# Patient Record
Sex: MALE | Race: WHITE | NOT HISPANIC OR LATINO | Employment: OTHER | URBAN - METROPOLITAN AREA
[De-identification: names, ages, dates, MRNs, and addresses within clinical notes are randomized per-mention and may not be internally consistent; named-entity substitution may affect disease eponyms.]

---

## 2017-01-03 ENCOUNTER — ALLSCRIPTS OFFICE VISIT (OUTPATIENT)
Dept: OTHER | Facility: OTHER | Age: 72
End: 2017-01-03

## 2017-01-06 ENCOUNTER — ALLSCRIPTS OFFICE VISIT (OUTPATIENT)
Dept: OTHER | Facility: OTHER | Age: 72
End: 2017-01-06

## 2017-01-23 ENCOUNTER — HOSPITAL ENCOUNTER (OUTPATIENT)
Dept: RADIOLOGY | Facility: CLINIC | Age: 72
Discharge: HOME | End: 2017-01-23
Payer: COMMERCIAL

## 2017-01-23 ENCOUNTER — ALLSCRIPTS OFFICE VISIT (OUTPATIENT)
Dept: OTHER | Facility: OTHER | Age: 72
End: 2017-01-23

## 2017-01-23 DIAGNOSIS — J45.909 UNCOMPLICATED ASTHMA: ICD-10-CM

## 2017-01-23 DIAGNOSIS — I10 ESSENTIAL (PRIMARY) HYPERTENSION: ICD-10-CM

## 2017-01-23 DIAGNOSIS — J18.9 PNEUMONIA: ICD-10-CM

## 2017-01-23 PROCEDURE — 71020 HB CHEST X-RAY 2VW FRONTAL&LATL: CPT

## 2017-01-24 ENCOUNTER — GENERIC CONVERSION - ENCOUNTER (OUTPATIENT)
Dept: OTHER | Facility: OTHER | Age: 72
End: 2017-01-24

## 2017-02-28 ENCOUNTER — ALLSCRIPTS OFFICE VISIT (OUTPATIENT)
Dept: OTHER | Facility: OTHER | Age: 72
End: 2017-02-28

## 2017-04-06 ENCOUNTER — ALLSCRIPTS OFFICE VISIT (OUTPATIENT)
Dept: OTHER | Facility: OTHER | Age: 72
End: 2017-04-06

## 2017-04-15 ENCOUNTER — ALLSCRIPTS OFFICE VISIT (OUTPATIENT)
Dept: OTHER | Facility: OTHER | Age: 72
End: 2017-04-15

## 2017-09-01 ENCOUNTER — ALLSCRIPTS OFFICE VISIT (OUTPATIENT)
Dept: OTHER | Facility: OTHER | Age: 72
End: 2017-09-01

## 2017-09-01 LAB
BILIRUB UR QL STRIP: NORMAL
CLARITY UR: NORMAL
COLOR UR: YELLOW
GLUCOSE (HISTORICAL): NORMAL
HGB UR QL STRIP.AUTO: NORMAL
KETONES UR STRIP-MCNC: NORMAL MG/DL
LEUKOCYTE ESTERASE UR QL STRIP: NORMAL
NITRITE UR QL STRIP: NORMAL
OCCULT BLD, FECAL IMMUNOLOGICAL (HISTORICAL): NEGATIVE
PH UR STRIP.AUTO: 6 [PH]
PROT UR STRIP-MCNC: NORMAL MG/DL
SP GR UR STRIP.AUTO: 1.01
UROBILINOGEN UR QL STRIP.AUTO: NORMAL

## 2017-09-02 ENCOUNTER — LAB CONVERSION - ENCOUNTER (OUTPATIENT)
Dept: OTHER | Facility: OTHER | Age: 72
End: 2017-09-02

## 2017-09-02 LAB
ERYTHROCYTE SEDIMENTATION RATE (HISTORICAL): 2 MM/H
TSH SERPL DL<=0.05 MIU/L-ACNC: 1.15 MIU/L (ref 0.4–4.5)

## 2017-09-03 ENCOUNTER — GENERIC CONVERSION - ENCOUNTER (OUTPATIENT)
Dept: OTHER | Facility: OTHER | Age: 72
End: 2017-09-03

## 2017-09-05 ENCOUNTER — LAB CONVERSION - ENCOUNTER (OUTPATIENT)
Dept: OTHER | Facility: OTHER | Age: 72
End: 2017-09-05

## 2017-09-05 LAB
A/G RATIO (HISTORICAL): 2.1 (CALC) (ref 1–2.5)
ALBUMIN SERPL BCP-MCNC: 4.4 G/DL (ref 3.6–5.1)
ALP SERPL-CCNC: 83 U/L (ref 40–115)
ALT SERPL W P-5'-P-CCNC: 13 U/L (ref 9–46)
AST SERPL W P-5'-P-CCNC: 18 U/L (ref 10–35)
BILIRUB SERPL-MCNC: 0.6 MG/DL (ref 0.2–1.2)
BUN SERPL-MCNC: 11 MG/DL (ref 7–25)
BUN/CREA RATIO (HISTORICAL): ABNORMAL (CALC) (ref 6–22)
C-REACTIVE PROTEIN (HISTORICAL): <0.1 MG/DL
CALCIUM SERPL-MCNC: 9.7 MG/DL (ref 8.6–10.3)
CHLORIDE SERPL-SCNC: 99 MMOL/L (ref 98–110)
CHOLEST SERPL-MCNC: 188 MG/DL
CHOLEST/HDLC SERPL: 3.6 (CALC)
CO2 SERPL-SCNC: 32 MMOL/L (ref 20–31)
CREAT SERPL-MCNC: 0.87 MG/DL (ref 0.7–1.18)
EGFR AFRICAN AMERICAN (HISTORICAL): 101 ML/MIN/1.73M2
EGFR-AMERICAN CALC (HISTORICAL): 87 ML/MIN/1.73M2
ERYTHROCYTE SEDIMENTATION RATE (HISTORICAL): 2 MM/H
GAMMA GLOBULIN (HISTORICAL): 2.1 G/DL (CALC) (ref 1.9–3.7)
GLUCOSE (HISTORICAL): 85 MG/DL (ref 65–99)
HDLC SERPL-MCNC: 52 MG/DL
LDL CHOLESTEROL (HISTORICAL): 114 MG/DL (CALC)
NON-HDL-CHOL (CHOL-HDL) (HISTORICAL): 136 MG/DL (CALC)
POTASSIUM SERPL-SCNC: 5.1 MMOL/L (ref 3.5–5.3)
PROSTATE SPECIFIC ANTIGEN TOTAL (HISTORICAL): 0.8 NG/ML
SODIUM SERPL-SCNC: 138 MMOL/L (ref 135–146)
TOTAL PROTEIN (HISTORICAL): 6.5 G/DL (ref 6.1–8.1)
TRIGL SERPL-MCNC: 110 MG/DL
TSH SERPL DL<=0.05 MIU/L-ACNC: 1.15 MIU/L (ref 0.4–4.5)
URIC ACID (HISTORICAL): 7.1 MG/DL (ref 4–8)

## 2017-09-06 ENCOUNTER — GENERIC CONVERSION - ENCOUNTER (OUTPATIENT)
Dept: OTHER | Facility: OTHER | Age: 72
End: 2017-09-06

## 2018-01-11 NOTE — RESULT NOTES
Message   PLEASE CALL   REPEAT POTASSIUM WAS 4 7  - IN THE NORMAL RANGE   PSA WAS 1 4 WHICH WAS NORMAL AND LOW   THANKS     Verified Results  (1) BASIC METABOLIC PROFILE 30BSJ5383 09:54AM Nilesstefani Padron    Order Number: [id number]     Test Name Result Flag Reference   GLUCOSE,RANDM 90 mg/dL     If the patient is fasting, the ADA then defines impaired fasting glucose as > 100 mg/dL and diabetes as > or equal to 123 mg/dL  SODIUM 143 mmol/L  136-145   POTASSIUM 4 7 mmol/L  3 5-5 3   CHLORIDE 105 mmol/L  100-108   CARBON DIOXIDE 34 mmol/L H 21-32   ANION GAP (CALC) 4 mmol/L  4-13   BLOOD UREA NITROGEN 7 mg/dL  5-25   CREATININE 0 89 mg/dL  0 60-1 30   Standardized to IDMS reference method   CALCIUM 8 8 mg/dL  8 3-10 1   eGFR Non-African American      >60 0 ml/min/1 73sq m   [facility] Disease Education Program recommendations are as follows:  GFR calculation is accurate only with a steady state creatinine  Chronic Kidney disease less than 60 ml/min/1 73 sq  meters  Kidney failure less than 15 ml/min/1 73 sq  meters

## 2018-01-11 NOTE — PROGRESS NOTES
Assessment   1  Hypertension (401 9) (I10)  2  Mild intermittent asthma without complication (169 78) (T00 08)  3  Hyperlipidemia (272 4) (E78 5)  4  Hyperplasia of prostate (600 90) (N40 0)  5  GERD (gastroesophageal reflux disease) (530 81) (K21 9)  6  Generalized osteoarthritis of multiple sites (715 09) (M15 9)  7  History of chronic rhinitis (V12 69) (Z87 09)  8  Gouty arthropathy (274 00) (M10 9)    Plan   Generalized osteoarthritis of multiple sites, GERD (gastroesophageal reflux disease),  Gouty arthropathy, Hyperlipidemia, Hyperplasia of prostate, Hypertension, Mild  intermittent asthma without complication    · (1) CBC/ PLT (NO DIFF); Status: In Progress - Specimen/Data Collected;   Done:  39GHP8906   · (1) SED RATE; Status: In Progress - Specimen/Data Collected;   Done: 24MNL8047   · (1) TSH; Status: In Progress - Specimen/Data Collected;   Done: 09TYU1033   · EKG/ECG- POC; Status:Complete;   Done: 19OFP9718   · Hemoccult Screening - POC; Status:Complete;   Done: 71RFU9180 12:00AM   · Routine Venipuncture - POC; Status:Complete;   Done: 56YLF8433  Health Maintenance    · Always use a seat belt and shoulder strap when riding or driving a motor vehicle ;  Status:Complete;   Done: 49PFY0554   · Begin a limited exercise program ; Status:Complete;   Done: 95STU5648   · Drink plenty of fluids ; Status:Complete;   Done: 82ENH1451   · Eat a low fat and low cholesterol diet ; Status:Complete;   Done: 84WSM3992   · Eat a normal well-balanced diet ; Status:Complete;   Done: 67PLD6451   · Use a sun block product with an SPF of 15 or more ; Status:Complete;   Done:  16ZMY2891   · We encourage all of our patients to exercise regularly    30 minutes of exercise or physical  activity five or more days a week is recommended for children and adults ;  Status:Complete;   Done: 38EZZ5440   · We recommend routine visits to a dentist ; Status:Complete;   Done: 41VZH2627  Hypertension    · A diet low in sodium and high in potassium, magnesium, and calcium can help your  blood pressure ; Status:Complete;   Done: 59LOC8013   · Continue with our present treatment plan ; Status:Complete;   Done: 88CFX1994   · Restrict the salt in your diet by avoiding highly salted foods ; Status:Complete;   Done:  88NKR0245   · Call [phone] if: You become dizzy or lightheaded, especially when you stand up  after sitting for a while ; Status:Complete;   Done: 67AXS5565    Urine Dip Automated- POC; Status:Resulted - Requires Verification,Retrospective Authorization;   Done: 17DXU8908 12:00AM  Due:01Sep2018; Last Updated By:Johanne Peralta Aw; 9/3/2017 3:50:30 PM;Ordered;    For:Health Maintenance; Ordered By:Jonn Cook;   (1) COMPREHENSIVE METABOLIC PANEL; Status:Resulted - Requires Verification;   Done: 85JPP5569 12:00AM  Due:01Sep2018; Ordered; For:Generalized osteoarthritis of multiple sites, GERD (gastroesophageal reflux disease), Gouty arthropathy, Hyperlipidemia, Hyperplasia of prostate, Hypertension, Mild intermittent asthma without complication; Ordered By:Jonn Cook;   (1) C-REACTIVE PROTEIN; Status:Resulted - Requires Verification;   Done: 86TWG4571 12:00AM  Due:59Pnn5310; Ordered; For:Generalized osteoarthritis of multiple sites, GERD (gastroesophageal reflux disease), Gouty arthropathy, Hyperlipidemia, Hyperplasia of prostate, Hypertension, Mild intermittent asthma without complication; Ordered By:Jonn Cook;   (1) LIPID PANEL, FASTING; Status:Resulted - Requires Verification;   Done: 74RVE6916 12:00AM  Due:57Xbt3566; Ordered; For:Generalized osteoarthritis of multiple sites, GERD (gastroesophageal reflux disease), Gouty arthropathy, Hyperlipidemia, Hyperplasia of prostate, Hypertension, Mild intermittent asthma without complication; Ordered By:Jonn Cook;   (1) URIC ACID; Status:Resulted - Requires Verification;   Done: 03VOL9208 12:00AM  Due:23Nnz8712; Ordered;     For:Generalized osteoarthritis of multiple sites, GERD (gastroesophageal reflux disease), Gouty arthropathy, Hyperlipidemia, Hyperplasia of prostate, Hypertension, Mild intermittent asthma without complication; Ordered By:Ilir Cook;   (1) PSA (SCREEN) (Dx V76 44 Screen for Prostate Cancer); Status:Resulted - Requires Verification;   Done: 53MMO7019 12:00AM  Due:01Sep2018; Ordered;    For:Encounter for prostate cancer screening, Generalized osteoarthritis of multiple sites, GERD (gastroesophageal reflux disease), Gouty arthropathy, Hyperlipidemia, Hyperplasia of prostate, Hypertension, Mild intermittent asthma without complication; Ordered By:Ilir Cook; Follow-up visit in 1 year Evaluation and Treatment  Follow-up  Status: Hold For - Scheduling  Requested for: 01Sep2017  Ordered; For: Health Maintenance;  Ordered By: Catalina Tobin  Performed:   Due: 01SRB5872  Follow-up visit in 6 months Evaluation and Treatment  Follow-up  Status: Hold For - Scheduling  Requested for: 01Sep2017  Ordered; For: Hypertension;  Ordered By: Catalina Fails  Performed:   Due: 64HAR3288  Fluzone High-Dose 0 5 ML Intramuscular Suspension Prefilled Syringe; INJECT 0 5  ML Intramuscular; Dose: 0 5 ml; Route: Intramuscular; Site: Left Deltoid; Done: 41XZW6105 11:31AM; Status: Complete - Retrospective By Protocol Authorization;  Ordered  For: Need for influenza vaccination; Ordered By:Ilir Cook; Effective Date:01Sep2017; Administered by: Adonay Kauffman: 9/1/2017 11:31:00 AM; Last Updated By: Adonay Kauffman; 9/3/2017 3:50:35 PM     Discussion/Summary    DISCUSSED HEALTH MAINTENANCE ISSUES  BW WILL BE OBTAINED  BP CHECK IN 6 M  RV 1 YR FOR CPX  Impression: Initial Annual Wellness Visit, with preventive exam as well as age and risk appropriate counseling completed   Immunizations: influenza vaccine is up to date this year, the lifetime pneumococcal vaccine has been completed, hepatitis B vaccination series is not indicated at this time due to the patient's low risk of donald the disease, Zostavax vaccination status is unknown and Tdap vaccination up to date  Advance Directive Planning: not complete  Patient Discussion: follow-up visit needed in 6 MONTHS  The treatment plan was reviewed with the patient/guardian  The patient/guardian understands and agrees with the treatment plan      Chief Complaint  AWV (dli)      History of Present Illness  HPI: PATIENT RETURNS FOR ANNUAL REVIEW OF MEDICAL ISSUES  REVIEWED MEDICAL RECORD  NO MAJOR CONCERNS   Welcome to Medicare and Wellness Visits: The patient is being seen for the initial annual wellness visit  Medicare Screening and Risk Factors   Hospitalizations: he has been previously hospitalizied  Medicare Screening Tests Risk Questions   Abdominal aortic aneurysm risk assessment: none indicated  Osteoporosis risk assessment: none indicated  HIV risk assessment: none indicated  Drug and Alcohol Use: The patient is a former cigarette smoker  The patient reports rare alcohol use  Alcohol concern:   The patient has no concerns about alcohol abuse  He has never used illicit drugs  Diet and Physical Activity: Current diet includes well balanced meals  He exercises infrequently  Mood Disorder and Cognitive Impairment Screening: PHQ-9 Depression Scale   Depression screening  mild to moderate symptoms  He denies feeling down, depressed, or hopeless over the past two weeks  He denies feeling little interest or pleasure in doing things over the past two weeks  Cognitive impairment screening: denies difficulty learning/retaining new information, denies difficulty handling complex tasks, denies difficulty with reasoning, denies difficulty with spatial ability and orientation, denies difficulty with language and denies difficulty with behavior  Advance Directives: Advance directives: no living will, no durable power of  for health care directives and no advance directives     Co-Managers and Medical Equipment/Suppliers: See Patient Care Team   Preventive Quality Program 65 and Older: The patient currently has no urinary incontinence symptoms  Review of Systems    Constitutional: no fever, no chills and not feeling tired  ENT: no sore throat and no nasal discharge  Cardiovascular: no chest pain, the heart rate was not fast, no palpitations and no lower extremity edema  Respiratory: no shortness of breath, no wheezing and no shortness of breath during exertion  Gastrointestinal: no abdominal pain, no nausea, no vomiting and no diarrhea  Genitourinary: no dysuria  Musculoskeletal: arthralgias and joint stiffness, but no joint swelling  Integumentary: no rashes  Neurological: no headache, no numbness, no tingling and no dizziness  ROS reviewed  Active Problems   1  Adjustment disorder with mixed anxiety and depressed mood (309 28) (F43 23)  2  Disturbance of sleep (780 50) (G47 9)  3  Generalized osteoarthritis of multiple sites (715 09) (M15 9)  4  GERD (gastroesophageal reflux disease) (530 81) (K21 9)  5  Gouty arthropathy (274 00) (M10 9)  6  Hyperlipidemia (272 4) (E78 5)  7  Hyperplasia of prostate (600 90) (N40 0)  8  Hypertension (401 9) (I10)  9  Mild intermittent asthma without complication (322 37) (X65 16)  10  Sensorineural hearing loss (389 10) (H90 5)    Past Medical History    · History of Colon cancer screening (V76 51) (Z12 11)   · History of Glaucoma (365 9) (H40 9)   · History of cataract (V12 49) (Z86 69)   · History of Sciatica of right side (724 3) (M54 31)    The active problems and past medical history were reviewed and updated today  Surgical History    · History of Tonsillectomy    The surgical history was reviewed and updated today         Family History  Mother    · Family history of CAD (coronary artery disease)   · Family history of Cancer  Father    · Family history of CAD (coronary artery disease)   · Family history of   Family History · Denied: FH: mental illness    The family history was reviewed and updated today  Social History    · Denied: History of Active advance directive   · Cultural background   · NON-   · Daily caffeine consumption, 1 serving a day   · Dental care, regularly   · Drinks wine (V49 89) (Z78 9)   · Former smoker (V15 82) (Z64 147)   · [facility]   · No advance directives (V49 89) (Z78 9)   · No alcohol use   · Primary spoken language English  The social history was reviewed and updated today  The social history was reviewed and updated today  Current Meds  1  Asmanex 30 Metered Doses 110 MCG/INH Inhalation Aerosol Powder Breath Activated;   USE 1 INHALATION DAILY (DISCARD 45 DAYS AFTER OPENING); Therapy: 30KCF8823 to (Last YR:10KOY4130)  Requested for: 24Jan2017 Ordered  2  Clarinex-D 12 Hour 2 5-120 MG Oral Tablet Extended Release 12 Hour; TAKE 1 TABLET   EVERY 12 HOURS AS NEEDED; Therapy: 82Rrw3810 to (Evaluate:23Mar2017)  Requested for: 53Gaa2831; Last   Rx:22Feb2017 Ordered  3  Finasteride 5 MG Oral Tablet; TAKE 1 TABLET DAILY; Therapy: 37RSA9727 to (HIRFIAWQ:52FRT4472); Last Rx:30Jan2017 Ordered  4  Myrbetriq 50 MG Oral Tablet Extended Release 24 Hour; TAKE 1 TABLET Bedtime; Last   Rx:30Jan2017 Ordered  5  Nabumetone 500 MG Oral Tablet; TAKE 1 TABLET TWICE DAILY; Last Rx:24Jan2017   Ordered  6  Tamsulosin HCl - 0 4 MG Oral Capsule; Take 1 tablet by mouth at bedtime; Last   Rx:30Jan2017 Ordered  7  Zolpidem Tartrate 10 MG Oral Tablet; take 1 tablet by mouth at bedtime if needed; Therapy: 64PIK0746 to (Evaluate:05Url8630)  Requested for: 38JEV6831; Last   Rx:21Jun2017 Ordered    The medication list was reviewed and updated today  Allergies   1  No Known Drug Allergies    Immunizations   ** Printed in Appendix #1 below       Vitals  Signs    Temperature: 97 5 F  Heart Rate: 58  Respiration: 16  Systolic: 335  Diastolic: 78  Height: 6 ft 1 in  Weight: 194 lb   BMI Calculated: 25 6  BSA Calculated: 2 12    Physical Exam    Constitutional   General appearance: No acute distress, well appearing and well nourished  Eyes   Conjunctiva and lids: No swelling, erythema, or discharge  Pupils and irises: Equal, round and reactive to light  Ears, Nose, Mouth, and Throat   External inspection of ears and nose: Normal     Otoscopic examination: Tympanic membrance translucent with normal light reflex  Canals patent without erythema  Nasal mucosa, septum, and turbinates: Normal without edema or erythema  Oropharynx: Normal with no erythema, edema, exudate or lesions  Pulmonary   Respiratory effort: No increased work of breathing or signs of respiratory distress  Auscultation of lungs: Clear to auscultation, equal breath sounds bilaterally, no wheezes, no rales, no rhonci  Cardiovascular   Auscultation of heart: Normal rate and rhythm, normal S1 and S2, without murmurs  Examination of extremities for edema and/or varicosities: Normal     Carotid pulses: Normal     Abdomen   Abdomen: Non-tender, no masses  Liver and spleen: No hepatomegaly or splenomegaly  Lymphatic   Palpation of lymph nodes in neck: No lymphadenopathy  Musculoskeletal   Gait and station: Normal     Digits and nails: Normal without clubbing or cyanosis  Inspection/palpation of joints, bones, and muscles: Abnormal   MILD DJD CHANGES  Skin   Skin and subcutaneous tissue: Normal without rashes or lesions  Neurologic   Cranial nerves: Cranial nerves 2-12 intact  Reflexes: 2+ and symmetric  Sensation: No sensory loss      Psychiatric   Orientation to person, place and time: Normal     Mood and affect: Normal          Results/Data  *VB - Urinary Incontinence Screen (Dx Z13 89 Screen for UI) 54Kqx7473 10:40AM      Test Name Result Flag Reference   Urinary Incontinence Assessment 04Ynr4829       Falls Risk Assessment (Dx Z13 89 Screen for Neurologic Disorder) 15BCK8080 10:39AM User, Ahs     Test Name Result Flag Reference   Falls Risk      No falls in the past year     Hemoccult Screening - POC 48Aym5028 12:00AM David Ulloa     Test Name Result Flag Reference   Hemoccult Negative         Signatures   Electronically signed by : Giuliana Handy MD; Oct  1 2017  4:16PM EST                       (Author)    Appendix #1     Patient: Rosemarie Hanna; : 1945; MRN: [id number]      1 2 3 4 5    Influenza  13-Oct-2010 12-Sep-2013 01-Oct-2014 25-Aug-2016 [id number]    [facility]  25-Aug-2016        Td/DT  01-Edward-1999 20-May-2010

## 2018-01-12 VITALS
HEART RATE: 68 BPM | RESPIRATION RATE: 16 BRPM | DIASTOLIC BLOOD PRESSURE: 66 MMHG | SYSTOLIC BLOOD PRESSURE: 130 MMHG | WEIGHT: 201 LBS | TEMPERATURE: 96.5 F

## 2018-01-12 VITALS
WEIGHT: 196 LBS | RESPIRATION RATE: 16 BRPM | DIASTOLIC BLOOD PRESSURE: 80 MMHG | BODY MASS INDEX: 26.55 KG/M2 | HEIGHT: 72 IN | TEMPERATURE: 96.7 F | SYSTOLIC BLOOD PRESSURE: 142 MMHG | HEART RATE: 80 BPM

## 2018-01-13 VITALS
TEMPERATURE: 97.5 F | DIASTOLIC BLOOD PRESSURE: 78 MMHG | WEIGHT: 194 LBS | HEART RATE: 58 BPM | SYSTOLIC BLOOD PRESSURE: 130 MMHG | HEIGHT: 73 IN | BODY MASS INDEX: 25.71 KG/M2 | RESPIRATION RATE: 16 BRPM

## 2018-01-13 VITALS
HEART RATE: 64 BPM | DIASTOLIC BLOOD PRESSURE: 70 MMHG | BODY MASS INDEX: 25.67 KG/M2 | RESPIRATION RATE: 20 BRPM | SYSTOLIC BLOOD PRESSURE: 110 MMHG | HEIGHT: 74 IN | WEIGHT: 200 LBS | OXYGEN SATURATION: 98 % | TEMPERATURE: 97.7 F

## 2018-01-13 VITALS
DIASTOLIC BLOOD PRESSURE: 74 MMHG | HEIGHT: 72 IN | RESPIRATION RATE: 16 BRPM | HEART RATE: 68 BPM | SYSTOLIC BLOOD PRESSURE: 130 MMHG | TEMPERATURE: 97.3 F | BODY MASS INDEX: 27.63 KG/M2 | WEIGHT: 204 LBS

## 2018-01-13 NOTE — RESULT NOTES
Message   please call   cxr was great   no signs of pneumonia     Verified Results  * XR CHEST PA & LATERAL 58WFT4287 03:15PM Gerhardt Rolling     Test Name Result Flag Reference   XR CHEST PA & LATERAL (Report)     CHEST      INDICATION: Follow-up pneumonia  COMPARISON: None     VIEWS: Frontal and lateral projections; 2 images     FINDINGS:        Cardiomediastinal silhouette appears unremarkable  The lungs are clear  No pneumothorax or pleural effusion  Visualized osseous structures appear within normal limits for the patient's age  IMPRESSION:     No active pulmonary disease  Workstation performed: [id number]     Signed by:   Roxanne Herrmann MD   1/23/17       Plan  Arthritis    · Nabumetone 500 MG Oral Tablet; TAKE 1 TABLET TWICE DAILY  Asthma    · Asmanex 30 Metered Doses 110 MCG/INH Inhalation Aerosol Powder Breath  Activated; USE 1 INHALATION DAILY (DISCARD 45 DAYS AFTER OPENING)  Disturbance of sleep    · Zolpidem Tartrate 10 MG Oral Tablet; take 1 tablet by mouth at bedtime if  needed  PMH: History of allergic rhinitis    · Desloratadine 5 MG Oral Tablet;  Take 1 tablet daily   · Mometasone Furoate 50 MCG/ACT Nasal Suspension (Nasonex); USE 1 SPRAY  IN EACH NOSTRIL DAILY

## 2018-01-13 NOTE — RESULT NOTES
Discussion/Summary   JOHANNE,   REVIEWED BW RESULTS   ALL LOOKED GOOD! KEEP UP THE GOOD WORK      DR CANTU     Verified Results  (1) COMPREHENSIVE METABOLIC PANEL 44PWT6331 93:53OO [employer]     Test Name Result Flag Reference   GLUCOSE 85 mg/dL  65-99   Fasting reference interval   UREA NITROGEN (BUN) 11 mg/dL  7-25   CREATININE 0 87 mg/dL  0 70-1 18   For patients >52years of age, the reference limit  for Creatinine is approximately 13% higher for people  identified as -American  eGFR NON-AFR  AMERICAN 87 mL/min/1 73m2  > OR = 60   eGFR AFRICAN AMERICAN 101 mL/min/1 73m2  > OR = 60   BUN/CREATININE RATIO   3-50   NOT APPLICABLE (calc)   SODIUM 138 mmol/L  135-146   POTASSIUM 5 1 mmol/L  3 5-5 3   CHLORIDE 99 mmol/L     CARBON DIOXIDE 32 mmol/L H 20-31   CALCIUM 9 7 mg/dL  8 6-10 3   PROTEIN, TOTAL 6 5 g/dL  6 1-8 1   ALBUMIN 4 4 g/dL  3 6-5 1   GLOBULIN 2 1 g/dL (calc)  1 9-3 7   ALBUMIN/GLOBULIN RATIO 2 1 (calc)  1 0-2 5   BILIRUBIN, TOTAL 0 6 mg/dL  0 2-1 2   ALKALINE PHOSPHATASE 83 U/L     AST 18 U/L  10-35   ALT 13 U/L  9-46     (1) C-REACTIVE PROTEIN 01Sep2017 12:46PM [employer]     Test Name Result Flag Reference   C-REACTIVE PROTEIN <0 10 mg/dL  <0 80   Please be advised that patients taking Carboxypenicillins  may exhibit falsely decreased C-Reactive Protein levels  due to an analytical interference in this assay  (1) LIPID PANEL, FASTING 01Sep2017 12:46PM [employer]     Test Name Result Flag Reference   CHOLESTEROL, TOTAL 188 mg/dL  <200   HDL CHOLESTEROL 52 mg/dL  >58   TRIGLICERIDES 959 mg/dL  <150   LDL-CHOLESTEROL 114 mg/dL (calc) H    Reference range: <100     Desirable range <100 mg/dL for patients with CHD or  diabetes and <70 mg/dL for diabetic patients with  known heart disease       The [facility] calculation is a validated novel   method that provides better accuracy than the   Friedewald equation in the estimation of LDL-C,   particularly when TG levels are 150-400 mg/dL and   LDL-C levels are lower than 70 mg/dL  Reference:  Melissa Parikh et al  Comparison of a Novel   Method vs the Friedewald Equation for Estimating   Low-Density Lipoprotein Cholesterol Levels From the   Standard Lipid Profile  LOS  5829;108(08): 9410-9560  For additional information, please refer to  [email or web address]/faq/ITS117  (This link is being provided for informational/  educational purposes only )   CHOL/HDLC RATIO 3 6 (calc)  <5 0   NON HDL CHOLESTEROL 136 mg/dL (calc) H <130   For patients with diabetes plus 1 major ASCVD risk   factor, treating to a non-HDL-C goal of <100 mg/dL   (LDL-C of <70 mg/dL) is considered a therapeutic   option      (1) URIC ACID 61Szw1179 12:46PM [employer]     Test Name Result Flag Reference   URIC ACID 7 1 mg/dL  4 0-8 0   Therapeutic target for gout patients: <6 0 mg/dL     (1) PSA (SCREEN) (Dx V76 44 Screen for Prostate Cancer) 99Mmu2470 12:46PM [employer]     Test Name Result Flag Reference   PSA, TOTAL 0 8 ng/mL  < OR = 4 0   The total PSA value from this assay system is   standardized against the WHO standard  The test   result will be approximately 20% lower when compared   to the equimolar-standardized total PSA (Prakash   Julio)  Comparison of serial PSA results should be   interpreted with this fact in mind  This test was performed using the Siemens   chemiluminescent method  Values obtained from   different assay methods cannot be used  interchangeably  PSA levels, regardless of  value, should not be interpreted as absolute  evidence of the presence or absence of disease       (Q) SED RATE BY MODIFIED Lane Hoskins 58KNE7321 12:46PM [employer]     Test Name Result Flag Reference   SED RATE BY MODIFIEDHAY 2 mm/h  < OR = 20     (Q) CBC (H/H, RBC, INDICES, WBC, PLT) 96BVV6398 12:46PM [employer]     Test Name Result Flag Reference   WHITE BLOOD CELL COUNT 5 1 Thousand/uL  3 8-10 8   RED BLOOD CELL COUNT 4 78 Million/uL  4 20-5 80   HEMOGLOBIN 14 2 g/dL  13 2-17 1   HEMATOCRIT 43 6 %  38 5-50 0   MCV 91 2 fL  80 0-100 0   MCH 29 7 pg  27 0-33 0   MCHC 32 6 g/dL  32 0-36 0   RDW 11 9 %  11 0-15 0   PLATELET COUNT 049 Thousand/uL  140-400   WE RECEIVED YOUR HANDWRITTEN TEST ORDER AND  PERFORMED A HEMOGRAM WITH A PLATELET WITHOUT  A DIFFERENTIAL  IF THIS IS NOT WHAT YOU INTENDED  TO ORDER, PLEASE CONTACT YOUR LOCAL CLIENT SERVICE  REPRESENTATIVE IMMEDIATELY SO THAT WE CAN   ADJUST OUR BILLING APPROPRIATELY  YOU MAY ALSO   INQUIRE ABOUT ALTERNATIVE OR ADDITIONAL TESTING     MPV 10 2 fL  7 5-12 5           (Q) TSH, 3RD GENERATION 29Mqf0883 12:46PM Sreedhar Rocha     Test Name Result Flag Reference   TSH 1 15 mIU/L  0 40-4 50

## 2018-01-14 NOTE — PROGRESS NOTES
Assessment    1  Encounter for preventive health examination (V70 0) (Z00 00)    Plan  Disturbance of sleep, Hyperlipidemia    · (1) TSH; Status:Active; Requested for:02May2016;   Encounter for prostate cancer screening    · (1) PSA (SCREEN) (Dx V76 44 Screen for Prostate Cancer); Status:Active; Requested  for:02May2016;   Encounter for prostate cancer screening, Hyperlipidemia, Hypertension    · (1) COMPREHENSIVE METABOLIC PANEL; Status:Active; Requested for:02May2016;   GERD (gastroesophageal reflux disease), Gout, Hyperlipidemia, Hypertension    · (1) CBC/PLT/DIFF; Status:Active; Requested for:02May2016; Health Maintenance    · Follow-up visit in 1 year Evaluation and Treatment  Follow-up  Status: Hold For -  Scheduling  Requested for: 54PLW5200   · Always use a seat belt and shoulder strap when riding or driving a motor vehicle ;  Status:Complete;   Done: 00ETE9107 11:00AM   · Begin a limited exercise program ; Status:Complete;   Done: 34DBL0686 11:00AM   · We recommend that you follow the "Mediterranean diet "; Status:Complete;   Done:  87VXR2769 11:00AM   · Physical Therapy Referral Other Physician Referral  Consult  Status: Active  Requested  for: 86IRE5548  are Referring to a non- Preferred Provider : Established Patient  Care Summary provided  : Yes  Hyperlipidemia, Hypertension    · (1) LIPID PANEL, FASTING; Status:Active; Requested for:02May2016;     Discussion/Summary  Impression: Subsequent Annual Wellness Visit  Cardiovascular screening and counseling: screening is current, counseling was given on maintaining a healthy diet, counseling was given on maintaining a healthy weight and due for a lipid panel  Diabetes screening and counseling: due for blood glucose  Colorectal cancer screening and counseling: screening is current  Prostate cancer screening and counseling: screening is current  Advance Directive Planning: not complete        Chief Complaint  Patient is here today for an Annual Wellness Exam  lb/pn      History of Present Illness  Welcome to Medicare and Wellness Visits: The patient is being seen for the subsequent annual wellness visit  Medicare Screening and Risk Factors   Hospitalizations: he has been hospitalized 0 times and None  Medicare Screening Tests Risk Questions   Abdominal aortic aneurysm risk assessment: over 72years of age and Quit 39 years ago  Osteoporosis risk assessment:  and over 48years of age  HIV risk assessment: none indicated  Drug and Alcohol Use: The patient is a former cigarette smoker and quit smoking 36 years ago  He has smoked for year(s)  The patient reports never drinking alcohol  Alcohol concern:   The patient has no concerns about alcohol abuse  He has never used illicit drugs  Diet and Physical Activity: Current diet includes well balanced meals, low salt food choices, 2 servings of fruit per day, 1 servings of vegetables per day, 1 servings of meat per day, 1 servings of whole grains per day, 2 cups of coffee per day and 6 glasses of water per day  He exercises infrequently and exercises 5 times per week  Exercise: walking 25 minutes per day  Mood Disorder and Cognitive Impairment Screening: PHQ-9 Depression Scale Anxiety screening score was 0  He denies feeling down, depressed, or hopeless over the past two weeks  He denies feeling little interest or pleasure in doing things over the past two weeks  Cognitive impairment screening: denies difficulty learning/retaining new information, denies difficulty handling complex tasks, denies difficulty with reasoning, denies difficulty with spatial ability and orientation, denies difficulty with language and denies difficulty with behavior  Functional Ability/Level of Safety: Hearing is slightly decreased in the right ear, slightly decreased in the left ear and a hearing aid is not used  He reports hearing difficulties   Activities of daily living details: does not need help using the phone, no transportation help needed, does not need help shopping, no meal preparation help needed, does not need help doing housework, does not need help doing laundry, does not need help managing medications and does not need help managing money  Fall risk factors:  Incomplete emptying of bladder and urinary frequency  , but The patient fell None times in the past 12 months  Home safety risk factors:  no unfamiliar surroundings, no loose rugs, no poor household lighting, no uneven floors, no household clutter, grab bars in the bathroom and handrails on the stairs  Advance Directives: Advance directives: no living will, no durable power of  for health care directives and no advance directives  Co-Managers and Medical Equipment/Suppliers: See Patient Care Team      Review of Systems    Constitutional: negative  Eyes: negative  ENT: negative  Cardiovascular: negative  Respiratory: negative  Gastrointestinal: negative  Genitourinary: negative  Musculoskeletal: negative  Integumentary and Breasts: negative  Neurological: Sciatic pain, but negative  Psychiatric: negative  Endocrine: negative  Hematologic and Lymphatic: negative  Active Problems    1  Acute bronchitis (466 0) (J20 9)   2  Adjustment disorder with mixed anxiety and depressed mood (309 28) (F43 23)   3  Allergic rhinitis (477 9) (J30 9)   4  Arthralgia (719 40) (M25 50)   5  Asthma (493 90) (J45 909)   6  Cataract (366 9) (H26 9)   7  Chronic rhinitis (472 0) (J31 0)   8  Disturbance of sleep (780 50) (G47 9)   9  Encounter for prostate cancer screening (V76 44) (Z12 5)   10  GERD (gastroesophageal reflux disease) (530 81) (K21 9)   11  Gout (274 9) (M10 9)   12  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)   13  Hyperlipidemia (272 4) (E78 5)   14  Hyperplasia of prostate (600 90) (N40 0)   15  Hypertension (401 9) (I10)   16  Osteoarthritis, multiple sites (715 89) (M15 9)   17  Otalgia (388 70) (H92 09)   18  Overweight (278 02) (E66 3)   19  Rash and other nonspecific skin eruption (782 1) (R21)   20  Sensorineural hearing loss (389 10) (H90 5)    Past Medical History    · History of Acute otitis media (382 9) (H66 90)   · History of Cellulitis and abscess of digit (681 9)   · History of Colon cancer screening (V76 51) (Z12 11)   · History of Eustachian tube dysfunction (381 81) (H69 80)   · History of Glaucoma (365 9) (H40 9)   · History of Pneumonia (486) (J18 9)   · History of Sinusitis (473 9) (J32 9)    The active problems and past medical history were reviewed and updated today  Surgical History    · History of Tonsillectomy    The surgical history was reviewed and updated today  Family History  Mother    · Family history of CAD (coronary artery disease)   · Family history of Cancer  Father    · Family history of CAD (coronary artery disease)   · Family history of     The family history was reviewed and updated today  Social History    · Cultural background   · NON-   · Daily caffeine consumption, 1 serving a day   · Drinks wine (V49 89) (Z78 9)   · Former smoker (Z61 32) (Y46 968)   · QUIT IN    · No alcohol use   · Primary spoken language English  The social history was reviewed and updated today  Current Meds   1  Asmanex 120 Metered Doses 220 MCG/INH Inhalation Aerosol Powder Breath Activated;   INHALE 1 PUFF TWICE DAILY; Therapy: (Recorded:2015) to Recorded   2  Asmanex 30 Metered Doses 110 MCG/INH Inhalation Aerosol Powder Breath Activated;   USE 1 INHALATION DAILY (DISCARD 45 DAYS AFTER OPENING); Therapy: 87WXW9435 to (Last Rx:2015)  Requested for: 2015 Ordered   3  Desloratadine 5 MG Oral Tablet; Take 1 tablet daily; Therapy: 19NLG0718 to (Last Rx:2015)  Requested for: 2015 Ordered   4  Ecotrin Low Strength 81 MG Oral Tablet Delayed Release; TAKE 1 TABLET DAILY; Therapy: (Recorded:2015) to Recorded   5   Flomax 0 4 MG Oral Capsule; Take 1 tablet by mouth at bedtime; Therapy: (VJDNLIUH:22JAK8619) to Recorded   6  Myrbetriq 50 MG Oral Tablet Extended Release 24 Hour; TAKE 1 TABLET Bedtime; Therapy: (Rossana Magluis) to Recorded   7  Nabumetone 500 MG Oral Tablet; TAKE 1 TABLET TWICE DAILY; Therapy: (Recorded:20Jan2015) to Recorded   8  Nasonex 50 MCG/ACT Nasal Suspension; USE 1 SPRAY IN EACH NOSTRIL DAILY; Therapy: 66HDQ5630 to (Last Rx:95Qfe9705)  Requested for: 38SYA4191 Ordered   9  Niacin ER (Antihyperlipidemic) 1000 MG Oral Tablet Extended Release; Take 1 tablet   daily; Therapy: 12XFG3446 to (Last Rx:34Vpw6632)  Requested for: 11WJW4081 Ordered   10  Niaspan 1000 MG Oral Tablet Extended Release; TAKE 1 TABLET DAILY AT BEDTIME; Therapy: (HFWPQQPD:47GJV5417) to Recorded   11  Vitamin D3 1000 UNIT Oral Tablet; TAKE 1 TABLET DAILY; Therapy: (Recorded:16Mar2015) to Recorded   12  Zolpidem Tartrate 10 MG Oral Tablet; take 1 tablet by mouth at bedtime if needed; Therapy: 92HAR4394 to (Evaluate:27Jun2016)  Requested for: 49UWW3889; Last    Rx:29Mar2016 Ordered    The medication list was reviewed and updated today  Allergies    1  No Known Drug Allergies    Immunizations   1 2 3 4    Influenza  56Hjz6772 17Dxi4893 [id number]    Td/DT  02MMT2416 97JTE5273       Physical Exam    Constitutional   General appearance: No acute distress, well appearing and well nourished  Eyes   Conjunctiva and lids: No erythema, swelling or discharge  Pupils and irises: Equal, round, reactive to light  Ophthalmoscopic examination: Normal fundi and optic discs  Ears, Nose, Mouth, and Throat   External inspection of ears and nose: Normal     Otoscopic examination: Tympanic membranes translucent with normal light reflex  Canals patent without erythema  Hearing: Normal     Nasal mucosa, septum, and turbinates: Normal without edema or erythema  Lips, teeth, and gums: Normal, good dentition      Oropharynx: Normal with no erythema, edema, exudate or lesions  Neck   Neck: Supple, symmetric, trachea midline, no masses  Thyroid: Normal, no thyromegaly  Pulmonary   Respiratory effort: No increased work of breathing or signs of respiratory distress  Auscultation of lungs: Clear to auscultation  Cardiovascular   Auscultation of heart: Normal rate and rhythm, normal S1 and S2, no murmurs  Carotid pulses: 2+ bilaterally  Abdominal aorta: Normal     Examination of extremities for edema and/or varicosities: Normal     Abdomen   Abdomen: Non-tender, no masses  Liver and spleen: No hepatomegaly or splenomegaly  Examination for hernias: No hernias appreciated  Lymphatic   Palpation of lymph nodes in neck: No lymphadenopathy  Musculoskeletal   Gait and station: Normal     Inspection/palpation of digits and nails: Normal without clubbing or cyanosis  Inspection/palpation of joints, bones, and muscles: Normal     Range of motion: Normal     Stability: Normal     Muscle strength/tone: Normal     Skin   Skin and subcutaneous tissue: Normal without rashes or lesions  Palpation of skin and subcutaneous tissue: Normal turgor  Neurologic   Cranial nerves: Cranial nerves 2-12 intact  Reflexes: 2+ and symmetric  Sensation: No sensory loss  Psychiatric   Judgment and insight: Normal     Orientation to person, place and time: Normal     Recent and remote memory: Intact      Mood and affect: Normal        Results/Data  PHQ-2 Adult Depression Screening 34VYR5508 10:32AM User, Ahs     Test Name Result Flag Reference   PHQ-2 Adult Depression Score 0     Q1: 0, Q2: 0   PHQ-2 Adult Depression Screening Negative         Signatures   Electronically signed by : Caro Cardenas MD; May  2 2016 11:27AM EST                       (Author)

## 2018-01-15 VITALS
DIASTOLIC BLOOD PRESSURE: 70 MMHG | TEMPERATURE: 97.8 F | BODY MASS INDEX: 25.67 KG/M2 | SYSTOLIC BLOOD PRESSURE: 120 MMHG | OXYGEN SATURATION: 99 % | RESPIRATION RATE: 20 BRPM | HEART RATE: 64 BPM | HEIGHT: 74 IN | WEIGHT: 200 LBS

## 2018-01-16 NOTE — RESULT NOTES
Verified Results  (1) CBC/ PLT (NO DIFF) 41Mkk8455 01:00PM Rafat Cotton     Test Name Result Flag Reference   HEMATOCRIT 44 4 %  42 0-52 0   HEMOGLOBIN 15 1 g/dL  14 0-18 0   MCHC 33 9 g/dL  31 4-37 4   MCH 31 7 pg H 27 0-31 0   MCV 93 fL  82-98   PLATELET COUNT 521 Thousands/uL  130-400   RBC COUNT 4 76 Million/uL  4 70-6 10   RDW 13 6 %  11 6-15 1   WBC COUNT 6 60 Thousand/uL  4 80-10 80   MPV 8 8 fL L 8 9-12 7     (1) COMPREHENSIVE METABOLIC PANEL 14QTD6304 04:44GC Rafat Cotton     Test Name Result Flag Reference   GLUCOSE,RANDM 103 mg/dL     If the patient is fasting, the ADA then defines impaired fasting glucose as > 100 mg/dL and diabetes as > or equal to 123 mg/dL  SODIUM 143 mmol/L  136-145   POTASSIUM 5 9 mmol/L H 3 5-5 3   CHLORIDE 103 mmol/L  100-108   CARBON DIOXIDE 33 mmol/L H 21-32   ANION GAP (CALC) 7 mmol/L  4-13   BLOOD UREA NITROGEN 6 mg/dL  5-25   CREATININE 1 03 mg/dL  0 60-1 30   Standardized to IDMS reference method   CALCIUM 9 6 mg/dL  8 3-10 1   BILI, TOTAL 0 60 mg/dL  0 20-1 00   ALK PHOSPHATAS 82 U/L     ALT (SGPT) 24 U/L  12-78   AST(SGOT) 18 U/L  5-45   ALBUMIN 4 6 g/dL  3 5-5 0   TOTAL PROTEIN 7 2 g/dL  6 4-8 2   eGFR Non-African American      >60 0 ml/min/1 73[facility] Disease Education Program recommendations are as follows:  GFR calculation is accurate only with a steady state creatinine  Chronic Kidney disease less than 60 ml/min/1 73 sq  meters  Kidney failure less than 15 ml/min/1 73 sq  meters  (1) LIPID PANEL, FASTING 38Spt8803 01:00PM Rafat Cotton     Test Name Result Flag Reference   CHOLESTEROL 209 mg/dL H    HDL,DIRECT 53 mg/dL  40-60   Specimen collection should occur prior to Metamizole administration due to the potential for falsely depressed results     LDL CHOLESTEROL CALCULATED 137 mg/dL H 0-100   Triglyceride:         Normal              <150 mg/dl       Borderline High    150-199 mg/dl       High               200-499 mg/dl       Very High          >499 mg/dl  Cholesterol:         Desirable        <200 mg/dl      Borderline High  200-239 mg/dl      High             >239 mg/dl  HDL Cholesterol:        High    >59 mg/dL      Low     <41 mg/dL  LDL CALCULATED:    This screening LDL is a calculated result  It does not have the accuracy of the Direct Measured LDL in the monitoring of patients with hyperlipidemia and/or statin therapy  Direct Measure LDL (VWV562) must be ordered separately in these patients  TRIGLYCERIDES 94 mg/dL  <=150   Specimen collection should occur prior to N-Acetylcysteine or Metamizole administration due to the potential for falsely depressed results  (1) TSH 86Kfk8205 01:00PM [facility]     Test Name Result Flag Reference   TSH 1 195 uIU/mL  0 358-3 740   Patients undergoing fluorescein dye angiography may retain small amounts of fluorescein in the body for 48-72 hours post procedure  Samples containing fluorescein can produce falsely depressed TSH values  If the patient had this procedure,a specimen should be resubmitted post fluorescein clearance         Discussion/Summary   JOHANNE   REVIEWED BW   BLOOD COUNT, LIVER FUNCTION, KIDNEY FUNCTION AND THYROID CHECK WERE ALL OK   CHOL WAS SL ELEVATED - BUT OK - VERY GOOD "GOOD" CHOL      POTASSIUM WAS A BIT ELEVATED - PROBABLY BECAUSE OF THE BLOOD DRAW   LETS REPEAT THE POTASSIUM IN A WEEK OR TWO   THANKS

## 2018-03-23 DIAGNOSIS — J30.1 ACUTE SEASONAL ALLERGIC RHINITIS DUE TO POLLEN: ICD-10-CM

## 2018-03-23 DIAGNOSIS — N40.0 PROSTATIC HYPERTROPHY: ICD-10-CM

## 2018-03-23 DIAGNOSIS — G47.9 SLEEP DISTURBANCE: Primary | ICD-10-CM

## 2018-03-25 RX ORDER — FINASTERIDE 5 MG/1
5 TABLET, FILM COATED ORAL DAILY
Qty: 30 TABLET | Refills: 3 | Status: SHIPPED | OUTPATIENT
Start: 2018-03-25 | End: 2019-11-05 | Stop reason: SDUPTHER

## 2018-03-25 RX ORDER — ZOLPIDEM TARTRATE 10 MG/1
10 TABLET ORAL
Qty: 30 TABLET | Refills: 3 | Status: SHIPPED | OUTPATIENT
Start: 2018-03-25 | End: 2018-07-17 | Stop reason: SDUPTHER

## 2018-03-25 RX ORDER — DESLORATADINE 5 MG/1
5 TABLET ORAL DAILY
Qty: 30 TABLET | Refills: 3 | Status: SHIPPED | OUTPATIENT
Start: 2018-03-25 | End: 2020-09-10 | Stop reason: ALTCHOICE

## 2018-04-23 DIAGNOSIS — N40.0 PROSTATIC HYPERTROPHY: Primary | ICD-10-CM

## 2018-04-23 RX ORDER — TAMSULOSIN HYDROCHLORIDE 0.4 MG/1
0.4 CAPSULE ORAL
Qty: 90 CAPSULE | Refills: 3 | Status: SHIPPED | OUTPATIENT
Start: 2018-04-23 | End: 2018-06-22 | Stop reason: SDUPTHER

## 2018-05-15 RX ORDER — NABUMETONE 500 MG/1
1 TABLET, FILM COATED ORAL 2 TIMES DAILY
COMMUNITY
Start: 2017-12-18 | End: 2018-07-12 | Stop reason: SDUPTHER

## 2018-05-15 RX ORDER — FINASTERIDE 5 MG/1
1 TABLET, FILM COATED ORAL DAILY
COMMUNITY
Start: 2017-01-30 | End: 2018-05-16 | Stop reason: SDUPTHER

## 2018-05-15 RX ORDER — ZOLPIDEM TARTRATE 10 MG/1
1 TABLET ORAL
COMMUNITY
Start: 2014-12-03 | End: 2018-05-16 | Stop reason: SDUPTHER

## 2018-05-15 RX ORDER — TAMSULOSIN HYDROCHLORIDE 0.4 MG/1
1 CAPSULE ORAL
COMMUNITY
End: 2018-05-16 | Stop reason: SDUPTHER

## 2018-05-16 ENCOUNTER — OFFICE VISIT (OUTPATIENT)
Dept: FAMILY MEDICINE CLINIC | Facility: CLINIC | Age: 73
End: 2018-05-16
Payer: COMMERCIAL

## 2018-05-16 VITALS
DIASTOLIC BLOOD PRESSURE: 88 MMHG | SYSTOLIC BLOOD PRESSURE: 138 MMHG | OXYGEN SATURATION: 97 % | WEIGHT: 196 LBS | TEMPERATURE: 97.5 F | RESPIRATION RATE: 16 BRPM | BODY MASS INDEX: 25.86 KG/M2 | HEART RATE: 60 BPM

## 2018-05-16 DIAGNOSIS — J45.20 MILD INTERMITTENT ASTHMA WITHOUT COMPLICATION: Primary | ICD-10-CM

## 2018-05-16 DIAGNOSIS — I10 ESSENTIAL HYPERTENSION: ICD-10-CM

## 2018-05-16 DIAGNOSIS — E78.01 FAMILIAL HYPERCHOLESTEROLEMIA: ICD-10-CM

## 2018-05-16 DIAGNOSIS — N40.0 HYPERPLASIA OF PROSTATE: ICD-10-CM

## 2018-05-16 PROCEDURE — 99214 OFFICE O/P EST MOD 30 MIN: CPT | Performed by: FAMILY MEDICINE

## 2018-05-17 NOTE — ASSESSMENT & PLAN NOTE
WATCHING CHOLESTEROL INTAKE  COMPLIANT WITH MEDICATION  NO CONCERNS    - CONTINUE TO MONITOR DIETARY CHOL INTAKE  - CONTINUE CURRENT MEDICATION  - ENCOURAGED PHYSICAL ACTIVITY

## 2018-05-17 NOTE — PROGRESS NOTES
Assessment/Plan:    Problem List Items Addressed This Visit     Hyperlipidemia     WATCHING CHOLESTEROL INTAKE  COMPLIANT WITH MEDICATION  NO CONCERNS    - CONTINUE TO MONITOR DIETARY CHOL INTAKE  - CONTINUE CURRENT MEDICATION  - ENCOURAGED PHYSICAL ACTIVITY             Hyperplasia of prostate    Hypertension     STABLE  DENIES ANY CP, SOB, PALPITATIONS, OR HEADACHE  NOTES NO WATER RETENTION  COMPLIANT WITH MEDICATION  NO CONCERNS    - CONTINUE CURRENT TREATMENT PLAN  - MONITOR DIETARY SODIUM INTAKE  - ENCOURAGE PHYSICAL ACTIVITY  - RV 6 MONTHS         Mild intermittent asthma without complication - Primary     STABLE  BREATHING WELL  COMPLIANT WITH MEDICATIONS         Relevant Medications    Mometasone Furoate (ASMANEX 30 METERED DOSES) 110 MCG/INH AEPB          Patient Instructions   CONTINUE CURRENT TREATMENT PLAN  MEDICATIONS WILL BE RENEWED  RV 6M      Return in about 6 months (around 11/16/2018) for Recheck  Subjective:      Patient ID: Odell Suarez is a 71 y o  male  Chief Complaint   Patient presents with   [address] [place] [address]       PATIENT RETURNS FOR ROUTINE EVALUATION OF PATIENT'S MEDICAL ISSUES    INDIVIDUAL MEDICAL ISSUES WITH THEIR CURRENT STATUS, ASSESSMENT AND PLANS ARE LISTED ABOVE          The following portions of the patient's history were reviewed and updated as appropriate: allergies, current medications, past family history, past medical history, past social history, past surgical history and problem list     Review of Systems   Constitutional: Negative for chills, fatigue and fever  HENT: Negative for congestion, ear discharge, ear pain, mouth sores, postnasal drip, sore throat and trouble swallowing  Eyes: Negative for pain, discharge and visual disturbance  Respiratory: Negative for cough, shortness of breath and wheezing  Cardiovascular: Negative for chest pain, palpitations and leg swelling     Gastrointestinal: Negative for abdominal distention, abdominal pain, blood in stool, diarrhea and nausea  Endocrine: Negative for polydipsia, polyphagia and polyuria  Genitourinary: Negative for dysuria, frequency, hematuria and urgency  Musculoskeletal: Negative for arthralgias, gait problem and joint swelling  Skin: Negative for pallor and rash  Neurological: Negative for dizziness, syncope, speech difficulty, weakness, light-headedness, numbness and headaches  Hematological: Negative for adenopathy  Psychiatric/Behavioral: Negative for behavioral problems, confusion and sleep disturbance  The patient is not nervous/anxious  Current Outpatient Prescriptions   Medication Sig Dispense Refill    desloratadine (CLARINEX) 5 MG tablet Take 1 tablet (5 mg total) by mouth daily 30 tablet 3    finasteride (PROSCAR) 5 mg tablet Take 1 tablet (5 mg total) by mouth daily 30 tablet 3    Mirabegron ER (MYRBETRIQ) 50 MG TB24 Take 1 tablet by mouth      Mometasone Furoate (ASMANEX 30 METERED DOSES) 110 MCG/INH AEPB Inhale      nabumetone (RELAFEN) 500 mg tablet Take 1 tablet by mouth 2 (two) times a day      tamsulosin (FLOMAX) 0 4 mg Take 1 capsule (0 4 mg total) by mouth daily with dinner 90 capsule 3    zolpidem (AMBIEN) 10 mg tablet Take 1 tablet (10 mg total) by mouth daily at bedtime as needed for sleep 30 tablet 3     No current facility-administered medications for this visit  Objective:    /88   Pulse 60   Temp 97 5 °F (36 4 °C) (Temporal)   Resp 16   Wt 88 9 kg (196 lb)   SpO2 97%   BMI 25 86 kg/m²        Physical Exam   Constitutional: He is oriented to person, place, and time  He appears well-developed and well-nourished  HENT:   Head: Normocephalic and atraumatic  Eyes: Conjunctivae and EOM are normal  Pupils are equal, round, and reactive to light  Right eye exhibits no discharge  Left eye exhibits no discharge  Neck: Neck supple  No JVD present  No thyromegaly present     Cardiovascular: Normal rate, regular rhythm and normal heart sounds  No murmur heard  Pulmonary/Chest: Effort normal and breath sounds normal  He has no wheezes  He has no rales  Abdominal: Soft  Bowel sounds are normal  He exhibits no mass  There is no hepatosplenomegaly  There is no tenderness  There is no rebound, no guarding and no CVA tenderness  Musculoskeletal: Normal range of motion  He exhibits no edema, tenderness or deformity  Lymphadenopathy:     He has no cervical adenopathy  He has no axillary adenopathy  Neurological: He is alert and oriented to person, place, and time  Skin: Skin is warm and dry  No rash noted  No erythema  Psychiatric: He has a normal mood and affect   His behavior is normal  Judgment and thought content normal               Marco Daniels MD

## 2018-05-22 ENCOUNTER — TELEPHONE (OUTPATIENT)
Dept: FAMILY MEDICINE CLINIC | Facility: CLINIC | Age: 73
End: 2018-05-22

## 2018-05-22 DIAGNOSIS — N40.0 PROSTATIC HYPERTROPHY: Primary | ICD-10-CM

## 2018-05-22 NOTE — TELEPHONE ENCOUNTER
Saw you Thursday of last week and discussed getting a rx called in for Myrbetriq,50mg but went to the pharm and nothing was there   Please call into [facility]  States no med allergies  (states he has had a few bad nights being up all night using the bathroom)

## 2018-06-22 DIAGNOSIS — N40.0 PROSTATIC HYPERTROPHY: ICD-10-CM

## 2018-06-22 RX ORDER — TAMSULOSIN HYDROCHLORIDE 0.4 MG/1
0.4 CAPSULE ORAL 2 TIMES DAILY
Qty: 180 CAPSULE | Refills: 0 | Status: SHIPPED | OUTPATIENT
Start: 2018-06-22 | End: 2018-07-18 | Stop reason: SDUPTHER

## 2018-07-12 DIAGNOSIS — M15.9 PRIMARY OSTEOARTHRITIS INVOLVING MULTIPLE JOINTS: Primary | ICD-10-CM

## 2018-07-12 RX ORDER — NABUMETONE 500 MG/1
500 TABLET, FILM COATED ORAL 2 TIMES DAILY
Qty: 180 TABLET | Refills: 3 | Status: SHIPPED | OUTPATIENT
Start: 2018-07-12 | End: 2018-09-07 | Stop reason: DRUGHIGH

## 2018-07-17 DIAGNOSIS — G47.9 SLEEP DISTURBANCE: ICD-10-CM

## 2018-07-17 RX ORDER — ZOLPIDEM TARTRATE 10 MG/1
TABLET ORAL
Qty: 30 TABLET | Refills: 3 | Status: SHIPPED | OUTPATIENT
Start: 2018-07-17 | End: 2018-07-26 | Stop reason: SDUPTHER

## 2018-07-17 RX ORDER — ZOLPIDEM TARTRATE 10 MG/1
10 TABLET ORAL
Qty: 30 TABLET | Refills: 0 | Status: SHIPPED | OUTPATIENT
Start: 2018-07-17 | End: 2018-07-26 | Stop reason: SDUPTHER

## 2018-07-18 DIAGNOSIS — N40.0 PROSTATIC HYPERTROPHY: ICD-10-CM

## 2018-07-18 RX ORDER — TAMSULOSIN HYDROCHLORIDE 0.4 MG/1
CAPSULE ORAL
Qty: 180 CAPSULE | Refills: 0 | Status: SHIPPED | OUTPATIENT
Start: 2018-07-18 | End: 2018-08-29 | Stop reason: SDUPTHER

## 2018-07-26 DIAGNOSIS — G47.9 SLEEP DISTURBANCE: ICD-10-CM

## 2018-07-26 RX ORDER — ZOLPIDEM TARTRATE 10 MG/1
10 TABLET ORAL
Qty: 30 TABLET | Refills: 0 | Status: SHIPPED | OUTPATIENT
Start: 2018-07-26 | End: 2018-11-17 | Stop reason: SDUPTHER

## 2018-08-29 DIAGNOSIS — N40.0 PROSTATIC HYPERTROPHY: ICD-10-CM

## 2018-08-29 RX ORDER — TAMSULOSIN HYDROCHLORIDE 0.4 MG/1
0.4 CAPSULE ORAL 2 TIMES DAILY
Qty: 180 CAPSULE | Refills: 0 | Status: SHIPPED | OUTPATIENT
Start: 2018-08-29 | End: 2019-06-21 | Stop reason: SDUPTHER

## 2018-09-07 ENCOUNTER — OFFICE VISIT (OUTPATIENT)
Dept: FAMILY MEDICINE CLINIC | Facility: CLINIC | Age: 73
End: 2018-09-07
Payer: COMMERCIAL

## 2018-09-07 VITALS
SYSTOLIC BLOOD PRESSURE: 126 MMHG | OXYGEN SATURATION: 97 % | HEIGHT: 73 IN | WEIGHT: 189.4 LBS | RESPIRATION RATE: 16 BRPM | HEART RATE: 78 BPM | BODY MASS INDEX: 25.1 KG/M2 | TEMPERATURE: 97 F | DIASTOLIC BLOOD PRESSURE: 70 MMHG

## 2018-09-07 DIAGNOSIS — K21.9 GASTROESOPHAGEAL REFLUX DISEASE WITHOUT ESOPHAGITIS: ICD-10-CM

## 2018-09-07 DIAGNOSIS — E78.01 FAMILIAL HYPERCHOLESTEROLEMIA: ICD-10-CM

## 2018-09-07 DIAGNOSIS — Z13.29 SCREENING FOR HYPOTHYROIDISM: ICD-10-CM

## 2018-09-07 DIAGNOSIS — N40.0 HYPERPLASIA OF PROSTATE: ICD-10-CM

## 2018-09-07 DIAGNOSIS — M15.9 GENERALIZED OSTEOARTHRITIS OF MULTIPLE SITES: ICD-10-CM

## 2018-09-07 DIAGNOSIS — Z23 NEED FOR INFLUENZA VACCINATION: ICD-10-CM

## 2018-09-07 DIAGNOSIS — I10 ESSENTIAL HYPERTENSION: Primary | ICD-10-CM

## 2018-09-07 DIAGNOSIS — M10.9 GOUTY ARTHROPATHY: ICD-10-CM

## 2018-09-07 DIAGNOSIS — J45.20 MILD INTERMITTENT ASTHMA WITHOUT COMPLICATION: ICD-10-CM

## 2018-09-07 DIAGNOSIS — Z12.11 COLON CANCER SCREENING: ICD-10-CM

## 2018-09-07 DIAGNOSIS — Z12.5 ENCOUNTER FOR PROSTATE CANCER SCREENING: ICD-10-CM

## 2018-09-07 LAB
SL AMB  POCT GLUCOSE, UA: 0
SL AMB LEUKOCYTE ESTERASE,UA: 0
SL AMB POCT BILIRUBIN,UA: 1
SL AMB POCT BLOOD,UA: 0
SL AMB POCT CLARITY,UA: CLEAR
SL AMB POCT COLOR,UA: YELLOW
SL AMB POCT FECES OCC BLD: NEGATIVE
SL AMB POCT KETONES,UA: 0
SL AMB POCT NITRITE,UA: 0
SL AMB POCT PH,UA: 6
SL AMB POCT SPECIFIC GRAVITY,UA: 1.01
SL AMB POCT URINE PROTEIN: 0
SL AMB POCT UROBILINOGEN: 0

## 2018-09-07 PROCEDURE — 3008F BODY MASS INDEX DOCD: CPT | Performed by: FAMILY MEDICINE

## 2018-09-07 PROCEDURE — 99215 OFFICE O/P EST HI 40 MIN: CPT | Performed by: FAMILY MEDICINE

## 2018-09-07 PROCEDURE — 36415 COLL VENOUS BLD VENIPUNCTURE: CPT | Performed by: FAMILY MEDICINE

## 2018-09-07 PROCEDURE — 90662 IIV NO PRSV INCREASED AG IM: CPT

## 2018-09-07 PROCEDURE — 3074F SYST BP LT 130 MM HG: CPT | Performed by: FAMILY MEDICINE

## 2018-09-07 PROCEDURE — 81003 URINALYSIS AUTO W/O SCOPE: CPT | Performed by: FAMILY MEDICINE

## 2018-09-07 PROCEDURE — 93000 ELECTROCARDIOGRAM COMPLETE: CPT | Performed by: FAMILY MEDICINE

## 2018-09-07 PROCEDURE — G0008 ADMIN INFLUENZA VIRUS VAC: HCPCS

## 2018-09-07 PROCEDURE — 3078F DIAST BP <80 MM HG: CPT | Performed by: FAMILY MEDICINE

## 2018-09-07 PROCEDURE — 1170F FXNL STATUS ASSESSED: CPT | Performed by: FAMILY MEDICINE

## 2018-09-07 PROCEDURE — 82270 OCCULT BLOOD FECES: CPT | Performed by: FAMILY MEDICINE

## 2018-09-07 PROCEDURE — 1125F AMNT PAIN NOTED PAIN PRSNT: CPT | Performed by: FAMILY MEDICINE

## 2018-09-07 RX ORDER — NABUMETONE 750 MG/1
TABLET, FILM COATED ORAL
Refills: 0 | COMMUNITY
Start: 2018-07-18 | End: 2019-10-14

## 2018-09-07 NOTE — ASSESSMENT & PLAN NOTE
STABLE  DENIES ANY CP, INDIGESTION, OR COUGH  NOTES NO MELENA OR HEMATOCHEZIA  NO HEMATEMESIS  COMPLIANT WITH MEDICATION  NO CONCERNS    - CONTINUE CURRENT TREATMENT PLAN  - MEDICATION AS PRESCRIBED  - AVOID CAFFEINE, ALCOHOL OR SMOKING

## 2018-09-07 NOTE — PROGRESS NOTES
Assessment and Plan:    Problem List Items Addressed This Visit     Generalized osteoarthritis of multiple sites     STABLE  DENIES ANY JOINT SWELLING OR REDNESS  JOINT STIFFNESS PRESENT  PAIN MANAGEMENT ADEQUATE    - CONTINUE CURRENT MANAGEMENT  - MEDICATION AS DIRECTED  - CALL / RETURN IF SYMPTOMS WORSEN           GERD (gastroesophageal reflux disease)     STABLE  DENIES ANY CP, INDIGESTION, OR COUGH  NOTES NO MELENA OR HEMATOCHEZIA  NO HEMATEMESIS  COMPLIANT WITH MEDICATION  NO CONCERNS    - CONTINUE CURRENT TREATMENT PLAN  - MEDICATION AS PRESCRIBED  - AVOID CAFFEINE, ALCOHOL OR SMOKING           Relevant Orders    CBC and differential    Gouty arthropathy    Hyperlipidemia     WATCHING CHOLESTEROL INTAKE  COMPLIANT WITH MEDICATION  NO CONCERNS    - CONTINUE TO MONITOR DIETARY CHOL INTAKE  - CONTINUE CURRENT MEDICATION  - ENCOURAGED PHYSICAL ACTIVITY             Relevant Orders    Lipid panel    TSH, 3rd generation with Free T4 reflex    Hyperplasia of prostate     STABLE  TOLERATING MEDICATION WELL         Hypertension - Primary     STABLE  DENIES ANY CP, SOB, PALPITATIONS, OR HEADACHE  NOTES NO WATER RETENTION  COMPLIANT WITH MEDICATION  NO CONCERNS    - CONTINUE CURRENT TREATMENT PLAN  - MONITOR DIETARY SODIUM INTAKE  - ENCOURAGE PHYSICAL ACTIVITY  - RV 6 MONTHS           Relevant Orders    POCT ECG    POCT urine dip auto non-scope    Comprehensive metabolic panel    Uric acid    Mild intermittent asthma without complication     STABLE  TOLERATING MEDICATION WELL         Colon cancer screening    Relevant Orders    POCT hemoccult screening    Encounter for prostate cancer screening    Relevant Orders    PSA, Total Screen    Screening for hypothyroidism    Relevant Orders    Lipid panel    TSH, 3rd generation with Free T4 reflex        Health Maintenance Due   Topic Date Due    Depression Screening PHQ  1945    CRC Screening: Colonoscopy  1945    DTaP,Tdap,and Td Vaccines (1 - Tdap) 05/21/2010  Fall Risk  10/11/2011    Pneumococcal PPSV23/PCV13 65+ Years / Low and Medium Risk (2 of 2 - PPSV23) 08/25/2017    INFLUENZA VACCINE  09/01/2018         HPI:  Scottie Odom is a 71 y o  male here for his Subsequent Wellness Visit      Patient Active Problem List   Diagnosis    Mixed emotional features as adjustment reaction    Disturbance of sleep    Generalized osteoarthritis of multiple sites    GERD (gastroesophageal reflux disease)    Gouty arthropathy    Hyperlipidemia    Hyperplasia of prostate    Hypertension    Mild intermittent asthma without complication    Sensorineural hearing loss    Colon cancer screening    Encounter for prostate cancer screening    Screening for hypothyroidism     Past Medical History:   Diagnosis Date    Cataract     resolved: 7/20/2016    Glaucoma     Sciatica, right side     last assessed: 7/20/2016     Past Surgical History:   Procedure Laterality Date    TONSILLECTOMY       Family History   Problem Relation Age of Onset    Coronary artery disease Mother     Cancer Mother     Coronary artery disease Father     Substance Abuse Daughter     Mental illness Neg Hx      History   Smoking Status    Former Smoker    Quit date: 1979   Smokeless Tobacco    Never Used     History   Alcohol Use    Yes     Comment: drinks wine; no alcohol use (both enteries as per allscripts)      History   Drug Use No       Current Outpatient Prescriptions   Medication Sig Dispense Refill    desloratadine (CLARINEX) 5 MG tablet Take 1 tablet (5 mg total) by mouth daily 30 tablet 3    finasteride (PROSCAR) 5 mg tablet Take 1 tablet (5 mg total) by mouth daily 30 tablet 3    Glucosamine HCl (GLUCOSAMINE PO) Take by mouth daily      Mometasone Furoate (ASMANEX 30 METERED DOSES) 110 MCG/INH AEPB Inhale      nabumetone (RELAFEN) 750 mg tablet   0    tamsulosin (FLOMAX) 0 4 mg Take 1 capsule (0 4 mg total) by mouth 2 (two) times a day 180 capsule 0    zolpidem (AMBIEN) 10 mg tablet Take 1 tablet (10 mg total) by mouth daily at bedtime as needed for sleep 30 tablet 0    Mirabegron ER (MYRBETRIQ) 50 MG TB24 Take 1 tablet by mouth      Mirabegron ER (MYRBETRIQ) 50 MG TB24 Take 1 tablet (50 mg total) by mouth daily (Patient not taking: Reported on 9/7/2018 ) 30 tablet 3     No current facility-administered medications for this visit  No Known Allergies  Immunization History   Administered Date(s) Administered    DT (pediatric) 06/01/1999    Influenza Split High Dose Preservative Free IM 10/01/2014, 08/25/2016, 09/01/2017    Influenza TIV (IM) 1945, 10/13/2010, 09/12/2013    Pneumococcal Conjugate 13-Valent 08/25/2016    Td (adult), adsorbed 05/20/2010       Patient Care Team:  Tonja Moore MD as PCP - General (Family Medicine)    Medicare Screening Tests and Risk Assessments:  Fatemeh Jha is here for his Subsequent Wellness visit  Last Medicare Wellness visit information reviewed, patient interviewed and updates made to the record today  Health Risk Assessment:  Patient rates overall health as good  Patient feels that their physical health rating is Same  Eyesight was rated as Same  Hearing was rated as Same  Patient feels that their emotional and mental health rating is Same  Pain experienced by patient in the last 7 days has been Some  Emotional/Mental Health:  Patient has been feeling nervous/anxious  PHQ-9 Depression Screening:    Frequency of the following problems over the past two weeks:      1  Little interest or pleasure in doing things: 0 - not at all      2  Feeling down, depressed, or hopeless: 1 - several days  PHQ-2 Score: 1          Broken Bones/Falls: Fall Risk Assessment:    In the past year, patient has experienced: No history of falling in past year          Bladder/Bowel:  Patient has not leaked urine accidently in the last six months  Patient reports no loss of bowel control      Immunizations:  Patient has not had a flu vaccination within the last year  Patient has not received a pneumonia shot  Home Safety:  Patient does not have trouble with stairs inside or outside of their home  Patient currently reports that there are no safety hazards present in home, working smoke alarms, no working carbon monoxide detectors  Preventative Screenings:   prostate cancer screen performed, no colon cancer screen completed, no cholesterol screen completed, no glaucoma eye exam completed    Nutrition:  Current diet: Regular and No Added Salt with servings of the following:    Medications:  Patient is currently taking over-the-counter supplements  Patient is able to manage medications  Lifestyle Choices:  Patient reports no tobacco use  Patient has smoked or used tobacco in the past   Patient has stopped his tobacco use  Tobacco use quit date: 40 yrs ago   Patient reports no alcohol use  Patient drives a vehicle  Patient wears seat belt  Current level of exercise of physical activity described by patient as: moderate daily  Activities of Daily Living:  Can get out of bed by his or her self, able to dress self, able to make own meals, able to do own shopping, able to bathe self, can do own laundry/housekeeping, can manage own money, pay bills and track expenses    Previous Hospitalizations:  No hospitalization or ED visit in past 12 months        Advanced Directives:  Patient has not decided on power of   Patient has not completed advanced directive      Social Support: Savanna Goodwin, [phone]    Preventative Screening/Counseling:      Cardiovascular:      General: Risks and Benefits Discussed and Screening Current      Counseling: Healthy Weight and Healthy Diet     Due for Labs/Analytes/Optional EKG: Lipid Panel and Cholesterol          Diabetes:      General: Risks and Benefits Discussed and Screening Current      Counseling: Healthy Weight, Healthy Diet and Improve Physical Activity      Due for labs: Blood Glucose          Colorectal Cancer:      General: Risks and Benefits Discussed and Screening Current          Prostate Cancer:      General: Risks and Benefits Discussed and Screening Current      Due for labs: PSA          Osteoporosis:      General: Screening Not Indicated          AAA:      General: Screening Not Indicated          Glaucoma:      General: Risks and Benefits Discussed          HIV:      General: Screening Not Indicated          Hepatitis C:      General: Screening Not Indicated        Advanced Directives:   Patient has no living will for healthcare, does not have durable POA for healthcare, patient does not have an advanced directive  Information on ACP and/or AD not provided  No 5 wishes given  No end of life assessment reviewed with patient

## 2018-09-07 NOTE — ASSESSMENT & PLAN NOTE
STABLE  DENIES ANY JOINT SWELLING OR REDNESS  JOINT STIFFNESS PRESENT  PAIN MANAGEMENT ADEQUATE    - CONTINUE CURRENT MANAGEMENT  - MEDICATION AS DIRECTED  - CALL / RETURN IF SYMPTOMS WORSEN

## 2018-09-08 LAB
ALBUMIN SERPL-MCNC: 4.3 G/DL (ref 3.6–5.1)
ALBUMIN/GLOB SERPL: 2 (CALC) (ref 1–2.5)
ALP SERPL-CCNC: 77 U/L (ref 40–115)
ALT SERPL-CCNC: 12 U/L (ref 9–46)
AST SERPL-CCNC: 14 U/L (ref 10–35)
BASOPHILS # BLD AUTO: 32 CELLS/UL (ref 0–200)
BASOPHILS NFR BLD AUTO: 0.6 %
BILIRUB SERPL-MCNC: 0.6 MG/DL (ref 0.2–1.2)
BUN SERPL-MCNC: 9 MG/DL (ref 7–25)
BUN/CREAT SERPL: NORMAL (CALC) (ref 6–22)
CALCIUM SERPL-MCNC: 9.5 MG/DL (ref 8.6–10.3)
CHLORIDE SERPL-SCNC: 98 MMOL/L (ref 98–110)
CHOLEST SERPL-MCNC: 175 MG/DL
CHOLEST/HDLC SERPL: 3.9 (CALC)
CO2 SERPL-SCNC: 28 MMOL/L (ref 20–32)
CREAT SERPL-MCNC: 1.01 MG/DL (ref 0.7–1.18)
EOSINOPHIL # BLD AUTO: 80 CELLS/UL (ref 15–500)
EOSINOPHIL NFR BLD AUTO: 1.5 %
ERYTHROCYTE [DISTWIDTH] IN BLOOD BY AUTOMATED COUNT: 12.1 % (ref 11–15)
GLOBULIN SER CALC-MCNC: 2.1 G/DL (CALC) (ref 1.9–3.7)
GLUCOSE SERPL-MCNC: 89 MG/DL (ref 65–99)
HCT VFR BLD AUTO: 43.3 % (ref 38.5–50)
HDLC SERPL-MCNC: 45 MG/DL
HGB BLD-MCNC: 14.5 G/DL (ref 13.2–17.1)
LDLC SERPL CALC-MCNC: 110 MG/DL (CALC)
LYMPHOCYTES # BLD AUTO: 1267 CELLS/UL (ref 850–3900)
LYMPHOCYTES NFR BLD AUTO: 23.9 %
MCH RBC QN AUTO: 31 PG (ref 27–33)
MCHC RBC AUTO-ENTMCNC: 33.5 G/DL (ref 32–36)
MCV RBC AUTO: 92.7 FL (ref 80–100)
MONOCYTES # BLD AUTO: 387 CELLS/UL (ref 200–950)
MONOCYTES NFR BLD AUTO: 7.3 %
NEUTROPHILS # BLD AUTO: 3535 CELLS/UL (ref 1500–7800)
NEUTROPHILS NFR BLD AUTO: 66.7 %
NONHDLC SERPL-MCNC: 130 MG/DL (CALC)
PLATELET # BLD AUTO: 237 THOUSAND/UL (ref 140–400)
PMV BLD REES-ECKER: 10.2 FL (ref 7.5–12.5)
POTASSIUM SERPL-SCNC: 4.3 MMOL/L (ref 3.5–5.3)
PROT SERPL-MCNC: 6.4 G/DL (ref 6.1–8.1)
PSA SERPL-MCNC: 0.6 NG/ML
RBC # BLD AUTO: 4.67 MILLION/UL (ref 4.2–5.8)
SL AMB EGFR AFRICAN AMERICAN: 86 ML/MIN/1.73M2
SL AMB EGFR NON AFRICAN AMERICAN: 74 ML/MIN/1.73M2
SODIUM SERPL-SCNC: 136 MMOL/L (ref 135–146)
TRIGL SERPL-MCNC: 96 MG/DL
TSH SERPL-ACNC: 1.63 MIU/L (ref 0.4–4.5)
URATE SERPL-MCNC: 6.3 MG/DL (ref 4–8)
WBC # BLD AUTO: 5.3 THOUSAND/UL (ref 3.8–10.8)

## 2018-09-08 NOTE — PROGRESS NOTES
Assessment/Plan:    Problem List Items Addressed This Visit        Digestive    GERD (gastroesophageal reflux disease)     STABLE  DENIES ANY CP, INDIGESTION, OR COUGH  NOTES NO MELENA OR HEMATOCHEZIA  NO HEMATEMESIS  COMPLIANT WITH MEDICATION  NO CONCERNS    - CONTINUE CURRENT TREATMENT PLAN  - MEDICATION AS PRESCRIBED  - AVOID CAFFEINE, ALCOHOL OR SMOKING           Relevant Orders    CBC and differential (Completed)       Respiratory    Mild intermittent asthma without complication     STABLE  TOLERATING MEDICATION WELL            Cardiovascular and Mediastinum    Hypertension - Primary     STABLE  DENIES ANY CP, SOB, PALPITATIONS, OR HEADACHE  NOTES NO WATER RETENTION  COMPLIANT WITH MEDICATION  NO CONCERNS    - CONTINUE CURRENT TREATMENT PLAN  - MONITOR DIETARY SODIUM INTAKE  - ENCOURAGE PHYSICAL ACTIVITY  - RV 6 MONTHS           Relevant Orders    POCT ECG (Completed)    POCT urine dip auto non-scope (Completed)    Comprehensive metabolic panel (Completed)    Uric acid (Completed)       Musculoskeletal and Integument    Generalized osteoarthritis of multiple sites     STABLE  DENIES ANY JOINT SWELLING OR REDNESS  JOINT STIFFNESS PRESENT  PAIN MANAGEMENT ADEQUATE    - CONTINUE CURRENT MANAGEMENT  - MEDICATION AS DIRECTED  - CALL / RETURN IF SYMPTOMS WORSEN           Gouty arthropathy       Genitourinary    Hyperplasia of prostate     STABLE  TOLERATING MEDICATION WELL            Other    Hyperlipidemia     WATCHING CHOLESTEROL INTAKE  COMPLIANT WITH MEDICATION  NO CONCERNS    - CONTINUE TO MONITOR DIETARY CHOL INTAKE  - CONTINUE CURRENT MEDICATION  - ENCOURAGED PHYSICAL ACTIVITY             Relevant Orders    Lipid panel (Completed)    TSH, 3rd generation with Free T4 reflex (Completed)    Colon cancer screening    Relevant Orders    POCT hemoccult screening (Completed)    Encounter for prostate cancer screening    Relevant Orders    PSA, Total Screen (Completed)    Screening for hypothyroidism    Relevant Orders    Lipid panel (Completed)    TSH, 3rd generation with Free T4 reflex (Completed)      Other Visit Diagnoses     Need for influenza vaccination        Relevant Orders    influenza vaccine, [id number], high-dose, PF 0 5 mL, for patients 65 yr+ (FLUZONE HIGH-DOSE) (Completed)          Patient Instructions   DISCUSSED HEALTH MAINTENENCE ISSUES  BW WILL BE OBTAINED  ENCOURAGED HEALTHY DIET AND EXERCISE  COLONOSCOPY WILL BE ORDERED  RV [address] 1 YEAR  RV SOONER IF THERE ARE ANY CONCERNS        Return in about 6 months (around 3/7/2019)  Subjective:      Patient ID: Megan Mina is a 71 y o  male  Chief Complaint   Patient presents with   Learta January Medicare Wellness Visit       PATIENT RETURNS FOR ANNUAL WELLNESS EXAM AND ANNUAL EVALUATION OF PATIENT'S MEDICAL ISSUES    INDIVIDUAL MEDICAL ISSUES WITH THEIR CURRENT STATUS, ASSESSMENT AND PLANS ARE LISTED ABOVE            The following portions of the patient's history were reviewed and updated as appropriate: allergies, current medications, past family history, past medical history, past social history, past surgical history and problem list     Review of Systems   Constitutional: Negative for chills, fatigue and fever  HENT: Negative for congestion, ear discharge, ear pain, mouth sores, postnasal drip, sore throat and trouble swallowing  Eyes: Negative for pain, discharge and visual disturbance  Respiratory: Negative for cough, shortness of breath and wheezing  Cardiovascular: Negative for chest pain, palpitations and leg swelling  Gastrointestinal: Negative for abdominal distention, abdominal pain, blood in stool, diarrhea and nausea  Endocrine: Negative for polydipsia, polyphagia and polyuria  Genitourinary: Negative for dysuria, frequency, hematuria and urgency  Musculoskeletal: Negative for arthralgias, gait problem and joint swelling  Skin: Negative for pallor and rash     Neurological: Negative for dizziness, syncope, speech difficulty, weakness, light-headedness, numbness and headaches  Hematological: Negative for adenopathy  Psychiatric/Behavioral: Negative for behavioral problems, confusion and sleep disturbance  The patient is nervous/anxious  Current Outpatient Prescriptions   Medication Sig Dispense Refill    desloratadine (CLARINEX) 5 MG tablet Take 1 tablet (5 mg total) by mouth daily 30 tablet 3    finasteride (PROSCAR) 5 mg tablet Take 1 tablet (5 mg total) by mouth daily 30 tablet 3    Glucosamine HCl (GLUCOSAMINE PO) Take by mouth daily      Mometasone Furoate (ASMANEX 30 METERED DOSES) 110 MCG/INH AEPB Inhale      nabumetone (RELAFEN) 750 mg tablet   0    tamsulosin (FLOMAX) 0 4 mg Take 1 capsule (0 4 mg total) by mouth 2 (two) times a day 180 capsule 0    zolpidem (AMBIEN) 10 mg tablet Take 1 tablet (10 mg total) by mouth daily at bedtime as needed for sleep 30 tablet 0    Mirabegron ER (MYRBETRIQ) 50 MG TB24 Take 1 tablet by mouth      Mirabegron ER (MYRBETRIQ) 50 MG TB24 Take 1 tablet (50 mg total) by mouth daily (Patient not taking: Reported on 9/7/2018 ) 30 tablet 3     No current facility-administered medications for this visit  Objective:    /70 (BP Location: Left arm, Patient Position: Sitting, Cuff Size: Standard)   Pulse 78   Temp (!) 97 °F (36 1 °C) (Temporal)   Resp 16   Ht 6' 1" (1 854 m)   Wt 85 9 kg (189 lb 6 4 oz)   SpO2 97%   BMI 24 99 kg/m²        Physical Exam   Constitutional: He is oriented to person, place, and time  He appears well-developed and well-nourished  HENT:   Head: Normocephalic and atraumatic  Right Ear: External ear normal    Left Ear: External ear normal    Nose: Nose normal    Mouth/Throat: Oropharynx is clear and moist    Eyes: Conjunctivae and EOM are normal  Pupils are equal, round, and reactive to light  Right eye exhibits no discharge  Left eye exhibits no discharge  Neck: Neck supple  No JVD present  No thyromegaly present  Cardiovascular: Normal rate, regular rhythm, normal heart sounds and intact distal pulses  No murmur heard  Pulmonary/Chest: Effort normal and breath sounds normal  He has no wheezes  He has no rales  Abdominal: Soft  Bowel sounds are normal  He exhibits no mass  There is no hepatosplenomegaly  There is no tenderness  There is no rebound, no guarding and no CVA tenderness  Genitourinary: Rectum normal, prostate normal and penis normal  Rectal exam shows guaiac negative stool  Musculoskeletal: Normal range of motion  He exhibits no edema, tenderness or deformity  Lymphadenopathy:     He has no cervical adenopathy  He has no axillary adenopathy  Neurological: He is alert and oriented to person, place, and time  He has normal reflexes  No cranial nerve deficit  He exhibits normal muscle tone  Coordination normal    Skin: Skin is warm and dry  No rash noted  No erythema  Psychiatric: He has a normal mood and affect   His behavior is normal  Judgment and thought content normal               Alonso Hale MD

## 2018-09-17 DIAGNOSIS — N40.0 PROSTATIC HYPERTROPHY: Primary | ICD-10-CM

## 2018-10-26 DIAGNOSIS — J45.20 MILD INTERMITTENT ASTHMA WITHOUT COMPLICATION: Primary | ICD-10-CM

## 2018-11-05 ENCOUNTER — OFFICE VISIT (OUTPATIENT)
Dept: FAMILY MEDICINE CLINIC | Facility: CLINIC | Age: 73
End: 2018-11-05
Payer: COMMERCIAL

## 2018-11-05 VITALS
HEIGHT: 73 IN | BODY MASS INDEX: 25.26 KG/M2 | OXYGEN SATURATION: 97 % | TEMPERATURE: 97.6 F | SYSTOLIC BLOOD PRESSURE: 110 MMHG | HEART RATE: 72 BPM | RESPIRATION RATE: 16 BRPM | DIASTOLIC BLOOD PRESSURE: 60 MMHG | WEIGHT: 190.6 LBS

## 2018-11-05 DIAGNOSIS — J45.20 MILD INTERMITTENT ASTHMA WITHOUT COMPLICATION: ICD-10-CM

## 2018-11-05 DIAGNOSIS — N40.0 HYPERPLASIA OF PROSTATE: ICD-10-CM

## 2018-11-05 DIAGNOSIS — Z01.818 PRE-OPERATIVE CLEARANCE: Primary | ICD-10-CM

## 2018-11-05 DIAGNOSIS — M10.9 GOUTY ARTHROPATHY: ICD-10-CM

## 2018-11-05 DIAGNOSIS — I10 ESSENTIAL HYPERTENSION: ICD-10-CM

## 2018-11-05 DIAGNOSIS — E78.01 FAMILIAL HYPERCHOLESTEROLEMIA: ICD-10-CM

## 2018-11-05 DIAGNOSIS — M15.9 GENERALIZED OSTEOARTHRITIS OF MULTIPLE SITES: ICD-10-CM

## 2018-11-05 DIAGNOSIS — K21.9 GASTROESOPHAGEAL REFLUX DISEASE WITHOUT ESOPHAGITIS: ICD-10-CM

## 2018-11-05 PROBLEM — Z12.11 COLON CANCER SCREENING: Status: RESOLVED | Noted: 2018-09-07 | Resolved: 2018-11-05

## 2018-11-05 PROBLEM — Z12.5 ENCOUNTER FOR PROSTATE CANCER SCREENING: Status: RESOLVED | Noted: 2018-09-07 | Resolved: 2018-11-05

## 2018-11-05 PROBLEM — Z13.29 SCREENING FOR HYPOTHYROIDISM: Status: RESOLVED | Noted: 2018-09-07 | Resolved: 2018-11-05

## 2018-11-05 PROCEDURE — 4040F PNEUMOC VAC/ADMIN/RCVD: CPT | Performed by: FAMILY MEDICINE

## 2018-11-05 PROCEDURE — 3078F DIAST BP <80 MM HG: CPT | Performed by: FAMILY MEDICINE

## 2018-11-05 PROCEDURE — 1160F RVW MEDS BY RX/DR IN RCRD: CPT | Performed by: FAMILY MEDICINE

## 2018-11-05 PROCEDURE — 99214 OFFICE O/P EST MOD 30 MIN: CPT | Performed by: FAMILY MEDICINE

## 2018-11-05 PROCEDURE — 3074F SYST BP LT 130 MM HG: CPT | Performed by: FAMILY MEDICINE

## 2018-11-05 PROCEDURE — 3008F BODY MASS INDEX DOCD: CPT | Performed by: FAMILY MEDICINE

## 2018-11-05 PROCEDURE — 1036F TOBACCO NON-USER: CPT | Performed by: FAMILY MEDICINE

## 2018-11-05 RX ORDER — NITROFURANTOIN 25; 75 MG/1; MG/1
CAPSULE ORAL EVERY 12 HOURS
Refills: 0 | COMMUNITY
Start: 2018-10-23 | End: 2019-06-21 | Stop reason: ALTCHOICE

## 2018-11-05 NOTE — PATIENT INSTRUCTIONS
THERE ARE NO OBVIOUS MEDICAL CONTRAINDICATIONS FOR SURGERY UNDER GENERAL ANESTHESIA    APPROPRIATE DVT PROPHYLAXIS SHOULD BE PROVIDED    PRE OPERATIVE ALBUTEROL TREATMENT SHOULD BE CONSIDERED

## 2018-11-05 NOTE — PROGRESS NOTES
Assessment/Plan:    Problem List Items Addressed This Visit        Digestive    GERD (gastroesophageal reflux disease)       Respiratory    Mild intermittent asthma without complication       Cardiovascular and Mediastinum    Hypertension       Musculoskeletal and Integument    Generalized osteoarthritis of multiple sites    Gouty arthropathy       Genitourinary    Hyperplasia of prostate       Other    Hyperlipidemia      Other Visit Diagnoses     Pre-operative clearance    -  Primary          Patient Instructions   THERE ARE NO OBVIOUS MEDICAL CONTRAINDICATIONS FOR SURGERY UNDER GENERAL ANESTHESIA    APPROPRIATE DVT PROPHYLAXIS SHOULD BE PROVIDED    PRE OPERATIVE ALBUTEROL TREATMENT SHOULD BE CONSIDERED      No Follow-up on file  Subjective:      Patient ID: Adali Stewart is a 68 y o  male  Chief Complaint   Patient presents with    Pre-op Exam     TURP Procedure 11/16/18 w/ Dr Yessi Walters - 11/16/2018  SURGEON - DR KATZ    PRE OP EKG , LABS WILL BE REVIEWED        The following portions of the patient's history were reviewed and updated as appropriate: allergies, current medications, past family history, past medical history, past social history, past surgical history and problem list     Review of Systems   Constitutional: Negative for chills, fatigue and fever  HENT: Negative for congestion, ear discharge, ear pain, mouth sores, postnasal drip, sore throat and trouble swallowing  Eyes: Negative for pain, discharge and visual disturbance  Respiratory: Negative for cough, shortness of breath and wheezing  Cardiovascular: Negative for chest pain, palpitations and leg swelling  Gastrointestinal: Negative for abdominal distention, abdominal pain, blood in stool, diarrhea and nausea  Endocrine: Negative for polydipsia, polyphagia and polyuria     Genitourinary: Positive for difficulty urinating, frequency and urgency  Negative for dysuria and hematuria  Musculoskeletal: Negative for arthralgias, gait problem and joint swelling  Skin: Negative for pallor and rash  Neurological: Negative for dizziness, syncope, speech difficulty, weakness, light-headedness, numbness and headaches  Hematological: Negative for adenopathy  Psychiatric/Behavioral: Negative for behavioral problems, confusion and sleep disturbance  The patient is not nervous/anxious  Current Outpatient Prescriptions   Medication Sig Dispense Refill    desloratadine (CLARINEX) 5 MG tablet Take 1 tablet (5 mg total) by mouth daily 30 tablet 3    finasteride (PROSCAR) 5 mg tablet Take 1 tablet (5 mg total) by mouth daily 30 tablet 3    Glucosamine HCl (GLUCOSAMINE PO) Take by mouth daily      mometasone (ASMANEX 30 METERED DOSES) 110 MCG/INH AEPB inhaler Inhale 1 puff daily 1 Inhaler 0    nabumetone (RELAFEN) 750 mg tablet   0    tamsulosin (FLOMAX) 0 4 mg Take 1 capsule (0 4 mg total) by mouth 2 (two) times a day 180 capsule 0    zolpidem (AMBIEN) 10 mg tablet Take 1 tablet (10 mg total) by mouth daily at bedtime as needed for sleep 30 tablet 0    Mirabegron ER (MYRBETRIQ) 50 MG TB24 Take 1 tablet (50 mg total) by mouth daily (Patient not taking: Reported on 9/7/2018 ) 30 tablet 3    Mirabegron ER (MYRBETRIQ) 50 MG TB24 Take 1 tablet (50 mg total) by mouth daily (Patient not taking: Reported on 11/5/2018 ) 30 tablet 0    nitrofurantoin (MACROBID) 100 mg capsule Take by mouth every 12 (twelve) hours  0     No current facility-administered medications for this visit  Objective:    /60 (BP Location: Left arm, Patient Position: Sitting, Cuff Size: Extra-Large)   Pulse 72   Temp 97 6 °F (36 4 °C) (Temporal)   Resp 16   Ht 6' 1" (1 854 m)   Wt 86 5 kg (190 lb 9 6 oz)   SpO2 97%   BMI 25 15 kg/m²        Physical Exam   Constitutional: He is oriented to person, place, and time  He appears well-developed and well-nourished  HENT:   Head: Normocephalic and atraumatic  Eyes: Pupils are equal, round, and reactive to light  Conjunctivae and EOM are normal  Right eye exhibits no discharge  Left eye exhibits no discharge  Neck: Neck supple  No JVD present  No thyromegaly present  Cardiovascular: Normal rate, regular rhythm and normal heart sounds  No murmur heard  Pulmonary/Chest: Effort normal and breath sounds normal  He has no wheezes  He has no rales  Abdominal: Soft  Bowel sounds are normal  He exhibits no mass  There is no hepatosplenomegaly  There is no tenderness  There is no rebound, no guarding and no CVA tenderness  Musculoskeletal: Normal range of motion  He exhibits no edema, tenderness or deformity  Lymphadenopathy:     He has no cervical adenopathy  He has no axillary adenopathy  Neurological: He is alert and oriented to person, place, and time  Skin: Skin is warm and dry  No rash noted  No erythema  Psychiatric: He has a normal mood and affect   His behavior is normal  Judgment and thought content normal               Victor M Velez MD

## 2018-11-17 DIAGNOSIS — G47.9 SLEEP DISTURBANCE: ICD-10-CM

## 2018-11-17 RX ORDER — ZOLPIDEM TARTRATE 10 MG/1
10 TABLET ORAL
Qty: 30 TABLET | Refills: 0 | Status: SHIPPED | OUTPATIENT
Start: 2018-11-17 | End: 2018-11-20 | Stop reason: SDUPTHER

## 2018-11-20 DIAGNOSIS — G47.9 SLEEP DISTURBANCE: ICD-10-CM

## 2018-11-20 RX ORDER — ZOLPIDEM TARTRATE 10 MG/1
10 TABLET ORAL
Qty: 30 TABLET | Refills: 1 | Status: SHIPPED | OUTPATIENT
Start: 2018-11-20 | End: 2018-11-21 | Stop reason: SDUPTHER

## 2018-11-21 DIAGNOSIS — G47.9 SLEEP DISTURBANCE: ICD-10-CM

## 2018-11-22 RX ORDER — ZOLPIDEM TARTRATE 10 MG/1
10 TABLET ORAL
Qty: 30 TABLET | Refills: 0 | Status: SHIPPED | OUTPATIENT
Start: 2018-11-22 | End: 2018-12-21 | Stop reason: SDUPTHER

## 2018-12-21 DIAGNOSIS — G47.9 SLEEP DISTURBANCE: ICD-10-CM

## 2018-12-21 RX ORDER — ZOLPIDEM TARTRATE 10 MG/1
10 TABLET ORAL
Qty: 30 TABLET | Refills: 0 | Status: SHIPPED | OUTPATIENT
Start: 2018-12-21 | End: 2019-01-11 | Stop reason: SDUPTHER

## 2019-01-11 DIAGNOSIS — G47.9 SLEEP DISTURBANCE: ICD-10-CM

## 2019-01-11 RX ORDER — ZOLPIDEM TARTRATE 10 MG/1
10 TABLET ORAL
Qty: 30 TABLET | Refills: 0 | Status: SHIPPED | OUTPATIENT
Start: 2019-01-11 | End: 2019-01-11 | Stop reason: SDUPTHER

## 2019-01-11 RX ORDER — ZOLPIDEM TARTRATE 10 MG/1
10 TABLET ORAL
Qty: 30 TABLET | Refills: 0 | Status: SHIPPED | OUTPATIENT
Start: 2019-01-11 | End: 2019-02-13 | Stop reason: SDUPTHER

## 2019-02-13 DIAGNOSIS — G47.9 SLEEP DISTURBANCE: ICD-10-CM

## 2019-02-13 RX ORDER — ZOLPIDEM TARTRATE 10 MG/1
10 TABLET ORAL
Qty: 30 TABLET | Refills: 3 | Status: SHIPPED | OUTPATIENT
Start: 2019-02-13 | End: 2019-06-12 | Stop reason: SDUPTHER

## 2019-02-13 NOTE — TELEPHONE ENCOUNTER
Pt called requesting a refill for Zolpidem (ambien) 10 mg be called in to the [address] which was the old [facility] along with a few refill if possible so he doesn't have to call back in every month   One tab po hs

## 2019-06-12 DIAGNOSIS — G47.9 SLEEP DISTURBANCE: ICD-10-CM

## 2019-06-12 RX ORDER — ZOLPIDEM TARTRATE 10 MG/1
10 TABLET ORAL
Qty: 30 TABLET | Refills: 3 | Status: SHIPPED | OUTPATIENT
Start: 2019-06-12 | End: 2019-06-21

## 2019-06-21 ENCOUNTER — OFFICE VISIT (OUTPATIENT)
Dept: FAMILY MEDICINE CLINIC | Facility: CLINIC | Age: 74
End: 2019-06-21
Payer: COMMERCIAL

## 2019-06-21 VITALS
TEMPERATURE: 97 F | HEIGHT: 73 IN | WEIGHT: 189 LBS | OXYGEN SATURATION: 98 % | BODY MASS INDEX: 25.05 KG/M2 | HEART RATE: 58 BPM | DIASTOLIC BLOOD PRESSURE: 60 MMHG | RESPIRATION RATE: 12 BRPM | SYSTOLIC BLOOD PRESSURE: 120 MMHG

## 2019-06-21 DIAGNOSIS — M15.9 GENERALIZED OSTEOARTHRITIS OF MULTIPLE SITES: ICD-10-CM

## 2019-06-21 DIAGNOSIS — E78.01 FAMILIAL HYPERCHOLESTEROLEMIA: ICD-10-CM

## 2019-06-21 DIAGNOSIS — J45.20 MILD INTERMITTENT ASTHMA WITHOUT COMPLICATION: ICD-10-CM

## 2019-06-21 DIAGNOSIS — M10.9 GOUTY ARTHROPATHY: ICD-10-CM

## 2019-06-21 DIAGNOSIS — N40.0 PROSTATIC HYPERTROPHY: ICD-10-CM

## 2019-06-21 DIAGNOSIS — K21.9 GASTROESOPHAGEAL REFLUX DISEASE WITHOUT ESOPHAGITIS: ICD-10-CM

## 2019-06-21 DIAGNOSIS — Z00.00 MEDICARE ANNUAL WELLNESS VISIT, SUBSEQUENT: ICD-10-CM

## 2019-06-21 DIAGNOSIS — Z12.5 ENCOUNTER FOR PROSTATE CANCER SCREENING: ICD-10-CM

## 2019-06-21 DIAGNOSIS — I10 ESSENTIAL HYPERTENSION: Primary | ICD-10-CM

## 2019-06-21 DIAGNOSIS — Z13.29 SCREENING FOR HYPOTHYROIDISM: ICD-10-CM

## 2019-06-21 DIAGNOSIS — Z12.11 COLON CANCER SCREENING: ICD-10-CM

## 2019-06-21 DIAGNOSIS — G47.9 SLEEP DISTURBANCE: ICD-10-CM

## 2019-06-21 DIAGNOSIS — N40.0 HYPERPLASIA OF PROSTATE: ICD-10-CM

## 2019-06-21 LAB
SL AMB  POCT GLUCOSE, UA: 0
SL AMB LEUKOCYTE ESTERASE,UA: 0
SL AMB POCT BILIRUBIN,UA: 0
SL AMB POCT BLOOD,UA: 0
SL AMB POCT CLARITY,UA: CLEAR
SL AMB POCT COLOR,UA: YELLOW
SL AMB POCT FECES OCC BLD: NORMAL
SL AMB POCT KETONES,UA: 0
SL AMB POCT NITRITE,UA: 0
SL AMB POCT PH,UA: 5
SL AMB POCT SPECIFIC GRAVITY,UA: 1.01
SL AMB POCT URINE PROTEIN: 0
SL AMB POCT UROBILINOGEN: 0

## 2019-06-21 PROCEDURE — 3074F SYST BP LT 130 MM HG: CPT | Performed by: FAMILY MEDICINE

## 2019-06-21 PROCEDURE — 36415 COLL VENOUS BLD VENIPUNCTURE: CPT | Performed by: FAMILY MEDICINE

## 2019-06-21 PROCEDURE — 1170F FXNL STATUS ASSESSED: CPT

## 2019-06-21 PROCEDURE — 93000 ELECTROCARDIOGRAM COMPLETE: CPT | Performed by: FAMILY MEDICINE

## 2019-06-21 PROCEDURE — 1160F RVW MEDS BY RX/DR IN RCRD: CPT | Performed by: FAMILY MEDICINE

## 2019-06-21 PROCEDURE — 3008F BODY MASS INDEX DOCD: CPT | Performed by: FAMILY MEDICINE

## 2019-06-21 PROCEDURE — 81003 URINALYSIS AUTO W/O SCOPE: CPT | Performed by: FAMILY MEDICINE

## 2019-06-21 PROCEDURE — 96374 THER/PROPH/DIAG INJ IV PUSH: CPT | Performed by: FAMILY MEDICINE

## 2019-06-21 PROCEDURE — G0439 PPPS, SUBSEQ VISIT: HCPCS | Performed by: FAMILY MEDICINE

## 2019-06-21 PROCEDURE — 82270 OCCULT BLOOD FECES: CPT | Performed by: FAMILY MEDICINE

## 2019-06-21 PROCEDURE — 3078F DIAST BP <80 MM HG: CPT | Performed by: FAMILY MEDICINE

## 2019-06-21 PROCEDURE — 1125F AMNT PAIN NOTED PAIN PRSNT: CPT

## 2019-06-21 PROCEDURE — 1036F TOBACCO NON-USER: CPT | Performed by: FAMILY MEDICINE

## 2019-06-21 PROCEDURE — 99215 OFFICE O/P EST HI 40 MIN: CPT | Performed by: FAMILY MEDICINE

## 2019-06-21 RX ORDER — METHYLPREDNISOLONE ACETATE 80 MG/ML
80 INJECTION, SUSPENSION INTRA-ARTICULAR; INTRALESIONAL; INTRAMUSCULAR; SOFT TISSUE ONCE
Status: COMPLETED | OUTPATIENT
Start: 2019-06-21 | End: 2019-06-21

## 2019-06-21 RX ORDER — ZOLPIDEM TARTRATE 12.5 MG/1
12.5 TABLET, FILM COATED, EXTENDED RELEASE ORAL
Qty: 30 TABLET | Refills: 1 | Status: SHIPPED | OUTPATIENT
Start: 2019-06-21 | End: 2019-08-02

## 2019-06-21 RX ORDER — TAMSULOSIN HYDROCHLORIDE 0.4 MG/1
0.8 CAPSULE ORAL
Qty: 60 CAPSULE | Refills: 3 | Status: SHIPPED | OUTPATIENT
Start: 2019-06-21 | End: 2019-07-08 | Stop reason: SDUPTHER

## 2019-06-21 RX ADMIN — METHYLPREDNISOLONE ACETATE 80 MG: 80 INJECTION, SUSPENSION INTRA-ARTICULAR; INTRALESIONAL; INTRAMUSCULAR; SOFT TISSUE at 08:44

## 2019-06-22 LAB
ALBUMIN SERPL-MCNC: 4.2 G/DL (ref 3.5–4.8)
ALBUMIN/GLOB SERPL: 2.1 {RATIO} (ref 1.2–2.2)
ALP SERPL-CCNC: 73 IU/L (ref 39–117)
ALT SERPL-CCNC: 12 IU/L (ref 0–44)
AST SERPL-CCNC: 15 IU/L (ref 0–40)
BASOPHILS # BLD AUTO: 0 X10E3/UL (ref 0–0.2)
BASOPHILS NFR BLD AUTO: 0 %
BILIRUB SERPL-MCNC: 0.3 MG/DL (ref 0–1.2)
BUN SERPL-MCNC: 12 MG/DL (ref 8–27)
BUN/CREAT SERPL: 13 (ref 10–24)
CALCIUM SERPL-MCNC: 9.6 MG/DL (ref 8.6–10.2)
CHLORIDE SERPL-SCNC: 99 MMOL/L (ref 96–106)
CHOLEST SERPL-MCNC: 189 MG/DL (ref 100–199)
CHOLEST/HDLC SERPL: 3.9 RATIO (ref 0–5)
CO2 SERPL-SCNC: 27 MMOL/L (ref 20–29)
CREAT SERPL-MCNC: 0.91 MG/DL (ref 0.76–1.27)
EOSINOPHIL # BLD AUTO: 0.1 X10E3/UL (ref 0–0.4)
EOSINOPHIL NFR BLD AUTO: 2 %
ERYTHROCYTE [DISTWIDTH] IN BLOOD BY AUTOMATED COUNT: 14 % (ref 12.3–15.4)
GLOBULIN SER-MCNC: 2 G/DL (ref 1.5–4.5)
GLUCOSE SERPL-MCNC: 90 MG/DL (ref 65–99)
HCT VFR BLD AUTO: 39.7 % (ref 37.5–51)
HDLC SERPL-MCNC: 49 MG/DL
HGB BLD-MCNC: 13.3 G/DL (ref 13–17.7)
IMM GRANULOCYTES # BLD: 0 X10E3/UL (ref 0–0.1)
IMM GRANULOCYTES NFR BLD: 0 %
LDLC SERPL CALC-MCNC: 119 MG/DL (ref 0–99)
LYMPHOCYTES # BLD AUTO: 1.9 X10E3/UL (ref 0.7–3.1)
LYMPHOCYTES NFR BLD AUTO: 38 %
MCH RBC QN AUTO: 30 PG (ref 26.6–33)
MCHC RBC AUTO-ENTMCNC: 33.5 G/DL (ref 31.5–35.7)
MCV RBC AUTO: 89 FL (ref 79–97)
MONOCYTES # BLD AUTO: 0.5 X10E3/UL (ref 0.1–0.9)
MONOCYTES NFR BLD AUTO: 10 %
NEUTROPHILS # BLD AUTO: 2.6 X10E3/UL (ref 1.4–7)
NEUTROPHILS NFR BLD AUTO: 50 %
PLATELET # BLD AUTO: 227 X10E3/UL (ref 150–450)
POTASSIUM SERPL-SCNC: 4.1 MMOL/L (ref 3.5–5.2)
PROT SERPL-MCNC: 6.2 G/DL (ref 6–8.5)
PSA SERPL-MCNC: 0.6 NG/ML (ref 0–4)
RBC # BLD AUTO: 4.44 X10E6/UL (ref 4.14–5.8)
SL AMB EGFR AFRICAN AMERICAN: 97 ML/MIN/1.73
SL AMB EGFR NON AFRICAN AMERICAN: 84 ML/MIN/1.73
SL AMB REFLEX CRITERIA: NORMAL
SL AMB VLDL CHOLESTEROL CALC: 21 MG/DL (ref 5–40)
SODIUM SERPL-SCNC: 140 MMOL/L (ref 134–144)
TRIGL SERPL-MCNC: 103 MG/DL (ref 0–149)
TSH SERPL DL<=0.005 MIU/L-ACNC: 2.8 UIU/ML (ref 0.45–4.5)
URATE SERPL-MCNC: 6.8 MG/DL (ref 3.7–8.6)
WBC # BLD AUTO: 5.1 X10E3/UL (ref 3.4–10.8)

## 2019-07-08 DIAGNOSIS — N40.0 PROSTATIC HYPERTROPHY: ICD-10-CM

## 2019-07-08 RX ORDER — TAMSULOSIN HYDROCHLORIDE 0.4 MG/1
0.8 CAPSULE ORAL
Qty: 60 CAPSULE | Refills: 3 | Status: SHIPPED | OUTPATIENT
Start: 2019-07-08 | End: 2019-09-11 | Stop reason: SDUPTHER

## 2019-07-08 RX ORDER — TAMSULOSIN HYDROCHLORIDE 0.4 MG/1
0.8 CAPSULE ORAL
Qty: 60 CAPSULE | Refills: 3 | Status: SHIPPED | OUTPATIENT
Start: 2019-07-08 | End: 2019-07-08 | Stop reason: SDUPTHER

## 2019-07-18 ENCOUNTER — TELEPHONE (OUTPATIENT)
Dept: FAMILY MEDICINE CLINIC | Facility: CLINIC | Age: 74
End: 2019-07-18

## 2019-07-18 DIAGNOSIS — M10.9 GOUT OF MULTIPLE SITES, UNSPECIFIED CAUSE, UNSPECIFIED CHRONICITY: Primary | ICD-10-CM

## 2019-07-18 RX ORDER — COLCHICINE 0.6 MG/1
0.6 TABLET ORAL 2 TIMES DAILY
Qty: 60 TABLET | Refills: 3 | Status: SHIPPED | OUTPATIENT
Start: 2019-07-18 | End: 2021-03-15 | Stop reason: SDUPTHER

## 2019-07-18 NOTE — TELEPHONE ENCOUNTER
Colchicine   6mg take 1 tablet twice per day as needed for Gout/Arthritis  #40     Had flare up previously and used the rest of the meds, & needs some for next time      [employer] [facility] please

## 2019-08-02 ENCOUNTER — TELEPHONE (OUTPATIENT)
Dept: FAMILY MEDICINE CLINIC | Facility: CLINIC | Age: 74
End: 2019-08-02

## 2019-08-02 DIAGNOSIS — G47.9 SLEEP DISTURBANCE: Primary | ICD-10-CM

## 2019-08-02 NOTE — TELEPHONE ENCOUNTER
Laine Suarez is a former [employer] employee  He can get the sleep aide called Belsomra at no cost   He wants to know before that is prescribed, IS SAFE FOR HIM? If yes, please send to [facility] in [facility]    If not, please order the Ambien 10 mg

## 2019-08-13 DIAGNOSIS — G47.9 DISTURBANCE OF SLEEP: Primary | ICD-10-CM

## 2019-08-13 DIAGNOSIS — G47.9 SLEEP DISTURBANCE: Primary | ICD-10-CM

## 2019-08-13 RX ORDER — ZOLPIDEM TARTRATE 10 MG/1
10 TABLET ORAL
Qty: 30 TABLET | Refills: 3 | Status: SHIPPED | OUTPATIENT
Start: 2019-08-13 | End: 2019-08-13 | Stop reason: SDUPTHER

## 2019-08-13 RX ORDER — ZOLPIDEM TARTRATE 10 MG/1
10 TABLET ORAL
Qty: 30 TABLET | Refills: 3 | Status: SHIPPED | OUTPATIENT
Start: 2019-08-13 | End: 2019-11-05 | Stop reason: SDUPTHER

## 2019-08-13 RX ORDER — ZOLPIDEM TARTRATE 10 MG/1
10 TABLET ORAL
Qty: 30 TABLET | Refills: 3 | Status: CANCELLED | OUTPATIENT
Start: 2019-08-13

## 2019-08-13 RX ORDER — ZOLPIDEM TARTRATE 10 MG/1
10 TABLET ORAL
COMMUNITY
End: 2019-08-13 | Stop reason: SDUPTHER

## 2019-08-13 NOTE — TELEPHONE ENCOUNTER
You had ordered Belsomra, however, the pharmacy is requiring justification  He does not want to go through the hassel  He is asking for the Zolpidem 10 mg with refills to go to the [facility]  Thank you    Damien Lamb

## 2019-09-11 DIAGNOSIS — N40.0 PROSTATIC HYPERTROPHY: ICD-10-CM

## 2019-09-11 RX ORDER — TAMSULOSIN HYDROCHLORIDE 0.4 MG/1
0.8 CAPSULE ORAL
Qty: 180 CAPSULE | Refills: 3 | Status: SHIPPED | OUTPATIENT
Start: 2019-09-11 | End: 2019-09-11 | Stop reason: SDUPTHER

## 2019-09-11 RX ORDER — TAMSULOSIN HYDROCHLORIDE 0.4 MG/1
0.8 CAPSULE ORAL
Qty: 180 CAPSULE | Refills: 3 | Status: SHIPPED | OUTPATIENT
Start: 2019-09-11 | End: 2019-11-05 | Stop reason: SDUPTHER

## 2019-10-14 DIAGNOSIS — M15.9 GENERALIZED OSTEOARTHRITIS OF MULTIPLE SITES: Primary | ICD-10-CM

## 2019-10-14 RX ORDER — NABUMETONE 500 MG/1
TABLET, FILM COATED ORAL
Qty: 180 TABLET | Refills: 3 | Status: SHIPPED | OUTPATIENT
Start: 2019-10-14 | End: 2019-11-05 | Stop reason: SDUPTHER

## 2019-11-05 ENCOUNTER — OFFICE VISIT (OUTPATIENT)
Dept: FAMILY MEDICINE CLINIC | Facility: CLINIC | Age: 74
End: 2019-11-05
Payer: COMMERCIAL

## 2019-11-05 VITALS
HEIGHT: 73 IN | BODY MASS INDEX: 25.45 KG/M2 | RESPIRATION RATE: 18 BRPM | HEART RATE: 60 BPM | DIASTOLIC BLOOD PRESSURE: 70 MMHG | OXYGEN SATURATION: 97 % | TEMPERATURE: 97.3 F | WEIGHT: 192 LBS | SYSTOLIC BLOOD PRESSURE: 128 MMHG

## 2019-11-05 DIAGNOSIS — G47.9 SLEEP DISTURBANCE: ICD-10-CM

## 2019-11-05 DIAGNOSIS — M15.9 GENERALIZED OSTEOARTHRITIS OF MULTIPLE SITES: ICD-10-CM

## 2019-11-05 DIAGNOSIS — N40.0 PROSTATIC HYPERTROPHY: ICD-10-CM

## 2019-11-05 DIAGNOSIS — J45.20 MILD INTERMITTENT ASTHMA WITHOUT COMPLICATION: ICD-10-CM

## 2019-11-05 PROCEDURE — 99213 OFFICE O/P EST LOW 20 MIN: CPT | Performed by: FAMILY MEDICINE

## 2019-11-05 PROCEDURE — 3008F BODY MASS INDEX DOCD: CPT | Performed by: FAMILY MEDICINE

## 2019-11-05 RX ORDER — FINASTERIDE 5 MG/1
5 TABLET, FILM COATED ORAL DAILY
Qty: 90 TABLET | Refills: 1 | Status: SHIPPED | OUTPATIENT
Start: 2019-11-05 | End: 2020-09-10 | Stop reason: ALTCHOICE

## 2019-11-05 RX ORDER — ZOLPIDEM TARTRATE 10 MG/1
10 TABLET ORAL
Qty: 30 TABLET | Refills: 3 | Status: SHIPPED | OUTPATIENT
Start: 2019-11-05 | End: 2019-11-05 | Stop reason: SDUPTHER

## 2019-11-05 RX ORDER — ZOLPIDEM TARTRATE 10 MG/1
10 TABLET ORAL
Qty: 90 TABLET | Refills: 3 | Status: SHIPPED | OUTPATIENT
Start: 2019-11-05 | End: 2020-04-29 | Stop reason: SDUPTHER

## 2019-11-05 RX ORDER — TAMSULOSIN HYDROCHLORIDE 0.4 MG/1
0.8 CAPSULE ORAL
Qty: 180 CAPSULE | Refills: 1 | Status: SHIPPED | OUTPATIENT
Start: 2019-11-05 | End: 2020-09-10 | Stop reason: SDUPTHER

## 2019-11-05 RX ORDER — NABUMETONE 750 MG/1
750 TABLET, FILM COATED ORAL 2 TIMES DAILY
Qty: 180 TABLET | Refills: 1 | Status: SHIPPED | OUTPATIENT
Start: 2019-11-05 | End: 2020-10-22

## 2019-11-05 RX ORDER — TAMSULOSIN HYDROCHLORIDE 0.4 MG/1
0.8 CAPSULE ORAL
Qty: 60 CAPSULE | Refills: 3 | Status: SHIPPED | OUTPATIENT
Start: 2019-11-05 | End: 2019-11-05 | Stop reason: SDUPTHER

## 2019-11-05 RX ORDER — FINASTERIDE 5 MG/1
5 TABLET, FILM COATED ORAL DAILY
Qty: 30 TABLET | Refills: 3 | Status: SHIPPED | OUTPATIENT
Start: 2019-11-05 | End: 2019-11-05 | Stop reason: SDUPTHER

## 2019-11-05 RX ORDER — NABUMETONE 750 MG/1
750 TABLET, FILM COATED ORAL 2 TIMES DAILY
Qty: 60 TABLET | Refills: 3 | Status: SHIPPED | OUTPATIENT
Start: 2019-11-05 | End: 2019-11-05 | Stop reason: SDUPTHER

## 2019-11-05 NOTE — PROGRESS NOTES
Assessment/Plan:    No problem-specific Assessment & Plan notes found for this encounter  Diagnoses and all orders for this visit:    Sleep disturbance  -     Discontinue: zolpidem (AMBIEN) 10 mg tablet; Take 1 tablet (10 mg total) by mouth daily at bedtime as needed for sleep  -     zolpidem (AMBIEN) 10 mg tablet; Take 1 tablet (10 mg total) by mouth daily at bedtime as needed for sleep    Prostatic hypertrophy  -     Discontinue: tamsulosin (FLOMAX) 0 4 mg; Take 2 capsules (0 8 mg total) by mouth daily at bedtime  -     Discontinue: Mirabegron ER (MYRBETRIQ) 50 MG TB24; Take 1 tablet (50 mg total) by mouth daily  -     Discontinue: finasteride (PROSCAR) 5 mg tablet; Take 1 tablet (5 mg total) by mouth daily  -     tamsulosin (FLOMAX) 0 4 mg; Take 2 capsules (0 8 mg total) by mouth daily at bedtime  -     Mirabegron ER (MYRBETRIQ) 50 MG TB24; Take 1 tablet (50 mg total) by mouth daily  -     finasteride (PROSCAR) 5 mg tablet; Take 1 tablet (5 mg total) by mouth daily    Generalized osteoarthritis of multiple sites  -     Discontinue: nabumetone (RELAFEN) 750 mg tablet; Take 1 tablet (750 mg total) by mouth 2 (two) times a day  -     nabumetone (RELAFEN) 750 mg tablet; Take 1 tablet (750 mg total) by mouth 2 (two) times a day    Mild intermittent asthma without complication  -     Discontinue: mometasone (ASMANEX, 30 METERED DOSES,) 110 MCG/INH AEPB inhaler; Inhale 1 puff daily  -     mometasone (ASMANEX, 30 METERED DOSES,) 110 MCG/INH AEPB inhaler; Inhale 1 puff daily          Patient Instructions   DISCUSSED THERAPEUTIC OPTIONS  WILL RESTART FINESTERIDE  MAY NEED TO UNDERGO ANOTHER TURP    RX RENEWED      Return if symptoms worsen or fail to improve, for Next scheduled follow up  Subjective:      Patient ID: Laurie Schaumann is a 69 y o  male      Chief Complaint   Patient presents with    discussion     prostate       DISCUSSION    DISCUSSED ISSUES RELATED TO HIS BPH AND NOCTURIA  REVIEWED OPTIONS  MAY NEED UROLOGY CONSULT      The following portions of the patient's history were reviewed and updated as appropriate: allergies, current medications, past family history, past medical history, past social history, past surgical history and problem list     Review of Systems      Current Outpatient Medications   Medication Sig Dispense Refill    colchicine (COLCRYS) 0 6 mg tablet Take 1 tablet (0 6 mg total) by mouth 2 (two) times a day 60 tablet 3    desloratadine (CLARINEX) 5 MG tablet Take 1 tablet (5 mg total) by mouth daily 30 tablet 3    finasteride (PROSCAR) 5 mg tablet Take 1 tablet (5 mg total) by mouth daily 90 tablet 1    mometasone (ASMANEX, 30 METERED DOSES,) 110 MCG/INH AEPB inhaler Inhale 1 puff daily 3 Inhaler 1    nabumetone (RELAFEN) 750 mg tablet Take 1 tablet (750 mg total) by mouth 2 (two) times a day 180 tablet 1    tamsulosin (FLOMAX) 0 4 mg Take 2 capsules (0 8 mg total) by mouth daily at bedtime 180 capsule 1    zolpidem (AMBIEN) 10 mg tablet Take 1 tablet (10 mg total) by mouth daily at bedtime as needed for sleep 90 tablet 3    Mirabegron ER (MYRBETRIQ) 50 MG TB24 Take 1 tablet (50 mg total) by mouth daily 90 tablet 1     No current facility-administered medications for this visit          Objective:    /70   Pulse 60   Temp (!) 97 3 °F (36 3 °C) (Temporal)   Resp 18   Ht 6' 1" (1 854 m)   Wt 87 1 kg (192 lb)   SpO2 97%   BMI 25 33 kg/m²        Physical Exam         NO PE WAS PERFORMED    LENGTH OF VISIT 15 MIN  LENGTH OF  15 MIN    Mackenzie Matute MD

## 2019-11-05 NOTE — PATIENT INSTRUCTIONS
DISCUSSED THERAPEUTIC OPTIONS  WILL RESTART FINESTERIDE  MAY NEED TO UNDERGO ANOTHER TURP    RX RENEWED

## 2019-11-07 DIAGNOSIS — N40.0 PROSTATIC HYPERTROPHY: ICD-10-CM

## 2019-11-08 RX ORDER — TAMSULOSIN HYDROCHLORIDE 0.4 MG/1
CAPSULE ORAL
Qty: 180 CAPSULE | Refills: 3 | Status: SHIPPED | OUTPATIENT
Start: 2019-11-08 | End: 2020-09-10 | Stop reason: SDUPTHER

## 2019-11-11 DIAGNOSIS — J45.20 MILD INTERMITTENT ASTHMA WITHOUT COMPLICATION: ICD-10-CM

## 2019-11-13 ENCOUNTER — TELEPHONE (OUTPATIENT)
Dept: FAMILY MEDICINE CLINIC | Facility: CLINIC | Age: 74
End: 2019-11-13

## 2019-11-13 DIAGNOSIS — J45.20 MILD INTERMITTENT ASTHMA WITHOUT COMPLICATION: Primary | ICD-10-CM

## 2019-11-13 NOTE — TELEPHONE ENCOUNTER
[facility] Asmanex 110 30 puffs is on back order  Please call something else in instead      [facility]

## 2019-12-10 ENCOUNTER — TELEPHONE (OUTPATIENT)
Dept: FAMILY MEDICINE CLINIC | Facility: CLINIC | Age: 74
End: 2019-12-10

## 2019-12-10 NOTE — TELEPHONE ENCOUNTER
PLEASE CALL BACK  UNFORTUNATELY I CANT GET WHAT MEDICATIONS ARE ON FORMULARY  HE SHOULD CALL AND SEE WHAT THEY WOULD COVER ?  [facility]

## 2019-12-10 NOTE — TELEPHONE ENCOUNTER
Asmanex is on backorder and Pulmicort is not covered by his insurance  Leonard would like to know if there is something else that can be called into his mailorder for him  He states he doesn't care what med it is  [facility]

## 2019-12-11 NOTE — TELEPHONE ENCOUNTER
Spoke to Geoff Mendoza  He states he will call his insurance co and call us back with what is covered

## 2019-12-24 ENCOUNTER — TELEPHONE (OUTPATIENT)
Dept: FAMILY MEDICINE CLINIC | Facility: CLINIC | Age: 74
End: 2019-12-24

## 2019-12-24 DIAGNOSIS — G47.9 SLEEP DISTURBANCE: Primary | ICD-10-CM

## 2019-12-24 NOTE — TELEPHONE ENCOUNTER
Win Fierro would like for you to prescribe for him Belsomra for his sleep aide instead of the Zolpidem  As a former [employer] employee, they will pay for it for 100%      Send to [id number]

## 2020-01-16 DIAGNOSIS — J45.20 MILD INTERMITTENT ASTHMA WITHOUT COMPLICATION: ICD-10-CM

## 2020-01-20 ENCOUNTER — TELEPHONE (OUTPATIENT)
Dept: FAMILY MEDICINE CLINIC | Facility: CLINIC | Age: 75
End: 2020-01-20

## 2020-01-20 DIAGNOSIS — J45.20 MILD INTERMITTENT ASTHMA WITHOUT COMPLICATION: ICD-10-CM

## 2020-01-20 NOTE — TELEPHONE ENCOUNTER
mometasone (ASMANEX, 30 METERED DOSES,) 110 MCG/INH AEPB inhaler that was last week is on  back order      Please send in an alternate to [facility]

## 2020-02-20 ENCOUNTER — TELEPHONE (OUTPATIENT)
Dept: FAMILY MEDICINE CLINIC | Facility: CLINIC | Age: 75
End: 2020-02-20

## 2020-02-20 NOTE — TELEPHONE ENCOUNTER
Please send to [facility]    mometasone (ASMANEX, 30 METERED DOSES,) 110 MCG/INH AEPB     It was on back order  He wants to see if they have it now  You origngly sent this in to [facility] and it was on Back order  So you call in budesonide (PULMICORT) 180 MCG/ACT inhaler  Umm Sees cancelled it with [facility]      Send to [facility]

## 2020-02-21 DIAGNOSIS — J45.20 MILD INTERMITTENT ASTHMA WITHOUT COMPLICATION: Primary | ICD-10-CM

## 2020-04-25 DIAGNOSIS — G47.9 SLEEP DISTURBANCE: ICD-10-CM

## 2020-04-26 RX ORDER — ZOLPIDEM TARTRATE 10 MG/1
TABLET ORAL
Qty: 90 TABLET | Refills: 1 | OUTPATIENT
Start: 2020-04-26

## 2020-04-27 DIAGNOSIS — G47.9 SLEEP DISTURBANCE: ICD-10-CM

## 2020-04-27 RX ORDER — ZOLPIDEM TARTRATE 10 MG/1
10 TABLET ORAL
Qty: 90 TABLET | Refills: 3 | OUTPATIENT
Start: 2020-04-27

## 2020-04-29 DIAGNOSIS — G47.9 SLEEP DISTURBANCE: ICD-10-CM

## 2020-05-11 RX ORDER — ZOLPIDEM TARTRATE 10 MG/1
10 TABLET ORAL
Qty: 90 TABLET | Refills: 3 | Status: SHIPPED | OUTPATIENT
Start: 2020-05-11 | End: 2020-05-29

## 2020-05-29 ENCOUNTER — OFFICE VISIT (OUTPATIENT)
Dept: FAMILY MEDICINE CLINIC | Facility: CLINIC | Age: 75
End: 2020-05-29
Payer: COMMERCIAL

## 2020-05-29 ENCOUNTER — TELEPHONE (OUTPATIENT)
Dept: ADMINISTRATIVE | Facility: OTHER | Age: 75
End: 2020-05-29

## 2020-05-29 VITALS
HEART RATE: 66 BPM | WEIGHT: 194.6 LBS | TEMPERATURE: 97.1 F | DIASTOLIC BLOOD PRESSURE: 70 MMHG | OXYGEN SATURATION: 97 % | RESPIRATION RATE: 16 BRPM | HEIGHT: 73 IN | SYSTOLIC BLOOD PRESSURE: 126 MMHG | BODY MASS INDEX: 25.79 KG/M2

## 2020-05-29 DIAGNOSIS — N40.0 HYPERPLASIA OF PROSTATE: Primary | ICD-10-CM

## 2020-05-29 DIAGNOSIS — G47.9 DISTURBANCE OF SLEEP: ICD-10-CM

## 2020-05-29 PROCEDURE — 3074F SYST BP LT 130 MM HG: CPT | Performed by: FAMILY MEDICINE

## 2020-05-29 PROCEDURE — 99213 OFFICE O/P EST LOW 20 MIN: CPT | Performed by: FAMILY MEDICINE

## 2020-05-29 PROCEDURE — 1036F TOBACCO NON-USER: CPT | Performed by: FAMILY MEDICINE

## 2020-05-29 PROCEDURE — 3008F BODY MASS INDEX DOCD: CPT | Performed by: FAMILY MEDICINE

## 2020-05-29 PROCEDURE — 4040F PNEUMOC VAC/ADMIN/RCVD: CPT | Performed by: FAMILY MEDICINE

## 2020-05-29 PROCEDURE — 3078F DIAST BP <80 MM HG: CPT | Performed by: FAMILY MEDICINE

## 2020-05-29 PROCEDURE — 1101F PT FALLS ASSESS-DOCD LE1/YR: CPT | Performed by: FAMILY MEDICINE

## 2020-05-29 PROCEDURE — 3288F FALL RISK ASSESSMENT DOCD: CPT | Performed by: FAMILY MEDICINE

## 2020-05-29 PROCEDURE — 1160F RVW MEDS BY RX/DR IN RCRD: CPT | Performed by: FAMILY MEDICINE

## 2020-05-29 RX ORDER — ESZOPICLONE 3 MG/1
3 TABLET, FILM COATED ORAL
Qty: 30 TABLET | Refills: 1 | Status: SHIPPED | OUTPATIENT
Start: 2020-05-29 | End: 2020-06-13

## 2020-06-12 ENCOUNTER — TELEPHONE (OUTPATIENT)
Dept: FAMILY MEDICINE CLINIC | Facility: CLINIC | Age: 75
End: 2020-06-12

## 2020-06-13 DIAGNOSIS — G47.9 SLEEP DISTURBANCE: Primary | ICD-10-CM

## 2020-06-13 RX ORDER — ZOLPIDEM TARTRATE 12.5 MG/1
12.5 TABLET, FILM COATED, EXTENDED RELEASE ORAL
Qty: 30 TABLET | Refills: 0 | Status: SHIPPED | OUTPATIENT
Start: 2020-06-13 | End: 2020-06-17

## 2020-06-17 ENCOUNTER — TELEPHONE (OUTPATIENT)
Dept: FAMILY MEDICINE CLINIC | Facility: CLINIC | Age: 75
End: 2020-06-17

## 2020-06-17 DIAGNOSIS — G47.9 SLEEP DISTURBANCE: Primary | ICD-10-CM

## 2020-06-17 RX ORDER — ZOLPIDEM TARTRATE 10 MG/1
10 TABLET ORAL
Qty: 90 TABLET | Refills: 1 | Status: SHIPPED | OUTPATIENT
Start: 2020-06-17 | End: 2020-07-01

## 2020-07-01 DIAGNOSIS — G47.9 SLEEP DISTURBANCE: Primary | ICD-10-CM

## 2020-07-01 RX ORDER — ZOLPIDEM TARTRATE 12.5 MG/1
12.5 TABLET, FILM COATED, EXTENDED RELEASE ORAL
Qty: 90 TABLET | Refills: 0 | Status: SHIPPED | OUTPATIENT
Start: 2020-07-01 | End: 2020-09-10 | Stop reason: SDUPTHER

## 2020-08-10 DIAGNOSIS — K21.9 GASTROESOPHAGEAL REFLUX DISEASE WITHOUT ESOPHAGITIS: ICD-10-CM

## 2020-08-10 DIAGNOSIS — N40.0 HYPERPLASIA OF PROSTATE: ICD-10-CM

## 2020-08-10 DIAGNOSIS — E78.01 FAMILIAL HYPERCHOLESTEROLEMIA: ICD-10-CM

## 2020-08-10 DIAGNOSIS — Z12.5 ENCOUNTER FOR PROSTATE CANCER SCREENING: ICD-10-CM

## 2020-08-10 DIAGNOSIS — I10 ESSENTIAL HYPERTENSION: Primary | ICD-10-CM

## 2020-08-10 DIAGNOSIS — Z13.29 SCREENING FOR HYPOTHYROIDISM: ICD-10-CM

## 2020-08-10 DIAGNOSIS — Z00.00 MEDICARE ANNUAL WELLNESS VISIT, SUBSEQUENT: ICD-10-CM

## 2020-08-14 DIAGNOSIS — J45.20 MILD INTERMITTENT ASTHMA WITHOUT COMPLICATION: ICD-10-CM

## 2020-08-20 LAB
CHOLEST SERPL-MCNC: 192 MG/DL
CHOLEST/HDLC SERPL: 3.9 (CALC)
HDLC SERPL-MCNC: 49 MG/DL
LDLC SERPL CALC-MCNC: 121 MG/DL (CALC)
NONHDLC SERPL-MCNC: 143 MG/DL (CALC)
TRIGL SERPL-MCNC: 113 MG/DL
URATE SERPL-MCNC: 6.7 MG/DL (ref 4–8)

## 2020-08-30 NOTE — PATIENT INSTRUCTIONS
DISCUSSED HEALTH MAINTENENCE ISSUES  BW WILL BE REVIEWED  ENCOURAGED HEALTHY DIET AND EXERCISE  COLONOSCOPY WILL BE ORDERED  RV FOR ANNUAL HEALTH EXAM IN 1 YEAR  RV SOONER IF THERE ARE ANY CONCERNS    Recent Results (from the past 672 hour(s))   Lipid panel    Collection Time: 08/19/20  9:27 AM   Result Value Ref Range    Total Cholesterol 192 <200 mg/dL    HDL 49 > OR = 40 mg/dL    Triglycerides 113 <150 mg/dL    LDL Calculated 121 (H) mg/dL (calc)    Chol HDLC Ratio 3 9 <5 0 (calc)    Non-HDL Cholesterol 143 (H) <130 mg/dL (calc)   Uric acid    Collection Time: 08/19/20  9:27 AM   Result Value Ref Range    Uric Acid 6 7 4 0 - 8 0 mg/dL       Medicare Preventive Visit Patient Instructions  Thank you for completing your Welcome to Medicare Visit or Medicare Annual Wellness Visit today  Your next wellness visit will be due in one year (8/31/2021)  The screening/preventive services that you may require over the next 5-10 years are detailed below  Some tests may not apply to you based off risk factors and/or age  Screening tests ordered at today's visit but not completed yet may show as past due  Also, please note that scanned in results may not display below  Preventive Screenings:  Service Recommendations Previous Testing/Comments   Colorectal Cancer Screening  · Colonoscopy    · Fecal Occult Blood Test (FOBT)/Fecal Immunochemical Test (FIT)  · Fecal DNA/Cologuard Test  · Flexible Sigmoidoscopy Age: 54-65 years old   Colonoscopy: every 10 years (May be performed more frequently if at higher risk)  OR  FOBT/FIT: every 1 year  OR  Cologuard: every 3 years  OR  Sigmoidoscopy: every 5 years  Screening may be recommended earlier than age 48 if at higher risk for colorectal cancer  Also, an individualized decision between you and your healthcare provider will decide whether screening between the ages of 74-80 would be appropriate   Colonoscopy: 01/02/2020  FOBT/FIT: Not on file  Cologuard: Not on file  Sigmoidoscopy: Not on file    Screening Current     Prostate Cancer Screening Individualized decision between patient and health care provider in men between ages of 53-78   Medicare will cover every 12 months beginning on the day after your 50th birthday PSA: 0 6 ng/mL          Hepatitis C Screening Once for adults born between [facility]  More frequently in patients at high risk for Hepatitis C Hep C Antibody: Not on file       Diabetes Screening 1-2 times per year if you're at risk for diabetes or have pre-diabetes Fasting glucose: No results in last 5 years   A1C: No results in last 5 years       Cholesterol Screening Once every 5 years if you don't have a lipid disorder  May order more often based on risk factors  Lipid panel: 08/19/2020    Screening Not Indicated  History Lipid Disorder      Other Preventive Screenings Covered by Medicare:  1  Abdominal Aortic Aneurysm (AAA) Screening: covered once if your at risk  You're considered to be at risk if you have a family history of AAA or a male between the age of 73-68 who smoking at least 100 cigarettes in your lifetime  2  Lung Cancer Screening: covers low dose CT scan once per year if you meet all of the following conditions: (1) Age 50-69; (2) No signs or symptoms of lung cancer; (3) Current smoker or have quit smoking within the last 15 years; (4) You have a tobacco smoking history of at least 30 pack years (packs per day x number of years you smoked); (5) You get a written order from a healthcare provider  3  Glaucoma Screening: covered annually if you're considered high risk: (1) You have diabetes OR (2) Family history of glaucoma OR (3)  aged 48 and older OR (3)  American aged 72 and older  3   Osteoporosis Screening: covered every 2 years if you meet one of the following conditions: (1) Have a vertebral abnormality; (2) On glucocorticoid therapy for more than 3 months; (3) Have primary hyperparathyroidism; (4) On osteoporosis medications and need to assess response to drug therapy  5  HIV Screening: covered annually if you're between the age of 12-76  Also covered annually if you are younger than 13 and older than 72 with risk factors for HIV infection  For pregnant patients, it is covered up to 3 times per pregnancy  Immunizations:  Immunization Recommendations   Influenza Vaccine Annual influenza vaccination during flu season is recommended for all persons aged >= 6 months who do not have contraindications   Pneumococcal Vaccine (Prevnar and Pneumovax)  * Prevnar = PCV13  * Pneumovax = PPSV23 Adults 25-60 years old: 1-3 doses may be recommended based on certain risk factors  Adults 72 years old: Prevnar (PCV13) vaccine recommended followed by Pneumovax (PPSV23) vaccine  If already received PPSV23 since turning 65, then PCV13 recommended at least one year after PPSV23 dose  Hepatitis B Vaccine 3 dose series if at intermediate or high risk (ex: diabetes, end stage renal disease, liver disease)   Tetanus (Td) Vaccine - COST NOT COVERED BY MEDICARE PART B Following completion of primary series, a booster dose should be given every 10 years to maintain immunity against tetanus  Td may also be given as tetanus wound prophylaxis  Tdap Vaccine - COST NOT COVERED BY MEDICARE PART B Recommended at least once for all adults  For pregnant patients, recommended with each pregnancy  Shingles Vaccine (Shingrix) - COST NOT COVERED BY MEDICARE PART B  2 shot series recommended in those aged 48 and above     Health Maintenance Due:      Topic Date Due    Hepatitis C Screening  1945     Immunizations Due:      Topic Date Due    DTaP,Tdap,and Td Vaccines (1 - Tdap) 10/11/1967    Pneumococcal Vaccine: 65+ Years (2 of 2 - PPSV23) 08/25/2017    Influenza Vaccine  07/01/2020     Advance Directives   What are advance directives? Advance directives are legal documents that state your wishes and plans for medical care   These plans are made ahead of time in case you lose your ability to make decisions for yourself  Advance directives can apply to any medical decision, such as the treatments you want, and if you want to donate organs  What are the types of advance directives? There are many types of advance directives, and each state has rules about how to use them  You may choose a combination of any of the following:  · Living will: This is a written record of the treatment you want  You can also choose which treatments you do not want, which to limit, and which to stop at a certain time  This includes surgery, medicine, IV fluid, and tube feedings  · Durable power of  for healthcare [facility]): This is a written record that states who you want to make healthcare choices for you when you are unable to make them for yourself  This person, called a proxy, is usually a family member or a friend  You may choose more than 1 proxy  · Do not resuscitate (DNR) order:  A DNR order is used in case your heart stops beating or you stop breathing  It is a request not to have certain forms of treatment, such as CPR  A DNR order may be included in other types of advance directives  · Medical directive: This covers the care that you want if you are in a coma, near death, or unable to make decisions for yourself  You can list the treatments you want for each condition  Treatment may include pain medicine, surgery, blood transfusions, dialysis, IV or tube feedings, and a ventilator (breathing machine)  · Values history: This document has questions about your views, beliefs, and how you feel and think about life  This information can help others choose the care that you would choose  Why are advance directives important? An advance directive helps you control your care  Although spoken wishes may be used, it is better to have your wishes written down  Spoken wishes can be misunderstood, or not followed  Treatments may be given even if you do not want them   An advance directive may make it easier for your family to make difficult choices about your care  Weight Management   Why it is important to manage your weight:  Being overweight increases your risk of health conditions such as heart disease, high blood pressure, type 2 diabetes, and certain types of cancer  It can also increase your risk for osteoarthritis, sleep apnea, and other respiratory problems  Aim for a slow, steady weight loss  Even a small amount of weight loss can lower your risk of health problems  How to lose weight safely:  A safe and healthy way to lose weight is to eat fewer calories and get regular exercise  You can lose up about 1 pound a week by decreasing the number of calories you eat by 500 calories each day  Healthy meal plan for weight management:  A healthy meal plan includes a variety of foods, contains fewer calories, and helps you stay healthy  A healthy meal plan includes the following:  · Eat whole-grain foods more often  A healthy meal plan should contain fiber  Fiber is the part of grains, fruits, and vegetables that is not broken down by your body  Whole-grain foods are healthy and provide extra fiber in your diet  Some examples of whole-grain foods are whole-wheat breads and pastas, oatmeal, brown rice, and bulgur  · Eat a variety of vegetables every day  Include dark, leafy greens such as spinach, kale, gabriella greens, and mustard greens  Eat yellow and orange vegetables such as carrots, sweet potatoes, and winter squash  · Eat a variety of fruits every day  Choose fresh or canned fruit (canned in its own juice or light syrup) instead of juice  Fruit juice has very little or no fiber  · Eat low-fat dairy foods  Drink fat-free (skim) milk or 1% milk  Eat fat-free yogurt and low-fat cottage cheese  Try low-fat cheeses such as mozzarella and other reduced-fat cheeses  · Choose meat and other protein foods that are low in fat    Choose beans or other legumes such as split peas or lentils  Choose fish, skinless poultry (chicken or turkey), or lean cuts of red meat (beef or pork)  Before you cook meat or poultry, cut off any visible fat  · Use less fat and oil  Try baking foods instead of frying them  Add less fat, such as margarine, sour cream, regular salad dressing and mayonnaise to foods  Eat fewer high-fat foods  Some examples of high-fat foods include french fries, doughnuts, ice cream, and cakes  · Eat fewer sweets  Limit foods and drinks that are high in sugar  This includes candy, cookies, regular soda, and sweetened drinks  Exercise:  Exercise at least 30 minutes per day on most days of the week  Some examples of exercise include walking, biking, dancing, and swimming  You can also fit in more physical activity by taking the stairs instead of the elevator or parking farther away from stores  Ask your healthcare provider about the best exercise plan for you  © Copyright [employer] 2018 Information is for End User's use only and may not be sold, redistributed or otherwise used for commercial purposes   All illustrations and images included in CareNotes® are the copyrighted property of A D A M , Inc  or [address] [place], [place] [address]pe

## 2020-08-31 ENCOUNTER — OFFICE VISIT (OUTPATIENT)
Dept: FAMILY MEDICINE CLINIC | Facility: CLINIC | Age: 75
End: 2020-08-31
Payer: COMMERCIAL

## 2020-08-31 VITALS
RESPIRATION RATE: 16 BRPM | TEMPERATURE: 97.5 F | BODY MASS INDEX: 25.23 KG/M2 | OXYGEN SATURATION: 97 % | SYSTOLIC BLOOD PRESSURE: 116 MMHG | HEART RATE: 72 BPM | HEIGHT: 73 IN | DIASTOLIC BLOOD PRESSURE: 60 MMHG | WEIGHT: 190.4 LBS

## 2020-08-31 DIAGNOSIS — Z00.00 MEDICARE ANNUAL WELLNESS VISIT, SUBSEQUENT: ICD-10-CM

## 2020-08-31 DIAGNOSIS — E78.01 FAMILIAL HYPERCHOLESTEROLEMIA: ICD-10-CM

## 2020-08-31 DIAGNOSIS — M15.9 GENERALIZED OSTEOARTHRITIS OF MULTIPLE SITES: ICD-10-CM

## 2020-08-31 DIAGNOSIS — I10 ESSENTIAL HYPERTENSION: Primary | ICD-10-CM

## 2020-08-31 DIAGNOSIS — Z12.11 COLON CANCER SCREENING: ICD-10-CM

## 2020-08-31 DIAGNOSIS — J45.20 MILD INTERMITTENT ASTHMA WITHOUT COMPLICATION: ICD-10-CM

## 2020-08-31 DIAGNOSIS — K21.9 GASTROESOPHAGEAL REFLUX DISEASE WITHOUT ESOPHAGITIS: ICD-10-CM

## 2020-08-31 DIAGNOSIS — M10.9 GOUTY ARTHROPATHY: ICD-10-CM

## 2020-08-31 DIAGNOSIS — R82.998 LEUKOCYTES IN URINE: ICD-10-CM

## 2020-08-31 DIAGNOSIS — Z12.5 ENCOUNTER FOR PROSTATE CANCER SCREENING: ICD-10-CM

## 2020-08-31 LAB
SL AMB  POCT GLUCOSE, UA: 0
SL AMB LEUKOCYTE ESTERASE,UA: 25
SL AMB POCT BILIRUBIN,UA: 1
SL AMB POCT BLOOD,UA: 0
SL AMB POCT CLARITY,UA: CLEAR
SL AMB POCT COLOR,UA: ABNORMAL
SL AMB POCT FECES OCC BLD: NORMAL
SL AMB POCT KETONES,UA: 15
SL AMB POCT NITRITE,UA: 0
SL AMB POCT PH,UA: 7
SL AMB POCT SPECIFIC GRAVITY,UA: 1.01
SL AMB POCT URINE PROTEIN: ABNORMAL
SL AMB POCT UROBILINOGEN: 1

## 2020-08-31 PROCEDURE — 3725F SCREEN DEPRESSION PERFORMED: CPT | Performed by: FAMILY MEDICINE

## 2020-08-31 PROCEDURE — 1036F TOBACCO NON-USER: CPT | Performed by: FAMILY MEDICINE

## 2020-08-31 PROCEDURE — 81003 URINALYSIS AUTO W/O SCOPE: CPT | Performed by: FAMILY MEDICINE

## 2020-08-31 PROCEDURE — 93000 ELECTROCARDIOGRAM COMPLETE: CPT | Performed by: FAMILY MEDICINE

## 2020-08-31 PROCEDURE — 1170F FXNL STATUS ASSESSED: CPT | Performed by: FAMILY MEDICINE

## 2020-08-31 PROCEDURE — 3078F DIAST BP <80 MM HG: CPT | Performed by: FAMILY MEDICINE

## 2020-08-31 PROCEDURE — G0439 PPPS, SUBSEQ VISIT: HCPCS | Performed by: FAMILY MEDICINE

## 2020-08-31 PROCEDURE — 4040F PNEUMOC VAC/ADMIN/RCVD: CPT | Performed by: FAMILY MEDICINE

## 2020-08-31 PROCEDURE — 99215 OFFICE O/P EST HI 40 MIN: CPT | Performed by: FAMILY MEDICINE

## 2020-08-31 PROCEDURE — 82270 OCCULT BLOOD FECES: CPT | Performed by: FAMILY MEDICINE

## 2020-08-31 PROCEDURE — 3008F BODY MASS INDEX DOCD: CPT | Performed by: FAMILY MEDICINE

## 2020-08-31 PROCEDURE — 1125F AMNT PAIN NOTED PAIN PRSNT: CPT | Performed by: FAMILY MEDICINE

## 2020-08-31 PROCEDURE — 1160F RVW MEDS BY RX/DR IN RCRD: CPT | Performed by: FAMILY MEDICINE

## 2020-08-31 PROCEDURE — 3074F SYST BP LT 130 MM HG: CPT | Performed by: FAMILY MEDICINE

## 2020-08-31 NOTE — PROGRESS NOTES
Assessment/Plan:    Hyperlipidemia  WATCHING CHOLESTEROL INTAKE  COMPLIANT WITH MEDICATION  NO CONCERNS    - CONTINUE TO MONITOR DIETARY CHOL INTAKE  - CONTINUE CURRENT MEDICATION  - ENCOURAGED PHYSICAL ACTIVITY        Generalized osteoarthritis of multiple sites  STABLE  DENIES ANY JOINT SWELLING OR REDNESS  JOINT STIFFNESS PRESENT  PAIN MANAGEMENT ADEQUATE    - CONTINUE CURRENT MANAGEMENT  - MEDICATION AS DIRECTED  - CALL / RETURN IF SYMPTOMS WORSEN      Hypertension  STABLE  DENIES ANY CP, SOB, PALPITATIONS, OR HEADACHE  NOTES NO WATER RETENTION  COMPLIANT WITH MEDICATION  NO CONCERNS    - CONTINUE CURRENT TREATMENT PLAN  - MONITOR DIETARY SODIUM INTAKE  - ENCOURAGE PHYSICAL ACTIVITY  - RV 3 MONTHS      GERD (gastroesophageal reflux disease)  STABLE  DENIES ANY CP, INDIGESTION, OR COUGH  NOTES NO MELENA OR HEMATOCHEZIA  NO HEMATEMESIS  COMPLIANT WITH MEDICATION  NO CONCERNS    - CONTINUE CURRENT TREATMENT PLAN  - MEDICATION AS PRESCRIBED  - AVOID CAFFEINE, ALCOHOL OR SMOKING      Mild intermittent asthma without complication  STABLE       Diagnoses and all orders for this visit:    Essential hypertension  -     POCT ECG  -     POCT urine dip auto non-scope    Gastroesophageal reflux disease without esophagitis  -     POCT urine dip auto non-scope    Mild intermittent asthma without complication    Generalized osteoarthritis of multiple sites    Gouty arthropathy    Familial hypercholesterolemia    Encounter for prostate cancer screening    Colon cancer screening  -     POCT hemoccult screening    Medicare annual wellness visit, subsequent  -     POCT urine dip auto non-scope          Patient Instructions     DISCUSSED HEALTH MAINTENENCE ISSUES  BW WILL BE REVIEWED  ENCOURAGED HEALTHY DIET AND EXERCISE  COLONOSCOPY WILL BE ORDERED  RV FOR ANNUAL HEALTH EXAM IN 1 YEAR  RV SOONER IF THERE ARE ANY CONCERNS    Recent Results (from the past 672 hour(s))   Lipid panel    Collection Time: 08/19/20  9:27 AM   Result Value Ref Range    Total Cholesterol 192 <200 mg/dL    HDL 49 > OR = 40 mg/dL    Triglycerides 113 <150 mg/dL    LDL Calculated 121 (H) mg/dL (calc)    Chol HDLC Ratio 3 9 <5 0 (calc)    Non-HDL Cholesterol 143 (H) <130 mg/dL (calc)   Uric acid    Collection Time: 08/19/20  9:27 AM   Result Value Ref Range    Uric Acid 6 7 4 0 - 8 0 mg/dL       Medicare Preventive Visit Patient Instructions  Thank you for completing your Welcome to Medicare Visit or Medicare Annual Wellness Visit today  Your next wellness visit will be due in one year (8/31/2021)  The screening/preventive services that you may require over the next 5-10 years are detailed below  Some tests may not apply to you based off risk factors and/or age  Screening tests ordered at today's visit but not completed yet may show as past due  Also, please note that scanned in results may not display below  Preventive Screenings:  Service Recommendations Previous Testing/Comments   Colorectal Cancer Screening  · Colonoscopy    · Fecal Occult Blood Test (FOBT)/Fecal Immunochemical Test (FIT)  · Fecal DNA/Cologuard Test  · Flexible Sigmoidoscopy Age: 54-65 years old   Colonoscopy: every 10 years (May be performed more frequently if at higher risk)  OR  FOBT/FIT: every 1 year  OR  Cologuard: every 3 years  OR  Sigmoidoscopy: every 5 years  Screening may be recommended earlier than age 48 if at higher risk for colorectal cancer  Also, an individualized decision between you and your healthcare provider will decide whether screening between the ages of 74-80 would be appropriate   Colonoscopy: 01/02/2020  FOBT/FIT: Not on file  Cologuard: Not on file  Sigmoidoscopy: Not on file    Screening Current     Prostate Cancer Screening Individualized decision between patient and health care provider in men between ages of 53-78   Medicare will cover every 12 months beginning on the day after your 50th birthday PSA: 0 6 ng/mL          Hepatitis C Screening Once for adults born between [facility]  More frequently in patients at high risk for Hepatitis C Hep C Antibody: Not on file       Diabetes Screening 1-2 times per year if you're at risk for diabetes or have pre-diabetes Fasting glucose: No results in last 5 years   A1C: No results in last 5 years       Cholesterol Screening Once every 5 years if you don't have a lipid disorder  May order more often based on risk factors  Lipid panel: 08/19/2020    Screening Not Indicated  History Lipid Disorder      Other Preventive Screenings Covered by Medicare:  1  Abdominal Aortic Aneurysm (AAA) Screening: covered once if your at risk  You're considered to be at risk if you have a family history of AAA or a male between the age of 73-68 who smoking at least 100 cigarettes in your lifetime  2  Lung Cancer Screening: covers low dose CT scan once per year if you meet all of the following conditions: (1) Age 50-69; (2) No signs or symptoms of lung cancer; (3) Current smoker or have quit smoking within the last 15 years; (4) You have a tobacco smoking history of at least 30 pack years (packs per day x number of years you smoked); (5) You get a written order from a healthcare provider  3  Glaucoma Screening: covered annually if you're considered high risk: (1) You have diabetes OR (2) Family history of glaucoma OR (3)  aged 48 and older OR (3)  American aged 72 and older  3  Osteoporosis Screening: covered every 2 years if you meet one of the following conditions: (1) Have a vertebral abnormality; (2) On glucocorticoid therapy for more than 3 months; (3) Have primary hyperparathyroidism; (4) On osteoporosis medications and need to assess response to drug therapy  5  HIV Screening: covered annually if you're between the age of 12-76  Also covered annually if you are younger than 13 and older than 72 with risk factors for HIV infection   For pregnant patients, it is covered up to 3 times per pregnancy  Immunizations:  Immunization Recommendations   Influenza Vaccine Annual influenza vaccination during flu season is recommended for all persons aged >= 6 months who do not have contraindications   Pneumococcal Vaccine (Prevnar and Pneumovax)  * Prevnar = PCV13  * Pneumovax = PPSV23 Adults 25-60 years old: 1-3 doses may be recommended based on certain risk factors  Adults 72 years old: Prevnar (PCV13) vaccine recommended followed by Pneumovax (PPSV23) vaccine  If already received PPSV23 since turning 65, then PCV13 recommended at least one year after PPSV23 dose  Hepatitis B Vaccine 3 dose series if at intermediate or high risk (ex: diabetes, end stage renal disease, liver disease)   Tetanus (Td) Vaccine - COST NOT COVERED BY MEDICARE PART B Following completion of primary series, a booster dose should be given every 10 years to maintain immunity against tetanus  Td may also be given as tetanus wound prophylaxis  Tdap Vaccine - COST NOT COVERED BY MEDICARE PART B Recommended at least once for all adults  For pregnant patients, recommended with each pregnancy  Shingles Vaccine (Shingrix) - COST NOT COVERED BY MEDICARE PART B  2 shot series recommended in those aged 48 and above     Health Maintenance Due:      Topic Date Due    Hepatitis C Screening  1945     Immunizations Due:      Topic Date Due    DTaP,Tdap,and Td Vaccines (1 - Tdap) 10/11/1967    Pneumococcal Vaccine: 65+ Years (2 of 2 - PPSV23) 08/25/2017    Influenza Vaccine  07/01/2020     Advance Directives   What are advance directives? Advance directives are legal documents that state your wishes and plans for medical care  These plans are made ahead of time in case you lose your ability to make decisions for yourself  Advance directives can apply to any medical decision, such as the treatments you want, and if you want to donate organs  What are the types of advance directives?   There are many types of advance directives, and each state has rules about how to use them  You may choose a combination of any of the following:  · Living will: This is a written record of the treatment you want  You can also choose which treatments you do not want, which to limit, and which to stop at a certain time  This includes surgery, medicine, IV fluid, and tube feedings  · Durable power of  for healthcare [place] [facility]): This is a written record that states who you want to make healthcare choices for you when you are unable to make them for yourself  This person, called a proxy, is usually a family member or a friend  You may choose more than 1 proxy  · Do not resuscitate (DNR) order:  A DNR order is used in case your heart stops beating or you stop breathing  It is a request not to have certain forms of treatment, such as CPR  A DNR order may be included in other types of advance directives  · Medical directive: This covers the care that you want if you are in a coma, near death, or unable to make decisions for yourself  You can list the treatments you want for each condition  Treatment may include pain medicine, surgery, blood transfusions, dialysis, IV or tube feedings, and a ventilator (breathing machine)  · Values history: This document has questions about your views, beliefs, and how you feel and think about life  This information can help others choose the care that you would choose  Why are advance directives important? An advance directive helps you control your care  Although spoken wishes may be used, it is better to have your wishes written down  Spoken wishes can be misunderstood, or not followed  Treatments may be given even if you do not want them  An advance directive may make it easier for your family to make difficult choices about your care     Weight Management   Why it is important to manage your weight:  Being overweight increases your risk of health conditions such as heart disease, high blood pressure, type 2 diabetes, and certain types of cancer  It can also increase your risk for osteoarthritis, sleep apnea, and other respiratory problems  Aim for a slow, steady weight loss  Even a small amount of weight loss can lower your risk of health problems  How to lose weight safely:  A safe and healthy way to lose weight is to eat fewer calories and get regular exercise  You can lose up about 1 pound a week by decreasing the number of calories you eat by 500 calories each day  Healthy meal plan for weight management:  A healthy meal plan includes a variety of foods, contains fewer calories, and helps you stay healthy  A healthy meal plan includes the following:  · Eat whole-grain foods more often  A healthy meal plan should contain fiber  Fiber is the part of grains, fruits, and vegetables that is not broken down by your body  Whole-grain foods are healthy and provide extra fiber in your diet  Some examples of whole-grain foods are whole-wheat breads and pastas, oatmeal, brown rice, and bulgur  · Eat a variety of vegetables every day  Include dark, leafy greens such as spinach, kale, gabriella greens, and mustard greens  Eat yellow and orange vegetables such as carrots, sweet potatoes, and winter squash  · Eat a variety of fruits every day  Choose fresh or canned fruit (canned in its own juice or light syrup) instead of juice  Fruit juice has very little or no fiber  · Eat low-fat dairy foods  Drink fat-free (skim) milk or 1% milk  Eat fat-free yogurt and low-fat cottage cheese  Try low-fat cheeses such as mozzarella and other reduced-fat cheeses  · Choose meat and other protein foods that are low in fat  Choose beans or other legumes such as split peas or lentils  Choose fish, skinless poultry (chicken or turkey), or lean cuts of red meat (beef or pork)  Before you cook meat or poultry, cut off any visible fat  · Use less fat and oil  Try baking foods instead of frying them   Add less fat, such as margarine, sour cream, regular salad dressing and mayonnaise to foods  Eat fewer high-fat foods  Some examples of high-fat foods include french fries, doughnuts, ice cream, and cakes  · Eat fewer sweets  Limit foods and drinks that are high in sugar  This includes candy, cookies, regular soda, and sweetened drinks  Exercise:  Exercise at least 30 minutes per day on most days of the week  Some examples of exercise include walking, biking, dancing, and swimming  You can also fit in more physical activity by taking the stairs instead of the elevator or parking farther away from stores  Ask your healthcare provider about the best exercise plan for you  © Copyright [employer] 2018 Information is for End User's use only and may not be sold, redistributed or otherwise used for commercial purposes  All illustrations and images included in CareNotes® are the copyrighted property of [employer] D [employer] M , Inc  or [employer]      No follow-ups on file  Subjective:      Patient ID: Karey Fabry is a 69 y o  male  Chief Complaint   Patient presents with   [facility] Medicare Wellness Visit       PATIENT RETURNS FOR ANNUAL WELLNESS EXAM AND ANNUAL EVALUATION OF PATIENT'S MEDICAL ISSUES    INDIVIDUAL MEDICAL ISSUES WITH THEIR CURRENT STATUS, ASSESSMENT AND PLANS ARE LISTED ABOVE          The following portions of the patient's history were reviewed and updated as appropriate: allergies, current medications, past family history, past medical history, past social history, past surgical history and problem list     Review of Systems   Constitutional: Negative for chills, fatigue and fever  HENT: Negative for congestion, ear discharge, ear pain, mouth sores, postnasal drip, sore throat and trouble swallowing  Eyes: Negative for pain, discharge and visual disturbance  Respiratory: Negative for cough, shortness of breath and wheezing  Cardiovascular: Negative for chest pain, palpitations and leg swelling     Gastrointestinal: Negative for abdominal distention, abdominal pain, blood in stool, diarrhea and nausea  Endocrine: Negative for polydipsia, polyphagia and polyuria  Genitourinary: Negative for dysuria, frequency, hematuria and urgency  Musculoskeletal: Positive for arthralgias  Negative for gait problem and joint swelling  Skin: Negative for pallor and rash  Neurological: Negative for dizziness, syncope, speech difficulty, weakness, light-headedness, numbness and headaches  Hematological: Negative for adenopathy  Psychiatric/Behavioral: Negative for behavioral problems, confusion and sleep disturbance  The patient is not nervous/anxious  Current Outpatient Medications   Medication Sig Dispense Refill    colchicine (COLCRYS) 0 6 mg tablet Take 1 tablet (0 6 mg total) by mouth 2 (two) times a day 60 tablet 3    desloratadine (CLARINEX) 5 MG tablet Take 1 tablet (5 mg total) by mouth daily 30 tablet 3    mometasone (,ASMANEX TWISTHALER,) 110 MCG/INH AEPB inhaler Inhale 2 puffs every evening Rinse mouth after use   3 Inhaler 0    nabumetone (RELAFEN) 750 mg tablet Take 1 tablet (750 mg total) by mouth 2 (two) times a day 180 tablet 1    tamsulosin (FLOMAX) 0 4 mg Take 2 capsules (0 8 mg total) by mouth daily at bedtime 180 capsule 1    zolpidem (AMBIEN CR) 12 5 MG CR tablet Take 1 tablet (12 5 mg total) by mouth daily at bedtime as needed for sleep 90 tablet 0    finasteride (PROSCAR) 5 mg tablet Take 1 tablet (5 mg total) by mouth daily (Patient not taking: Reported on 8/31/2020) 90 tablet 1    Mirabegron ER (MYRBETRIQ) 50 MG TB24 Take 1 tablet (50 mg total) by mouth daily (Patient not taking: Reported on 8/31/2020) 90 tablet 1    Suvorexant (Belsomra) 20 MG TABS Take 1 tablet (20 mg total) by mouth daily at bedtime (Patient not taking: Reported on 8/31/2020) 90 tablet 1    tamsulosin (FLOMAX) 0 4 mg TAKE 2 CAPSULES(0 8 MG) BY MOUTH DAILY AT BEDTIME (Patient not taking: Reported on 5/29/2020) 180 capsule 3 No current facility-administered medications for this visit  Objective:    /60   Pulse 72   Temp 97 5 °F (36 4 °C) (Temporal)   Resp 16   Ht 6' 1" (1 854 m)   Wt 86 4 kg (190 lb 6 4 oz)   SpO2 97%   BMI 25 12 kg/m²        Physical Exam  Constitutional:       Appearance: He is well-developed  HENT:      Head: Normocephalic and atraumatic  Right Ear: Tympanic membrane, ear canal and external ear normal       Left Ear: Tympanic membrane, ear canal and external ear normal       Nose: Nose normal       Mouth/Throat:      Mouth: Mucous membranes are moist       Pharynx: Oropharynx is clear  Eyes:      General:         Right eye: No discharge  Left eye: No discharge  Conjunctiva/sclera: Conjunctivae normal       Pupils: Pupils are equal, round, and reactive to light  Neck:      Musculoskeletal: Neck supple  Thyroid: No thyromegaly  Vascular: No JVD  Cardiovascular:      Rate and Rhythm: Normal rate and regular rhythm  Heart sounds: Normal heart sounds  No murmur  Pulmonary:      Effort: Pulmonary effort is normal       Breath sounds: Normal breath sounds  No wheezing or rales  Abdominal:      General: Bowel sounds are normal       Palpations: Abdomen is soft  There is no mass  Tenderness: There is no abdominal tenderness  There is no guarding or rebound  Genitourinary:     Penis: Normal        Prostate: Normal       Rectum: Normal  Guaiac result negative  Musculoskeletal: Normal range of motion  General: No tenderness or deformity  Lymphadenopathy:      Cervical: No cervical adenopathy  Skin:     General: Skin is warm and dry  Findings: No erythema or rash  Neurological:      Mental Status: He is alert and oriented to person, place, and time  Cranial Nerves: No cranial nerve deficit  Motor: No abnormal muscle tone  Coordination: Coordination normal       Deep Tendon Reflexes: Reflexes are normal and symmetric  Psychiatric:         Behavior: Behavior normal          Thought Content:  Thought content normal          Judgment: Judgment normal                 Cristine Jones MD

## 2020-08-31 NOTE — PROGRESS NOTES
Assessment and Plan:     Problem List Items Addressed This Visit        Digestive    GERD (gastroesophageal reflux disease)     STABLE  DENIES ANY CP, INDIGESTION, OR COUGH  NOTES NO MELENA OR HEMATOCHEZIA  NO HEMATEMESIS  COMPLIANT WITH MEDICATION  NO CONCERNS    - CONTINUE CURRENT TREATMENT PLAN  - MEDICATION AS PRESCRIBED  - AVOID CAFFEINE, ALCOHOL OR SMOKING           Relevant Orders    POCT urine dip auto non-scope (Completed)       Respiratory    Mild intermittent asthma without complication     STABLE            Cardiovascular and Mediastinum    Hypertension - Primary     STABLE  DENIES ANY CP, SOB, PALPITATIONS, OR HEADACHE  NOTES NO WATER RETENTION  COMPLIANT WITH MEDICATION  NO CONCERNS    - CONTINUE CURRENT TREATMENT PLAN  - MONITOR DIETARY SODIUM INTAKE  - ENCOURAGE PHYSICAL ACTIVITY  - RV 3 MONTHS           Relevant Orders    POCT ECG (Completed)    POCT urine dip auto non-scope (Completed)       Musculoskeletal and Integument    Generalized osteoarthritis of multiple sites     STABLE  DENIES ANY JOINT SWELLING OR REDNESS  JOINT STIFFNESS PRESENT  PAIN MANAGEMENT ADEQUATE    - CONTINUE CURRENT MANAGEMENT  - MEDICATION AS DIRECTED  - CALL / RETURN IF SYMPTOMS WORSEN           Gouty arthropathy       Other    Hyperlipidemia     WATCHING CHOLESTEROL INTAKE  COMPLIANT WITH MEDICATION  NO CONCERNS    - CONTINUE TO MONITOR DIETARY CHOL INTAKE  - CONTINUE CURRENT MEDICATION  - ENCOURAGED PHYSICAL ACTIVITY             Encounter for prostate cancer screening    Colon cancer screening    Relevant Orders    POCT hemoccult screening (Completed)    Medicare annual wellness visit, subsequent    Relevant Orders    POCT urine dip auto non-scope (Completed)           Preventive health issues were discussed with patient, and age appropriate screening tests were ordered as noted in patient's After Visit Summary    Personalized health advice and appropriate referrals for health education or preventive services given if needed, as noted in patient's After Visit Summary       History of Present Illness:     Patient presents for Medicare Annual Wellness visit    Patient Care Team:  Jean Galindo MD as PCP - General (Family Medicine)  Brenda Hutchinson MD (Urology)     Problem List:     Patient Active Problem List   Diagnosis    Mixed emotional features as adjustment reaction    Disturbance of sleep    Generalized osteoarthritis of multiple sites    GERD (gastroesophageal reflux disease)    Gouty arthropathy    Hyperlipidemia    Hyperplasia of prostate    Hypertension    Mild intermittent asthma without complication    Sensorineural hearing loss    Encounter for prostate cancer screening    Colon cancer screening    Screening for hypothyroidism    Medicare annual wellness visit, subsequent      Past Medical and Surgical History:     Past Medical History:   Diagnosis Date    Cataract     resolved: 7/20/2016    Glaucoma     Sciatica, right side     last assessed: 7/20/2016     Past Surgical History:   Procedure Laterality Date    TONSILLECTOMY        Family History:     Family History   Problem Relation Age of Onset    Coronary artery disease Mother     Cancer Mother     Coronary artery disease Father     Substance Abuse Daughter     Mental illness Neg Hx       Social History:     E-Cigarette/Vaping    E-Cigarette Use Never User      E-Cigarette/Vaping Substances    Nicotine No     THC No     CBD No     Flavoring No     Other No     Unknown No      Social History     Socioeconomic History    Marital status: /Civil Union     Spouse name: None    Number of children: None    Years of education: None    Highest education level: None   Occupational History    None   Social Needs    Financial resource strain: None    Food insecurity     Worry: None     Inability: None    Transportation needs     Medical: None     Non-medical: None   Tobacco Use    Smoking status: Former Smoker     Last attempt to quit: 1979     Years since quittin 6    Smokeless tobacco: Never Used   Substance and Sexual Activity    Alcohol use: No    Drug use: No    Sexual activity: None   Lifestyle    Physical activity     Days per week: None     Minutes per session: None    Stress: None   Relationships    Social connections     Talks on phone: None     Gets together: None     Attends Voodoo service: None     Active member of club or organization: None     Attends meetings of clubs or organizations: None     Relationship status: None    Intimate partner violence     Fear of current or ex partner: None     Emotionally abused: None     Physically abused: None     Forced sexual activity: None   Other Topics Concern    None   Social History Narrative    Denied: history of active advance directive    Daily caffeine consumption, 1 serving a day    Dental care, regularly    No advance directives    Primary spoken language English (as per allscripts)      Medications and Allergies:     Current Outpatient Medications   Medication Sig Dispense Refill    colchicine (COLCRYS) 0 6 mg tablet Take 1 tablet (0 6 mg total) by mouth 2 (two) times a day 60 tablet 3    desloratadine (CLARINEX) 5 MG tablet Take 1 tablet (5 mg total) by mouth daily 30 tablet 3    mometasone (,ASMANEX TWISTHALER,) 110 MCG/INH AEPB inhaler Inhale 2 puffs every evening Rinse mouth after use   3 Inhaler 0    nabumetone (RELAFEN) 750 mg tablet Take 1 tablet (750 mg total) by mouth 2 (two) times a day 180 tablet 1    tamsulosin (FLOMAX) 0 4 mg Take 2 capsules (0 8 mg total) by mouth daily at bedtime 180 capsule 1    zolpidem (AMBIEN CR) 12 5 MG CR tablet Take 1 tablet (12 5 mg total) by mouth daily at bedtime as needed for sleep 90 tablet 0    finasteride (PROSCAR) 5 mg tablet Take 1 tablet (5 mg total) by mouth daily (Patient not taking: Reported on 2020) 90 tablet 1    Mirabegron ER (MYRBETRIQ) 50 MG TB24 Take 1 tablet (50 mg total) by mouth daily (Patient not taking: Reported on 8/31/2020) 90 tablet 1    Suvorexant (Belsomra) 20 MG TABS Take 1 tablet (20 mg total) by mouth daily at bedtime (Patient not taking: Reported on 8/31/2020) 90 tablet 1    tamsulosin (FLOMAX) 0 4 mg TAKE 2 CAPSULES(0 8 MG) BY MOUTH DAILY AT BEDTIME (Patient not taking: Reported on 5/29/2020) 180 capsule 3     No current facility-administered medications for this visit  No Known Allergies   Immunizations:     Immunization History   Administered Date(s) Administered    DT (pediatric) 06/01/1999    INFLUENZA 11/01/2019    Influenza Split High Dose Preservative Free IM 10/01/2014, 08/25/2016, 09/01/2017, 10/30/2019    Influenza TIV (IM) 1945, 10/13/2010, 09/12/2013    Influenza, high dose seasonal 0 7 mL 09/07/2018    Pneumococcal Conjugate 13-Valent 08/25/2016    Td (adult), adsorbed 05/20/2010      Health Maintenance:         Topic Date Due    Hepatitis C Screening  1945         Topic Date Due    DTaP,Tdap,and Td Vaccines (1 - Tdap) 10/11/1967    Pneumococcal Vaccine: 65+ Years (2 of 2 - PPSV23) 08/25/2017    Influenza Vaccine  07/01/2020      Medicare Health Risk Assessment:     /60   Pulse 72   Temp 97 5 °F (36 4 °C) (Temporal)   Resp 16   Ht 6' 1" (1 854 m)   Wt 86 4 kg (190 lb 6 4 oz)   SpO2 97%   BMI 25 12 kg/m²      MARCY is here for his Subsequent Wellness visit  Health Risk Assessment:   Patient rates overall health as good  Patient feels that their physical health rating is same  Eyesight was rated as same  Hearing was rated as slightly worse  Patient feels that their emotional and mental health rating is slightly better  Pain experienced in the last 7 days has been some  Patient's pain rating has been 2/10  Patient states that he has experienced no weight loss or gain in last 6 months  Depression Screening:   PHQ-2 Score: 0      Fall Risk Screening:    In the past year, patient has experienced: no history of falling in past year Home Safety:  Patient does not have trouble with stairs inside or outside of their home  Patient has working smoke alarms and has working carbon monoxide detector  Home safety hazards include: none  Nutrition:   Current diet is Low Carb and Limited junk food  Medications:   Patient is currently taking over-the-counter supplements  OTC medications include: see medication list  Patient is able to manage medications  Activities of Daily Living (ADLs)/Instrumental Activities of Daily Living (IADLs):   Walk and transfer into and out of bed and chair?: Yes  Dress and groom yourself?: Yes    Bathe or shower yourself?: Yes    Feed yourself? Yes  Do your laundry/housekeeping?: Yes  Manage your money, pay your bills and track your expenses?: Yes  Make your own meals?: Yes    Do your own shopping?: Yes    Previous Hospitalizations:   Any hospitalizations or ED visits within the last 12 months?: No      Advance Care Planning:     Durable POA for healthcare:  Yes    Advanced directive: No    Five wishes given: Yes    Provider agrees with end of life decisions: Yes      PREVENTIVE SCREENINGS      Cardiovascular Screening:    General: Screening Not Indicated and History Lipid Disorder      Diabetes Screening:     General: Screening Current      Colorectal Cancer Screening:     General: Screening Current      Prostate Cancer Screening:    General: Screening Current      Abdominal Aortic Aneurysm (AAA) Screening:    Risk factors include: age between 73-67 yo and tobacco use        Lung Cancer Screening:     General: Screening Not Indicated      Bakrai Medina MD

## 2020-08-31 NOTE — LETTER
Leia Doe    Recent Results (from the past 672 hour(s))   Lipid panel    Collection Time: 08/19/20  9:27 AM   Result Value Ref Range    Total Cholesterol 192 <200 mg/dL    HDL 49 > OR = 40 mg/dL    Triglycerides 113 <150 mg/dL    LDL Calculated 121 (H) mg/dL (calc)    Chol HDLC Ratio 3 9 <5 0 (calc)    Non-HDL Cholesterol 143 (H) <130 mg/dL (calc)   Uric acid    Collection Time: 08/19/20  9:27 AM   Result Value Ref Range    Uric Acid 6 7 4 0 - 8 0 mg/dL

## 2020-09-10 ENCOUNTER — OFFICE VISIT (OUTPATIENT)
Dept: FAMILY MEDICINE CLINIC | Facility: CLINIC | Age: 75
End: 2020-09-10
Payer: COMMERCIAL

## 2020-09-10 VITALS
RESPIRATION RATE: 18 BRPM | BODY MASS INDEX: 24.64 KG/M2 | OXYGEN SATURATION: 98 % | WEIGHT: 192 LBS | SYSTOLIC BLOOD PRESSURE: 140 MMHG | DIASTOLIC BLOOD PRESSURE: 70 MMHG | HEART RATE: 65 BPM | TEMPERATURE: 97.9 F | HEIGHT: 74 IN

## 2020-09-10 DIAGNOSIS — Z23 NEED FOR INFLUENZA VACCINATION: ICD-10-CM

## 2020-09-10 DIAGNOSIS — G47.9 SLEEP DISTURBANCE: ICD-10-CM

## 2020-09-10 DIAGNOSIS — L51.9 ERYTHEMA MULTIFORME: Primary | ICD-10-CM

## 2020-09-10 DIAGNOSIS — N40.0 PROSTATIC HYPERTROPHY: ICD-10-CM

## 2020-09-10 PROCEDURE — 96372 THER/PROPH/DIAG INJ SC/IM: CPT | Performed by: FAMILY MEDICINE

## 2020-09-10 PROCEDURE — G0008 ADMIN INFLUENZA VIRUS VAC: HCPCS

## 2020-09-10 PROCEDURE — 3078F DIAST BP <80 MM HG: CPT | Performed by: FAMILY MEDICINE

## 2020-09-10 PROCEDURE — 99213 OFFICE O/P EST LOW 20 MIN: CPT | Performed by: FAMILY MEDICINE

## 2020-09-10 PROCEDURE — 1160F RVW MEDS BY RX/DR IN RCRD: CPT | Performed by: FAMILY MEDICINE

## 2020-09-10 PROCEDURE — 90662 IIV NO PRSV INCREASED AG IM: CPT

## 2020-09-10 PROCEDURE — 1036F TOBACCO NON-USER: CPT | Performed by: FAMILY MEDICINE

## 2020-09-10 PROCEDURE — 3077F SYST BP >= 140 MM HG: CPT | Performed by: FAMILY MEDICINE

## 2020-09-10 RX ORDER — PREDNISONE 20 MG/1
TABLET ORAL
Qty: 14 TABLET | Refills: 0 | Status: SHIPPED | OUTPATIENT
Start: 2020-09-10 | End: 2021-07-15

## 2020-09-10 RX ORDER — TAMSULOSIN HYDROCHLORIDE 0.4 MG/1
0.8 CAPSULE ORAL
Qty: 180 CAPSULE | Refills: 1 | Status: SHIPPED | OUTPATIENT
Start: 2020-09-10 | End: 2021-02-19 | Stop reason: SDUPTHER

## 2020-09-10 RX ORDER — ZOLPIDEM TARTRATE 12.5 MG/1
12.5 TABLET, FILM COATED, EXTENDED RELEASE ORAL
Qty: 90 TABLET | Refills: 0 | Status: SHIPPED | OUTPATIENT
Start: 2020-09-10 | End: 2020-12-15 | Stop reason: SDUPTHER

## 2020-09-10 RX ORDER — DEXAMETHASONE SODIUM PHOSPHATE 4 MG/ML
4 INJECTION, SOLUTION INTRA-ARTICULAR; INTRALESIONAL; INTRAMUSCULAR; INTRAVENOUS; SOFT TISSUE ONCE
Status: COMPLETED | OUTPATIENT
Start: 2020-09-10 | End: 2020-09-10

## 2020-09-10 RX ADMIN — DEXAMETHASONE SODIUM PHOSPHATE 120 MG: 4 INJECTION, SOLUTION INTRA-ARTICULAR; INTRALESIONAL; INTRAMUSCULAR; INTRAVENOUS; SOFT TISSUE at 14:19

## 2020-09-10 NOTE — PROGRESS NOTES
Assessment/Plan:    No problem-specific Assessment & Plan notes found for this encounter  Diagnoses and all orders for this visit:    Erythema multiforme  -     dexamethasone (DECADRON) injection 4 mg  -     predniSONE 20 mg tablet; 2 PO QD X 4 DAYS, THEN 1 PO QD X 4 DAYS, THEN 1/2 PO QD X 4 DAYS    Prostatic hypertrophy  -     tamsulosin (FLOMAX) 0 4 mg; Take 2 capsules (0 8 mg total) by mouth daily at bedtime    Sleep disturbance  -     zolpidem (AMBIEN CR) 12 5 MG CR tablet; Take 1 tablet (12 5 mg total) by mouth daily at bedtime as needed for sleep          Patient Instructions   COOL COMPRESSES  BENADRYL IF NEEDED  MEDICATIONS AS DIRECTED    CALL IF SYMPTOMS PERSIST OR WORSEN      Return if symptoms worsen or fail to improve, for Next scheduled follow up  Subjective:      Patient ID: Daniel Trinidad is a 69 y o  male  Chief Complaint   Patient presents with    rash on both legs and arms       Rash   This is a new problem  The current episode started in the past 7 days  The affected locations include the left arm, right arm, right lower leg and left lower leg  The rash is characterized by redness and itchiness  He was exposed to nothing  Associated symptoms include eye pain  Pertinent negatives include no anorexia, congestion, cough, diarrhea, facial edema, fatigue, fever, joint pain, nail changes, rhinorrhea, shortness of breath, sore throat or vomiting  Past treatments include anti-itch cream  The treatment provided no relief  His past medical history is significant for allergies and asthma  The following portions of the patient's history were reviewed and updated as appropriate: allergies, current medications, past family history, past medical history, past social history, past surgical history and problem list     Review of Systems   Constitutional: Negative for chills, fatigue and fever     HENT: Negative for congestion, ear discharge, ear pain, mouth sores, postnasal drip, rhinorrhea, sore throat and trouble swallowing  Eyes: Positive for pain  Negative for discharge and visual disturbance  Respiratory: Negative for cough, shortness of breath and wheezing  Cardiovascular: Negative for chest pain, palpitations and leg swelling  Gastrointestinal: Negative for abdominal distention, abdominal pain, anorexia, blood in stool, diarrhea, nausea and vomiting  Endocrine: Negative for polydipsia, polyphagia and polyuria  Genitourinary: Negative for dysuria, frequency, hematuria and urgency  Musculoskeletal: Negative for arthralgias, gait problem, joint pain and joint swelling  Skin: Positive for rash  Negative for nail changes and pallor  Neurological: Negative for dizziness, syncope, speech difficulty, weakness, light-headedness, numbness and headaches  Hematological: Negative for adenopathy  Psychiatric/Behavioral: Negative for behavioral problems, confusion and sleep disturbance  The patient is not nervous/anxious  Current Outpatient Medications   Medication Sig Dispense Refill    mometasone (,ASMANEX TWISTHALER,) 110 MCG/INH AEPB inhaler Inhale 2 puffs every evening Rinse mouth after use  3 Inhaler 0    nabumetone (RELAFEN) 750 mg tablet Take 1 tablet (750 mg total) by mouth 2 (two) times a day 180 tablet 1    tamsulosin (FLOMAX) 0 4 mg Take 2 capsules (0 8 mg total) by mouth daily at bedtime 180 capsule 1    zolpidem (AMBIEN CR) 12 5 MG CR tablet Take 1 tablet (12 5 mg total) by mouth daily at bedtime as needed for sleep 90 tablet 0    colchicine (COLCRYS) 0 6 mg tablet Take 1 tablet (0 6 mg total) by mouth 2 (two) times a day (Patient not taking: Reported on 9/10/2020) 60 tablet 3    predniSONE 20 mg tablet 2 PO QD X 4 DAYS, THEN 1 PO QD X 4 DAYS, THEN 1/2 PO QD X 4 DAYS 14 tablet 0     No current facility-administered medications for this visit          Objective:    /70   Pulse 65   Temp 97 9 °F (36 6 °C) (Temporal)   Resp 18   Ht 6' 1 5" (1 867 m) Wt 87 1 kg (192 lb)   SpO2 98%   BMI 24 99 kg/m²        Physical Exam  Constitutional:       Appearance: He is well-developed  HENT:      Head: Normocephalic and atraumatic  Eyes:      General:         Right eye: No discharge  Left eye: No discharge  Conjunctiva/sclera: Conjunctivae normal       Pupils: Pupils are equal, round, and reactive to light  Neck:      Musculoskeletal: Neck supple  Thyroid: No thyromegaly  Vascular: No JVD  Cardiovascular:      Rate and Rhythm: Normal rate and regular rhythm  Heart sounds: Normal heart sounds  No murmur  Pulmonary:      Effort: Pulmonary effort is normal       Breath sounds: Normal breath sounds  No wheezing or rales  Abdominal:      General: Bowel sounds are normal       Palpations: Abdomen is soft  There is no mass  Tenderness: There is no abdominal tenderness  There is no guarding or rebound  Musculoskeletal: Normal range of motion  General: No tenderness or deformity  Lymphadenopathy:      Cervical: No cervical adenopathy  Skin:     General: Skin is warm and dry  Findings: Rash present  No erythema  Comments: RASH ON ARMS CONSISTENT WITH ERYTHEMA MULTIFORME   Neurological:      Mental Status: He is alert and oriented to person, place, and time  Psychiatric:         Behavior: Behavior normal          Thought Content:  Thought content normal          Judgment: Judgment normal                 Emi Workman MD

## 2020-09-23 DIAGNOSIS — J45.20 MILD INTERMITTENT ASTHMA WITHOUT COMPLICATION: ICD-10-CM

## 2020-10-22 DIAGNOSIS — M15.9 GENERALIZED OSTEOARTHRITIS OF MULTIPLE SITES: ICD-10-CM

## 2020-10-22 RX ORDER — NABUMETONE 750 MG/1
TABLET, FILM COATED ORAL
Qty: 60 TABLET | Refills: 0 | Status: SHIPPED | OUTPATIENT
Start: 2020-10-22 | End: 2020-11-21

## 2020-11-05 DIAGNOSIS — J45.20 MILD INTERMITTENT ASTHMA WITHOUT COMPLICATION: ICD-10-CM

## 2020-11-06 RX ORDER — MOMETASONE FUROATE 110 UG/1
INHALANT RESPIRATORY (INHALATION)
Qty: 1 INHALER | Refills: 3 | Status: SHIPPED | OUTPATIENT
Start: 2020-11-06 | End: 2021-03-03 | Stop reason: SDUPTHER

## 2020-11-21 DIAGNOSIS — M15.9 GENERALIZED OSTEOARTHRITIS OF MULTIPLE SITES: ICD-10-CM

## 2020-11-21 RX ORDER — NABUMETONE 750 MG/1
TABLET, FILM COATED ORAL
Qty: 60 TABLET | Refills: 0 | Status: SHIPPED | OUTPATIENT
Start: 2020-11-21 | End: 2020-11-21 | Stop reason: SDUPTHER

## 2020-11-21 RX ORDER — NABUMETONE 750 MG/1
750 TABLET, FILM COATED ORAL 2 TIMES DAILY
Qty: 180 TABLET | Refills: 1 | Status: SHIPPED | OUTPATIENT
Start: 2020-11-21 | End: 2021-06-22 | Stop reason: SDUPTHER

## 2020-12-15 DIAGNOSIS — G47.9 SLEEP DISTURBANCE: ICD-10-CM

## 2020-12-15 RX ORDER — ZOLPIDEM TARTRATE 12.5 MG/1
12.5 TABLET, FILM COATED, EXTENDED RELEASE ORAL
Qty: 90 TABLET | Refills: 0 | Status: SHIPPED | OUTPATIENT
Start: 2020-12-15 | End: 2021-02-23 | Stop reason: SDUPTHER

## 2021-02-19 ENCOUNTER — IMMUNIZATIONS (OUTPATIENT)
Dept: FAMILY MEDICINE CLINIC | Facility: HOSPITAL | Age: 76
End: 2021-02-19

## 2021-02-19 DIAGNOSIS — N40.0 PROSTATIC HYPERTROPHY: ICD-10-CM

## 2021-02-19 DIAGNOSIS — Z23 ENCOUNTER FOR IMMUNIZATION: Primary | ICD-10-CM

## 2021-02-19 PROCEDURE — 0011A SARS-COV-2 / COVID-19 MRNA VACCINE (MODERNA) 100 MCG: CPT

## 2021-02-19 PROCEDURE — 91301 SARS-COV-2 / COVID-19 MRNA VACCINE (MODERNA) 100 MCG: CPT

## 2021-02-19 RX ORDER — TAMSULOSIN HYDROCHLORIDE 0.4 MG/1
0.8 CAPSULE ORAL
Qty: 180 CAPSULE | Refills: 1 | Status: SHIPPED | OUTPATIENT
Start: 2021-02-19 | End: 2021-07-15

## 2021-02-23 DIAGNOSIS — G47.9 SLEEP DISTURBANCE: ICD-10-CM

## 2021-02-23 RX ORDER — ZOLPIDEM TARTRATE 12.5 MG/1
12.5 TABLET, FILM COATED, EXTENDED RELEASE ORAL
Qty: 90 TABLET | Refills: 0 | Status: SHIPPED | OUTPATIENT
Start: 2021-02-23 | End: 2021-03-02 | Stop reason: SDUPTHER

## 2021-03-01 ENCOUNTER — TELEPHONE (OUTPATIENT)
Dept: FAMILY MEDICINE CLINIC | Facility: CLINIC | Age: 76
End: 2021-03-01

## 2021-03-01 NOTE — TELEPHONE ENCOUNTER
Had his Ambien filled on 12/15/2020 for 90 tablets  For some reason he is 10 pills short  He swears that he only takes one a night  I informed him to check with the pharmacy to make sure they gave him the correct amount  He did and they told him that they gave him 90 pills  You sent in a script on 2/23/21 but the pharmacy will not fill that until 3/15  Please advise him what to do

## 2021-03-02 DIAGNOSIS — G47.9 SLEEP DISTURBANCE: ICD-10-CM

## 2021-03-02 RX ORDER — ZOLPIDEM TARTRATE 12.5 MG/1
12.5 TABLET, FILM COATED, EXTENDED RELEASE ORAL
Qty: 10 TABLET | Refills: 0 | Status: SHIPPED | OUTPATIENT
Start: 2021-03-02 | End: 2021-03-31 | Stop reason: SDUPTHER

## 2021-03-03 DIAGNOSIS — G47.9 SLEEP DISTURBANCE: Primary | ICD-10-CM

## 2021-03-03 DIAGNOSIS — J45.20 MILD INTERMITTENT ASTHMA WITHOUT COMPLICATION: ICD-10-CM

## 2021-03-03 RX ORDER — ZOLPIDEM TARTRATE 10 MG/1
10 TABLET ORAL
Qty: 10 TABLET | Refills: 0 | Status: SHIPPED | OUTPATIENT
Start: 2021-03-03 | End: 2021-07-15

## 2021-03-03 RX ORDER — MOMETASONE FUROATE 110 UG/1
2 INHALANT RESPIRATORY (INHALATION) 2 TIMES DAILY
Qty: 1 INHALER | Refills: 3 | Status: SHIPPED | OUTPATIENT
Start: 2021-03-03 | End: 2021-03-09

## 2021-03-03 NOTE — TELEPHONE ENCOUNTER
Just spoke to Ty Cooley - he went to  the 10 pills and the pharmacist told him that they cannot fill them at the same mg  They said it has to be changed to 10 mg

## 2021-03-03 NOTE — TELEPHONE ENCOUNTER
ANKIT Leon from the pharmacy called and wanted to know why they were filling the prescription with 10 pills  I explained the whole situation to him  He stated that he CAN fill the 12 5 mg with quantity of 90 (the previous script that was sent in on 2/23/21)  I told him that is ok and cancel the 10 mg 10 pills

## 2021-03-09 ENCOUNTER — TELEPHONE (OUTPATIENT)
Dept: FAMILY MEDICINE CLINIC | Facility: CLINIC | Age: 76
End: 2021-03-09

## 2021-03-09 DIAGNOSIS — J45.20 MILD INTERMITTENT ASTHMA WITHOUT COMPLICATION: Primary | ICD-10-CM

## 2021-03-09 RX ORDER — FLUTICASONE PROPIONATE 220 UG/1
2 AEROSOL, METERED RESPIRATORY (INHALATION) 2 TIMES DAILY
Qty: 3 INHALER | Refills: 1 | Status: SHIPPED | OUTPATIENT
Start: 2021-03-09 | End: 2021-04-23

## 2021-03-15 ENCOUNTER — OFFICE VISIT (OUTPATIENT)
Dept: FAMILY MEDICINE CLINIC | Facility: CLINIC | Age: 76
End: 2021-03-15
Payer: COMMERCIAL

## 2021-03-15 VITALS
RESPIRATION RATE: 16 BRPM | TEMPERATURE: 96.5 F | OXYGEN SATURATION: 98 % | SYSTOLIC BLOOD PRESSURE: 136 MMHG | BODY MASS INDEX: 26.28 KG/M2 | HEIGHT: 72 IN | HEART RATE: 72 BPM | WEIGHT: 194 LBS | DIASTOLIC BLOOD PRESSURE: 60 MMHG

## 2021-03-15 DIAGNOSIS — I10 ESSENTIAL HYPERTENSION: Primary | ICD-10-CM

## 2021-03-15 DIAGNOSIS — G47.9 DISTURBANCE OF SLEEP: ICD-10-CM

## 2021-03-15 DIAGNOSIS — M10.9 GOUT OF MULTIPLE SITES, UNSPECIFIED CAUSE, UNSPECIFIED CHRONICITY: ICD-10-CM

## 2021-03-15 DIAGNOSIS — N52.9 ERECTILE DYSFUNCTION, UNSPECIFIED ERECTILE DYSFUNCTION TYPE: ICD-10-CM

## 2021-03-15 DIAGNOSIS — M10.9 GOUTY ARTHROPATHY: ICD-10-CM

## 2021-03-15 DIAGNOSIS — K21.9 GASTROESOPHAGEAL REFLUX DISEASE WITHOUT ESOPHAGITIS: ICD-10-CM

## 2021-03-15 DIAGNOSIS — E78.2 MIXED HYPERLIPIDEMIA: ICD-10-CM

## 2021-03-15 DIAGNOSIS — J45.20 MILD INTERMITTENT ASTHMA WITHOUT COMPLICATION: ICD-10-CM

## 2021-03-15 DIAGNOSIS — M15.9 GENERALIZED OSTEOARTHRITIS OF MULTIPLE SITES: ICD-10-CM

## 2021-03-15 PROBLEM — L51.9 ERYTHEMA MULTIFORME: Status: RESOLVED | Noted: 2020-09-10 | Resolved: 2021-03-15

## 2021-03-15 PROCEDURE — 3008F BODY MASS INDEX DOCD: CPT | Performed by: FAMILY MEDICINE

## 2021-03-15 PROCEDURE — 3078F DIAST BP <80 MM HG: CPT | Performed by: FAMILY MEDICINE

## 2021-03-15 PROCEDURE — 99214 OFFICE O/P EST MOD 30 MIN: CPT | Performed by: FAMILY MEDICINE

## 2021-03-15 PROCEDURE — 3725F SCREEN DEPRESSION PERFORMED: CPT | Performed by: FAMILY MEDICINE

## 2021-03-15 PROCEDURE — 3075F SYST BP GE 130 - 139MM HG: CPT | Performed by: FAMILY MEDICINE

## 2021-03-15 PROCEDURE — 1036F TOBACCO NON-USER: CPT | Performed by: FAMILY MEDICINE

## 2021-03-15 PROCEDURE — 1160F RVW MEDS BY RX/DR IN RCRD: CPT | Performed by: FAMILY MEDICINE

## 2021-03-15 RX ORDER — COLCHICINE 0.6 MG/1
0.6 TABLET ORAL 2 TIMES DAILY
Qty: 60 TABLET | Refills: 3 | Status: SHIPPED | OUTPATIENT
Start: 2021-03-15 | End: 2021-08-31 | Stop reason: HOSPADM

## 2021-03-15 RX ORDER — SILDENAFIL 50 MG/1
50 TABLET, FILM COATED ORAL DAILY PRN
Qty: 10 TABLET | Refills: 0 | Status: SHIPPED | OUTPATIENT
Start: 2021-03-15 | End: 2021-08-31 | Stop reason: HOSPADM

## 2021-03-15 NOTE — PROGRESS NOTES
BMI Counseling: Body mass index is 26 31 kg/m²  The BMI is above normal  Nutrition recommendations include encouraging healthy choices of fruits and vegetables and moderation in carbohydrate intake  Exercise recommendations include exercising 3-5 times per week  No pharmacotherapy was ordered  Assessment/Plan:    Hypertension  STABLE  DENIES ANY CP, SOB, PALPITATIONS, OR HEADACHE  NOTES NO WATER RETENTION  COMPLIANT WITH MEDICATION  NO CONCERNS    - CONTINUE CURRENT TREATMENT PLAN  - MONITOR DIETARY SODIUM INTAKE  - ENCOURAGE PHYSICAL ACTIVITY  - RV 3 MONTHS      Hyperlipidemia  WATCHING CHOLESTEROL INTAKE  COMPLIANT WITH MEDICATION  NO CONCERNS    - CONTINUE TO MONITOR DIETARY CHOL INTAKE  - CONTINUE CURRENT MEDICATION  - ENCOURAGED PHYSICAL ACTIVITY        GERD (gastroesophageal reflux disease)  STABLE  DENIES ANY CP, INDIGESTION, OR COUGH  NOTES NO MELENA OR HEMATOCHEZIA  NO HEMATEMESIS  COMPLIANT WITH MEDICATION  NO CONCERNS    - CONTINUE CURRENT TREATMENT PLAN  - MEDICATION AS PRESCRIBED  - AVOID CAFFEINE, ALCOHOL OR SMOKING      Mild intermittent asthma without complication  STABLE    Generalized osteoarthritis of multiple sites  STABLE  DENIES ANY JOINT SWELLING OR REDNESS  JOINT STIFFNESS PRESENT  PAIN MANAGEMENT ADEQUATE    - CONTINUE CURRENT MANAGEMENT  - MEDICATION AS DIRECTED  - CALL / RETURN IF SYMPTOMS WORSEN         Diagnoses and all orders for this visit:    Essential hypertension    Mixed hyperlipidemia    Gastroesophageal reflux disease without esophagitis    Mild intermittent asthma without complication    Gouty arthropathy    Generalized osteoarthritis of multiple sites    Disturbance of sleep    Gout of multiple sites, unspecified cause, unspecified chronicity  -     colchicine (COLCRYS) 0 6 mg tablet; Take 1 tablet (0 6 mg total) by mouth 2 (two) times a day    Erectile dysfunction, unspecified erectile dysfunction type  -     sildenafil (VIAGRA) 50 MG tablet;  Take 1 tablet (50 mg total) by mouth daily as needed for erectile dysfunction    Other orders  -     Mometasone Furoate (ASMANEX HFA IN); Inhale 2 (two) times a day          Patient Instructions   CONTINUE CURRENT TREATMENT PLAN  MONITOR DIETARY SODIUM, CHOL, AND CARBOHYDRATE INTAKE  ENCOURAGE PHYSICAL ACTIVITY      RV 6 M, SOONER PRN        Return in about 6 months (around 9/15/2021) for Annual physical     Subjective:      Patient ID: Marcellus Alcala is a 77 y o  male  Chief Complaint   Patient presents with    Medication Management    Medication Refill       PATIENT RETURNS FOR ROUTINE EVALUATION OF PATIENT'S MEDICAL ISSUES    INDIVIDUAL MEDICAL ISSUES WITH THEIR CURRENT STATUS, ASSESSMENT AND PLANS ARE LISTED ABOVE          The following portions of the patient's history were reviewed and updated as appropriate: allergies, current medications, past family history, past medical history, past social history, past surgical history and problem list     Review of Systems   Constitutional: Negative for chills, fatigue and fever  HENT: Negative for congestion, ear discharge, ear pain, mouth sores, postnasal drip, sore throat and trouble swallowing  Eyes: Negative for pain, discharge and visual disturbance  Respiratory: Negative for cough, shortness of breath and wheezing  Cardiovascular: Negative for chest pain, palpitations and leg swelling  Gastrointestinal: Negative for abdominal distention, abdominal pain, blood in stool, diarrhea and nausea  Endocrine: Negative for polydipsia, polyphagia and polyuria  Genitourinary: Negative for dysuria, frequency, hematuria and urgency  Musculoskeletal: Positive for arthralgias  Negative for gait problem and joint swelling  Skin: Negative for pallor and rash  Neurological: Negative for dizziness, syncope, speech difficulty, weakness, light-headedness, numbness and headaches  Hematological: Negative for adenopathy     Psychiatric/Behavioral: Negative for behavioral problems, confusion and sleep disturbance  The patient is not nervous/anxious  Current Outpatient Medications   Medication Sig Dispense Refill    colchicine (COLCRYS) 0 6 mg tablet Take 1 tablet (0 6 mg total) by mouth 2 (two) times a day 60 tablet 3    Mometasone Furoate (ASMANEX HFA IN) Inhale 2 (two) times a day      nabumetone (RELAFEN) 750 mg tablet Take 1 tablet (750 mg total) by mouth 2 (two) times a day 180 tablet 1    zolpidem (AMBIEN CR) 12 5 MG CR tablet Take 1 tablet (12 5 mg total) by mouth daily at bedtime as needed for sleep 10 tablet 0    fluticasone (FLOVENT HFA) 220 mcg/act inhaler Inhale 2 puffs 2 (two) times a day Rinse mouth after use  (Patient not taking: Reported on 3/15/2021) 3 Inhaler 1    predniSONE 20 mg tablet 2 PO QD X 4 DAYS, THEN 1 PO QD X 4 DAYS, THEN 1/2 PO QD X 4 DAYS (Patient not taking: Reported on 3/15/2021) 14 tablet 0    sildenafil (VIAGRA) 50 MG tablet Take 1 tablet (50 mg total) by mouth daily as needed for erectile dysfunction 10 tablet 0    tamsulosin (FLOMAX) 0 4 mg Take 2 capsules (0 8 mg total) by mouth daily at bedtime (Patient not taking: Reported on 3/15/2021) 180 capsule 1    zolpidem (AMBIEN) 10 mg tablet Take 1 tablet (10 mg total) by mouth daily at bedtime as needed for sleep (Patient not taking: Reported on 3/15/2021) 10 tablet 0     No current facility-administered medications for this visit  Objective:    /60   Pulse 72   Temp (!) 96 5 °F (35 8 °C) (Temporal)   Resp 16   Ht 6' (1 829 m)   Wt 88 kg (194 lb)   SpO2 98%   BMI 26 31 kg/m²        Physical Exam  Constitutional:       Appearance: He is well-developed  HENT:      Head: Normocephalic and atraumatic  Eyes:      General:         Right eye: No discharge  Left eye: No discharge  Conjunctiva/sclera: Conjunctivae normal       Pupils: Pupils are equal, round, and reactive to light  Neck:      Musculoskeletal: Neck supple  Thyroid: No thyromegaly  Vascular: No JVD  Cardiovascular:      Rate and Rhythm: Normal rate and regular rhythm  Heart sounds: Normal heart sounds  No murmur  Pulmonary:      Effort: Pulmonary effort is normal       Breath sounds: Normal breath sounds  No wheezing or rales  Abdominal:      General: Bowel sounds are normal       Palpations: Abdomen is soft  There is no mass  Tenderness: There is no abdominal tenderness  There is no guarding or rebound  Musculoskeletal: Normal range of motion  General: No tenderness or deformity  Comments: MILD DJD CHANGES     Lymphadenopathy:      Cervical: No cervical adenopathy  Skin:     General: Skin is warm and dry  Findings: No erythema or rash  Neurological:      Mental Status: He is alert and oriented to person, place, and time  Psychiatric:         Behavior: Behavior normal          Thought Content:  Thought content normal          Judgment: Judgment normal                 Barbara Gould MD

## 2021-03-18 ENCOUNTER — IMMUNIZATIONS (OUTPATIENT)
Dept: FAMILY MEDICINE CLINIC | Facility: HOSPITAL | Age: 76
End: 2021-03-18

## 2021-03-18 DIAGNOSIS — Z23 ENCOUNTER FOR IMMUNIZATION: Primary | ICD-10-CM

## 2021-03-18 PROCEDURE — 0012A SARS-COV-2 / COVID-19 MRNA VACCINE (MODERNA) 100 MCG: CPT

## 2021-03-18 PROCEDURE — 91301 SARS-COV-2 / COVID-19 MRNA VACCINE (MODERNA) 100 MCG: CPT

## 2021-03-31 DIAGNOSIS — G47.9 SLEEP DISTURBANCE: ICD-10-CM

## 2021-03-31 RX ORDER — ZOLPIDEM TARTRATE 12.5 MG/1
12.5 TABLET, FILM COATED, EXTENDED RELEASE ORAL
Qty: 30 TABLET | Refills: 2 | Status: SHIPPED | OUTPATIENT
Start: 2021-03-31 | End: 2021-06-22 | Stop reason: SDUPTHER

## 2021-04-23 ENCOUNTER — TELEPHONE (OUTPATIENT)
Dept: FAMILY MEDICINE CLINIC | Facility: CLINIC | Age: 76
End: 2021-04-23

## 2021-04-23 DIAGNOSIS — J45.20 MILD INTERMITTENT ASTHMA WITHOUT COMPLICATION: Primary | ICD-10-CM

## 2021-04-23 NOTE — TELEPHONE ENCOUNTER
Called to have his inhaler renewed  Mometasone Furoate (ASMANEX HFA IN)  When I click on it it gives me a choise of 50 mcg, 100 mcg or 200 mcg  George Topete states his is 110 mcg  Has to be this brand because [employer] pays for this      Please put a lot of refills    Send to [facility] in [facility]

## 2021-04-26 ENCOUNTER — TELEPHONE (OUTPATIENT)
Dept: FAMILY MEDICINE CLINIC | Facility: CLINIC | Age: 76
End: 2021-04-26

## 2021-04-26 NOTE — TELEPHONE ENCOUNTER
Luna bonilla states Asmanex 110mcg twisthaler is not covered under [employer]  They are asking for a new rx to be called in for Flovent Disk or María Gavin

## 2021-04-27 NOTE — TELEPHONE ENCOUNTER
Juan Borjas states he is a [employer] employee and  gets his Asmanex for free  He said to disregard this request   No further action required

## 2021-05-17 ENCOUNTER — OFFICE VISIT (OUTPATIENT)
Dept: FAMILY MEDICINE CLINIC | Facility: CLINIC | Age: 76
End: 2021-05-17
Payer: COMMERCIAL

## 2021-05-17 VITALS
WEIGHT: 191 LBS | OXYGEN SATURATION: 99 % | HEART RATE: 72 BPM | HEIGHT: 72 IN | RESPIRATION RATE: 16 BRPM | TEMPERATURE: 98.2 F | BODY MASS INDEX: 25.87 KG/M2 | DIASTOLIC BLOOD PRESSURE: 60 MMHG | SYSTOLIC BLOOD PRESSURE: 114 MMHG

## 2021-05-17 DIAGNOSIS — J34.0 NASAL ULCER: Primary | ICD-10-CM

## 2021-05-17 PROCEDURE — 3008F BODY MASS INDEX DOCD: CPT | Performed by: FAMILY MEDICINE

## 2021-05-17 PROCEDURE — 1036F TOBACCO NON-USER: CPT | Performed by: FAMILY MEDICINE

## 2021-05-17 PROCEDURE — 3074F SYST BP LT 130 MM HG: CPT | Performed by: FAMILY MEDICINE

## 2021-05-17 PROCEDURE — 3078F DIAST BP <80 MM HG: CPT | Performed by: FAMILY MEDICINE

## 2021-05-17 PROCEDURE — 99213 OFFICE O/P EST LOW 20 MIN: CPT | Performed by: FAMILY MEDICINE

## 2021-05-17 PROCEDURE — 1160F RVW MEDS BY RX/DR IN RCRD: CPT | Performed by: FAMILY MEDICINE

## 2021-05-17 RX ORDER — CEPHALEXIN 500 MG/1
500 CAPSULE ORAL EVERY 12 HOURS SCHEDULED
Qty: 20 CAPSULE | Refills: 0 | Status: SHIPPED | OUTPATIENT
Start: 2021-05-17 | End: 2021-05-27

## 2021-05-17 NOTE — PROGRESS NOTES
Assessment/Plan:    No problem-specific Assessment & Plan notes found for this encounter  Diagnoses and all orders for this visit:    Nasal ulcer  -     cephalexin (KEFLEX) 500 mg capsule; Take 1 capsule (500 mg total) by mouth every 12 (twelve) hours for 10 days  -     mupirocin (BACTROBAN) 2 % ointment; Apply topically 3 (three) times a day          Patient Instructions   MONITOR SYMPTOMS  MEDS AS DIRECTED    IF SYMPTOMS PERSIST - MAY CONSIDER ENT EVAL      Return if symptoms worsen or fail to improve, for Next scheduled follow up  Subjective:      Patient ID: Lindsay Barron is a 77 y o  male  Chief Complaint   Patient presents with    swelling of Rt Nostril     x2 weeks - OTC meds no relief       R NASAL CRUSTING AND SORE X 2 WEEKS  NOT GETTING BETTER  NO BLEEDING  DENIES ANY FEVER OR CHILLS  NO RECENT ILLNESS  SOME INCREASE IN STRESS RECENTLY      The following portions of the patient's history were reviewed and updated as appropriate: allergies, current medications, past family history, past medical history, past social history, past surgical history and problem list     Review of Systems   Constitutional: Negative for chills, fatigue and fever  HENT: Positive for sinus pain  Negative for sore throat  Eyes: Negative for discharge  Respiratory: Negative for cough and chest tightness  Cardiovascular: Negative for chest pain and palpitations  Gastrointestinal: Negative for abdominal pain, diarrhea, nausea and vomiting  Musculoskeletal: Negative for arthralgias and gait problem  Neurological: Negative for dizziness, weakness and headaches  Hematological: Negative for adenopathy  Psychiatric/Behavioral: The patient is not nervous/anxious            Current Outpatient Medications   Medication Sig Dispense Refill    colchicine (COLCRYS) 0 6 mg tablet Take 1 tablet (0 6 mg total) by mouth 2 (two) times a day 60 tablet 3    mometasone (,ASMANEX TWISTHALER,) 110 MCG/INH AEPB inhaler Inhale 1 puff 2 (two) times a day Rinse mouth after use  3 Inhaler 3    nabumetone (RELAFEN) 750 mg tablet Take 1 tablet (750 mg total) by mouth 2 (two) times a day 180 tablet 1    sildenafil (VIAGRA) 50 MG tablet Take 1 tablet (50 mg total) by mouth daily as needed for erectile dysfunction 10 tablet 0    zolpidem (AMBIEN CR) 12 5 MG CR tablet Take 1 tablet (12 5 mg total) by mouth daily at bedtime as needed for sleep 30 tablet 2    cephalexin (KEFLEX) 500 mg capsule Take 1 capsule (500 mg total) by mouth every 12 (twelve) hours for 10 days 20 capsule 0    mupirocin (BACTROBAN) 2 % ointment Apply topically 3 (three) times a day 22 g 0    predniSONE 20 mg tablet 2 PO QD X 4 DAYS, THEN 1 PO QD X 4 DAYS, THEN 1/2 PO QD X 4 DAYS (Patient not taking: Reported on 3/15/2021) 14 tablet 0    tamsulosin (FLOMAX) 0 4 mg Take 2 capsules (0 8 mg total) by mouth daily at bedtime (Patient not taking: Reported on 3/15/2021) 180 capsule 1    zolpidem (AMBIEN) 10 mg tablet Take 1 tablet (10 mg total) by mouth daily at bedtime as needed for sleep (Patient not taking: Reported on 3/15/2021) 10 tablet 0     No current facility-administered medications for this visit  Objective:    /60   Pulse 72   Temp 98 2 °F (36 8 °C) (Temporal)   Resp 16   Ht 6' (1 829 m)   Wt 86 6 kg (191 lb)   SpO2 99%   BMI 25 90 kg/m²        Physical Exam  Constitutional:       Appearance: He is well-developed  HENT:      Head: Normocephalic and atraumatic  Nose:      Comments: R NOSE  SOME CRUSTING, SMALL ULCERATION NOTED  Eyes:      General:         Right eye: No discharge  Left eye: No discharge  Conjunctiva/sclera: Conjunctivae normal       Pupils: Pupils are equal, round, and reactive to light  Neck:      Musculoskeletal: Neck supple  Thyroid: No thyromegaly  Vascular: No JVD  Cardiovascular:      Rate and Rhythm: Normal rate and regular rhythm  Heart sounds: Normal heart sounds  No murmur  Pulmonary:      Effort: Pulmonary effort is normal       Breath sounds: Normal breath sounds  No wheezing or rales  Abdominal:      General: Bowel sounds are normal       Palpations: Abdomen is soft  There is no mass  Tenderness: There is no abdominal tenderness  There is no guarding or rebound  Musculoskeletal: Normal range of motion  General: No tenderness or deformity  Lymphadenopathy:      Cervical: No cervical adenopathy  Skin:     General: Skin is warm and dry  Findings: No erythema or rash  Neurological:      Mental Status: He is alert and oriented to person, place, and time  Psychiatric:         Behavior: Behavior normal          Thought Content:  Thought content normal          Judgment: Judgment normal                 Jose Thibodeaux MD

## 2021-06-22 DIAGNOSIS — G47.9 SLEEP DISTURBANCE: ICD-10-CM

## 2021-06-22 DIAGNOSIS — M15.9 GENERALIZED OSTEOARTHRITIS OF MULTIPLE SITES: ICD-10-CM

## 2021-06-22 RX ORDER — ZOLPIDEM TARTRATE 12.5 MG/1
12.5 TABLET, FILM COATED, EXTENDED RELEASE ORAL
Qty: 30 TABLET | Refills: 2 | Status: SHIPPED | OUTPATIENT
Start: 2021-06-22 | End: 2021-07-15

## 2021-06-22 RX ORDER — NABUMETONE 750 MG/1
750 TABLET, FILM COATED ORAL 2 TIMES DAILY
Qty: 180 TABLET | Refills: 1 | Status: SHIPPED | OUTPATIENT
Start: 2021-06-22 | End: 2021-12-13

## 2021-07-15 ENCOUNTER — TELEPHONE (OUTPATIENT)
Dept: FAMILY MEDICINE CLINIC | Facility: CLINIC | Age: 76
End: 2021-07-15

## 2021-07-15 NOTE — TELEPHONE ENCOUNTER
Lali Spicer states he wants to try Belsomra 20mg instead of Ambien  He can get it for free with [employer]  Also wants a rx for Clairinex D  Its a also free with [facility]      Please call into  [facility]

## 2021-07-30 DIAGNOSIS — K21.9 GASTROESOPHAGEAL REFLUX DISEASE WITHOUT ESOPHAGITIS: ICD-10-CM

## 2021-07-30 DIAGNOSIS — E78.01 FAMILIAL HYPERCHOLESTEROLEMIA: ICD-10-CM

## 2021-07-30 DIAGNOSIS — Z00.00 MEDICARE ANNUAL WELLNESS VISIT, SUBSEQUENT: Primary | ICD-10-CM

## 2021-07-30 DIAGNOSIS — N40.0 PROSTATIC HYPERTROPHY: ICD-10-CM

## 2021-07-30 DIAGNOSIS — Z11.59 ENCOUNTER FOR HEPATITIS C SCREENING TEST FOR LOW RISK PATIENT: ICD-10-CM

## 2021-07-30 DIAGNOSIS — E78.2 MIXED HYPERLIPIDEMIA: ICD-10-CM

## 2021-07-30 DIAGNOSIS — Z13.29 SCREENING FOR HYPOTHYROIDISM: ICD-10-CM

## 2021-07-30 DIAGNOSIS — Z12.5 ENCOUNTER FOR PROSTATE CANCER SCREENING: ICD-10-CM

## 2021-07-30 DIAGNOSIS — I10 ESSENTIAL HYPERTENSION: ICD-10-CM

## 2021-07-30 DIAGNOSIS — M10.9 GOUTY ARTHROPATHY: ICD-10-CM

## 2021-08-18 ENCOUNTER — TELEPHONE (OUTPATIENT)
Dept: FAMILY MEDICINE CLINIC | Facility: CLINIC | Age: 76
End: 2021-08-18

## 2021-08-18 DIAGNOSIS — N40.0 PROSTATIC HYPERTROPHY: ICD-10-CM

## 2021-08-18 LAB
ALBUMIN SERPL-MCNC: 4.3 G/DL (ref 3.6–5.1)
ALBUMIN/GLOB SERPL: 2.3 (CALC) (ref 1–2.5)
ALP SERPL-CCNC: 75 U/L (ref 35–144)
ALT SERPL-CCNC: 14 U/L (ref 9–46)
AST SERPL-CCNC: 18 U/L (ref 10–35)
BASOPHILS # BLD AUTO: 20 CELLS/UL (ref 0–200)
BASOPHILS NFR BLD AUTO: 0.4 %
BILIRUB SERPL-MCNC: 0.7 MG/DL (ref 0.2–1.2)
BUN SERPL-MCNC: 11 MG/DL (ref 7–25)
BUN/CREAT SERPL: ABNORMAL (CALC) (ref 6–22)
CALCIUM SERPL-MCNC: 9.3 MG/DL (ref 8.6–10.3)
CHLORIDE SERPL-SCNC: 95 MMOL/L (ref 98–110)
CHOLEST SERPL-MCNC: 193 MG/DL
CHOLEST/HDLC SERPL: 4.1 (CALC)
CO2 SERPL-SCNC: 32 MMOL/L (ref 20–32)
CREAT SERPL-MCNC: 0.88 MG/DL (ref 0.7–1.18)
EOSINOPHIL # BLD AUTO: 60 CELLS/UL (ref 15–500)
EOSINOPHIL NFR BLD AUTO: 1.2 %
ERYTHROCYTE [DISTWIDTH] IN BLOOD BY AUTOMATED COUNT: 12.4 % (ref 11–15)
GLOBULIN SER CALC-MCNC: 1.9 G/DL (CALC) (ref 1.9–3.7)
GLUCOSE SERPL-MCNC: 95 MG/DL (ref 65–99)
HCT VFR BLD AUTO: 41.6 % (ref 38.5–50)
HCV AB S/CO SERPL IA: 0.02
HCV AB SERPL QL IA: NORMAL
HDLC SERPL-MCNC: 47 MG/DL
HGB BLD-MCNC: 14.1 G/DL (ref 13.2–17.1)
LDLC SERPL CALC-MCNC: 124 MG/DL (CALC)
LYMPHOCYTES # BLD AUTO: 1385 CELLS/UL (ref 850–3900)
LYMPHOCYTES NFR BLD AUTO: 27.7 %
MCH RBC QN AUTO: 31.4 PG (ref 27–33)
MCHC RBC AUTO-ENTMCNC: 33.9 G/DL (ref 32–36)
MCV RBC AUTO: 92.7 FL (ref 80–100)
MONOCYTES # BLD AUTO: 460 CELLS/UL (ref 200–950)
MONOCYTES NFR BLD AUTO: 9.2 %
NEUTROPHILS # BLD AUTO: 3075 CELLS/UL (ref 1500–7800)
NEUTROPHILS NFR BLD AUTO: 61.5 %
NONHDLC SERPL-MCNC: 146 MG/DL (CALC)
PLATELET # BLD AUTO: 235 THOUSAND/UL (ref 140–400)
PMV BLD REES-ECKER: 10.5 FL (ref 7.5–12.5)
POTASSIUM SERPL-SCNC: 4.6 MMOL/L (ref 3.5–5.3)
PROT SERPL-MCNC: 6.2 G/DL (ref 6.1–8.1)
PSA SERPL-MCNC: 0.7 NG/ML
RBC # BLD AUTO: 4.49 MILLION/UL (ref 4.2–5.8)
SL AMB EGFR AFRICAN AMERICAN: 98 ML/MIN/1.73M2
SL AMB EGFR NON AFRICAN AMERICAN: 85 ML/MIN/1.73M2
SODIUM SERPL-SCNC: 136 MMOL/L (ref 135–146)
TRIGL SERPL-MCNC: 117 MG/DL
TSH SERPL-ACNC: 1.42 MIU/L (ref 0.4–4.5)
URATE SERPL-MCNC: 6.8 MG/DL (ref 4–8)
WBC # BLD AUTO: 5 THOUSAND/UL (ref 3.8–10.8)

## 2021-08-18 RX ORDER — TAMSULOSIN HYDROCHLORIDE 0.4 MG/1
CAPSULE ORAL
Qty: 180 CAPSULE | Refills: 1 | Status: SHIPPED | OUTPATIENT
Start: 2021-08-18 | End: 2022-03-01

## 2021-08-18 NOTE — TELEPHONE ENCOUNTER
SPOKE TO PATIENT TO VERIFY PATIENT IS STILLING TAKING MED tamsulosin (FLOMAX) 0 4 mg     PATIENT STATED HE IS TAKING  MED  BUT HAS ENOUGH PILLS TO LAST HIM FOR MONTHS        Schuyler Smith LPN

## 2021-08-26 ENCOUNTER — RA CDI HCC (OUTPATIENT)
Dept: OTHER | Facility: HOSPITAL | Age: 76
End: 2021-08-26

## 2021-08-26 NOTE — PROGRESS NOTES
[facility] 76  coding opportunities       Chart reviewed, no opportunity found: CHART REVIEWED, NO OPPORTUNITY FOUND                        Patients insurance company: [facility]  (Medicare Advantage and Commercial)

## 2021-08-31 PROBLEM — J34.0 NASAL ULCER: Status: RESOLVED | Noted: 2021-05-17 | Resolved: 2021-08-31

## 2021-08-31 RX ORDER — ZOLPIDEM TARTRATE 12.5 MG/1
TABLET, FILM COATED, EXTENDED RELEASE ORAL
COMMUNITY
Start: 2021-07-22 | End: 2021-09-23

## 2021-08-31 RX ORDER — ZOLPIDEM TARTRATE 12.5 MG/1
TABLET, FILM COATED, EXTENDED RELEASE ORAL
COMMUNITY
Start: 2021-06-01 | End: 2021-08-31 | Stop reason: SDUPTHER

## 2021-09-01 ENCOUNTER — OFFICE VISIT (OUTPATIENT)
Dept: FAMILY MEDICINE CLINIC | Facility: CLINIC | Age: 76
End: 2021-09-01
Payer: COMMERCIAL

## 2021-09-01 VITALS
WEIGHT: 194 LBS | RESPIRATION RATE: 12 BRPM | SYSTOLIC BLOOD PRESSURE: 124 MMHG | BODY MASS INDEX: 26.28 KG/M2 | OXYGEN SATURATION: 98 % | DIASTOLIC BLOOD PRESSURE: 56 MMHG | HEIGHT: 72 IN | TEMPERATURE: 97 F | HEART RATE: 72 BPM

## 2021-09-01 DIAGNOSIS — M15.9 GENERALIZED OSTEOARTHRITIS OF MULTIPLE SITES: ICD-10-CM

## 2021-09-01 DIAGNOSIS — J45.20 MILD INTERMITTENT ASTHMA WITHOUT COMPLICATION: ICD-10-CM

## 2021-09-01 DIAGNOSIS — Z00.00 MEDICARE ANNUAL WELLNESS VISIT, SUBSEQUENT: ICD-10-CM

## 2021-09-01 DIAGNOSIS — N40.0 HYPERPLASIA OF PROSTATE: ICD-10-CM

## 2021-09-01 DIAGNOSIS — E78.2 MIXED HYPERLIPIDEMIA: Primary | ICD-10-CM

## 2021-09-01 DIAGNOSIS — Z12.5 ENCOUNTER FOR PROSTATE CANCER SCREENING: ICD-10-CM

## 2021-09-01 DIAGNOSIS — Z13.29 SCREENING FOR HYPOTHYROIDISM: ICD-10-CM

## 2021-09-01 DIAGNOSIS — G47.9 DISTURBANCE OF SLEEP: ICD-10-CM

## 2021-09-01 DIAGNOSIS — Z23 NEED FOR INFLUENZA VACCINATION: ICD-10-CM

## 2021-09-01 DIAGNOSIS — K21.9 GASTROESOPHAGEAL REFLUX DISEASE WITHOUT ESOPHAGITIS: ICD-10-CM

## 2021-09-01 DIAGNOSIS — I10 ESSENTIAL HYPERTENSION: ICD-10-CM

## 2021-09-01 DIAGNOSIS — Z12.11 COLON CANCER SCREENING: ICD-10-CM

## 2021-09-01 PROCEDURE — 1125F AMNT PAIN NOTED PAIN PRSNT: CPT | Performed by: FAMILY MEDICINE

## 2021-09-01 PROCEDURE — 3078F DIAST BP <80 MM HG: CPT | Performed by: FAMILY MEDICINE

## 2021-09-01 PROCEDURE — 1160F RVW MEDS BY RX/DR IN RCRD: CPT | Performed by: FAMILY MEDICINE

## 2021-09-01 PROCEDURE — 3288F FALL RISK ASSESSMENT DOCD: CPT | Performed by: FAMILY MEDICINE

## 2021-09-01 PROCEDURE — 3725F SCREEN DEPRESSION PERFORMED: CPT | Performed by: FAMILY MEDICINE

## 2021-09-01 PROCEDURE — 99215 OFFICE O/P EST HI 40 MIN: CPT | Performed by: FAMILY MEDICINE

## 2021-09-01 PROCEDURE — G0008 ADMIN INFLUENZA VIRUS VAC: HCPCS

## 2021-09-01 PROCEDURE — G0439 PPPS, SUBSEQ VISIT: HCPCS | Performed by: FAMILY MEDICINE

## 2021-09-01 PROCEDURE — 93000 ELECTROCARDIOGRAM COMPLETE: CPT | Performed by: FAMILY MEDICINE

## 2021-09-01 PROCEDURE — 1170F FXNL STATUS ASSESSED: CPT | Performed by: FAMILY MEDICINE

## 2021-09-01 PROCEDURE — 3074F SYST BP LT 130 MM HG: CPT | Performed by: FAMILY MEDICINE

## 2021-09-01 PROCEDURE — 1036F TOBACCO NON-USER: CPT | Performed by: FAMILY MEDICINE

## 2021-09-01 PROCEDURE — 3008F BODY MASS INDEX DOCD: CPT | Performed by: FAMILY MEDICINE

## 2021-09-01 PROCEDURE — 90662 IIV NO PRSV INCREASED AG IM: CPT

## 2021-09-01 RX ORDER — DIPHENOXYLATE HYDROCHLORIDE AND ATROPINE SULFATE 2.5; .025 MG/1; MG/1
1 TABLET ORAL DAILY
COMMUNITY

## 2021-09-01 NOTE — LETTER
JOHANNE HAMLIN      Current Outpatient Medications:     loratadine-pseudoephedrine (CLARITIN-D 12-HOUR) 5-120 mg per tablet, Take 1 tablet by mouth 2 (two) times a day, Disp: 180 tablet, Rfl: 1    mometasone (,ASMANEX TWISTHALER,) 110 MCG/INH AEPB inhaler, Inhale 1 puff 2 (two) times a day Rinse mouth after use , Disp: 3 Inhaler, Rfl: 3    nabumetone (RELAFEN) 750 mg tablet, Take 1 tablet (750 mg total) by mouth 2 (two) times a day, Disp: 180 tablet, Rfl: 1    tamsulosin (FLOMAX) 0 4 mg, TAKE 2 CAPSULES(0 8 MG) BY MOUTH DAILY AT BEDTIME, Disp: 180 capsule, Rfl: 1    zolpidem (AMBIEN CR) 12 5 MG CR tablet, , Disp: , Rfl:       Recent Results (from the past 672 hour(s))   CBC and differential    Collection Time: 08/16/21 10:59 AM   Result Value Ref Range    White Blood Cell Count 5 0 3 8 - 10 8 Thousand/uL    Red Blood Cell Count 4 49 4 20 - 5 80 Million/uL    Hemoglobin 14 1 13 2 - 17 1 g/dL    HCT 41 6 38 5 - 50 0 %    MCV 92 7 80 0 - 100 0 fL    MCH 31 4 27 0 - 33 0 pg    MCHC 33 9 32 0 - 36 0 g/dL    RDW 12 4 11 0 - 15 0 %    Platelet Count 983 992 - 400 Thousand/uL    SL AMB MPV 10 5 7 5 - 12 5 fL    Neutrophils (Absolute) 3,075 1,500 - 7,800 cells/uL    Lymphocytes (Absolute) 1,385 850 - 3,900 cells/uL    Monocytes (Absolute) 460 200 - 950 cells/uL    Eosinophils (Absolute) 60 15 - 500 cells/uL    Basophils ABS 20 0 - 200 cells/uL    Neutrophils 61 5 %    Lymphocytes 27 7 %    Monocytes 9 2 %    Eosinophils 1 2 %    Basophils PCT 0 4 %   Comprehensive metabolic panel    Collection Time: 08/16/21 10:59 AM   Result Value Ref Range    Glucose, Random 95 65 - 99 mg/dL    BUN 11 7 - 25 mg/dL    Creatinine 0 88 0 70 - 1 18 mg/dL    eGFR Non  85 > OR = 60 mL/min/1 73m2    eGFR  98 > OR = 60 mL/min/1 73m2    SL AMB BUN/CREATININE RATIO NOT APPLICABLE 6 - 22 (calc)    Sodium 136 135 - 146 mmol/L    Potassium 4 6 3 5 - 5 3 mmol/L    Chloride 95 (L) 98 - 110 mmol/L    CO2 32 20 - 32 mmol/L    Calcium 9 3 8 6 - 10 3 mg/dL    Protein, Total 6 2 6 1 - 8 1 g/dL    Albumin 4 3 3 6 - 5 1 g/dL    Globulin 1 9 1 9 - 3 7 g/dL (calc)    Albumin/Globulin Ratio 2 3 1 0 - 2 5 (calc)    TOTAL BILIRUBIN 0 7 0 2 - 1 2 mg/dL    Alkaline Phosphatase 75 35 - 144 U/L    AST 18 10 - 35 U/L    ALT 14 9 - 46 U/L   Lipid panel    Collection Time: 08/16/21 10:59 AM   Result Value Ref Range    Total Cholesterol 193 <200 mg/dL    HDL 47 > OR = 40 mg/dL    Triglycerides 117 <150 mg/dL    LDL Calculated 124 (H) mg/dL (calc)    Chol HDLC Ratio 4 1 <5 0 (calc)    Non-HDL Cholesterol 146 (H) <130 mg/dL (calc)   PSA Total (Reflex To Free)    Collection Time: 08/16/21 10:59 AM   Result Value Ref Range    Prostate Specific Antigen Total 0 7 < OR = 4 0 ng/mL   TSH, 3rd generation    Collection Time: 08/16/21 10:59 AM   Result Value Ref Range    TSH 1 42 0 40 - 4 50 mIU/L   Hepatitis C Ab W/Refl To HCV RNA, Qn, PCR    Collection Time: 08/16/21 10:59 AM   Result Value Ref Range    HEP C AB NON-REACTIVE NON-REACTIVE    Signal to Cut-Off 0 02 <1 00   Uric acid    Collection Time: 08/16/21 10:59 AM   Result Value Ref Range    Uric Acid 6 8 4 0 - 8 0 mg/dL

## 2021-09-01 NOTE — PATIENT INSTRUCTIONS
DISCUSSED HEALTH MAINTENENCE ISSUES  BW WILL BE REVIEWED  ENCOURAGED HEALTHY DIET AND EXERCISE  COLONOSCOPY WILL BE ORDERED  RV FOR ANNUAL HEALTH EXAM IN 1 YEAR  RV SOONER IF THERE ARE ANY CONCERNS    Recent Results (from the past 672 hour(s))   CBC and differential    Collection Time: 08/16/21 10:59 AM   Result Value Ref Range    White Blood Cell Count 5 0 3 8 - 10 8 Thousand/uL    Red Blood Cell Count 4 49 4 20 - 5 80 Million/uL    Hemoglobin 14 1 13 2 - 17 1 g/dL    HCT 41 6 38 5 - 50 0 %    MCV 92 7 80 0 - 100 0 fL    MCH 31 4 27 0 - 33 0 pg    MCHC 33 9 32 0 - 36 0 g/dL    RDW 12 4 11 0 - 15 0 %    Platelet Count 726 003 - 400 Thousand/uL    SL AMB MPV 10 5 7 5 - 12 5 fL    Neutrophils (Absolute) 3,075 1,500 - 7,800 cells/uL    Lymphocytes (Absolute) 1,385 850 - 3,900 cells/uL    Monocytes (Absolute) 460 200 - 950 cells/uL    Eosinophils (Absolute) 60 15 - 500 cells/uL    Basophils ABS 20 0 - 200 cells/uL    Neutrophils 61 5 %    Lymphocytes 27 7 %    Monocytes 9 2 %    Eosinophils 1 2 %    Basophils PCT 0 4 %   Comprehensive metabolic panel    Collection Time: 08/16/21 10:59 AM   Result Value Ref Range    Glucose, Random 95 65 - 99 mg/dL    BUN 11 7 - 25 mg/dL    Creatinine 0 88 0 70 - 1 18 mg/dL    eGFR Non  85 > OR = 60 mL/min/1 73m2    eGFR  98 > OR = 60 mL/min/1 73m2    SL AMB BUN/CREATININE RATIO NOT APPLICABLE 6 - 22 (calc)    Sodium 136 135 - 146 mmol/L    Potassium 4 6 3 5 - 5 3 mmol/L    Chloride 95 (L) 98 - 110 mmol/L    CO2 32 20 - 32 mmol/L    Calcium 9 3 8 6 - 10 3 mg/dL    Protein, Total 6 2 6 1 - 8 1 g/dL    Albumin 4 3 3 6 - 5 1 g/dL    Globulin 1 9 1 9 - 3 7 g/dL (calc)    Albumin/Globulin Ratio 2 3 1 0 - 2 5 (calc)    TOTAL BILIRUBIN 0 7 0 2 - 1 2 mg/dL    Alkaline Phosphatase 75 35 - 144 U/L    AST 18 10 - 35 U/L    ALT 14 9 - 46 U/L   Lipid panel    Collection Time: 08/16/21 10:59 AM   Result Value Ref Range    Total Cholesterol 193 <200 mg/dL    HDL 47 > OR = 40 mg/dL    Triglycerides 117 <150 mg/dL    LDL Calculated 124 (H) mg/dL (calc)    Chol HDLC Ratio 4 1 <5 0 (calc)    Non-HDL Cholesterol 146 (H) <130 mg/dL (calc)   PSA Total (Reflex To Free)    Collection Time: 08/16/21 10:59 AM   Result Value Ref Range    Prostate Specific Antigen Total 0 7 < OR = 4 0 ng/mL   TSH, 3rd generation    Collection Time: 08/16/21 10:59 AM   Result Value Ref Range    TSH 1 42 0 40 - 4 50 mIU/L   Hepatitis C Ab W/Refl To HCV RNA, Qn, PCR    Collection Time: 08/16/21 10:59 AM   Result Value Ref Range    HEP C AB NON-REACTIVE NON-REACTIVE    Signal to Cut-Off 0 02 <1 00   Uric acid    Collection Time: 08/16/21 10:59 AM   Result Value Ref Range    Uric Acid 6 8 4 0 - 8 0 mg/dL       Medicare Preventive Visit Patient Instructions  Thank you for completing your Welcome to Medicare Visit or Medicare Annual Wellness Visit today  Your next wellness visit will be due in one year (9/2/2022)  The screening/preventive services that you may require over the next 5-10 years are detailed below  Some tests may not apply to you based off risk factors and/or age  Screening tests ordered at today's visit but not completed yet may show as past due  Also, please note that scanned in results may not display below  Preventive Screenings:  Service Recommendations Previous Testing/Comments   Colorectal Cancer Screening  · Colonoscopy    · Fecal Occult Blood Test (FOBT)/Fecal Immunochemical Test (FIT)  · Fecal DNA/Cologuard Test  · Flexible Sigmoidoscopy Age: 54-65 years old   Colonoscopy: every 10 years (May be performed more frequently if at higher risk)  OR  FOBT/FIT: every 1 year  OR  Cologuard: every 3 years  OR  Sigmoidoscopy: every 5 years  Screening may be recommended earlier than age 48 if at higher risk for colorectal cancer  Also, an individualized decision between you and your healthcare provider will decide whether screening between the ages of 74-80 would be appropriate   Colonoscopy: 01/02/2020  FOBT/FIT: Not on file  Cologuard: Not on file  Sigmoidoscopy: Not on file    Screening Current     Prostate Cancer Screening Individualized decision between patient and health care provider in men between ages of 53-78   Medicare will cover every 12 months beginning on the day after your 50th birthday PSA: 0 7 ng/mL     Screening Current     Hepatitis C Screening Once for adults born between 1945 and 1965  More frequently in patients at high risk for Hepatitis C Hep C Antibody: 08/16/2021    Screening Current   Diabetes Screening 1-2 times per year if you're at risk for diabetes or have pre-diabetes Fasting glucose: No results in last 5 years   A1C: No results in last 5 years    Screening Current   Cholesterol Screening Once every 5 years if you don't have a lipid disorder  May order more often based on risk factors  Lipid panel: 08/16/2021    Screening Not Indicated  History Lipid Disorder      Other Preventive Screenings Covered by Medicare:  1  Abdominal Aortic Aneurysm (AAA) Screening: covered once if your at risk  You're considered to be at risk if you have a family history of AAA or a male between the age of 73-68 who smoking at least 100 cigarettes in your lifetime  2  Lung Cancer Screening: covers low dose CT scan once per year if you meet all of the following conditions: (1) Age 50-69; (2) No signs or symptoms of lung cancer; (3) Current smoker or have quit smoking within the last 15 years; (4) You have a tobacco smoking history of at least 30 pack years (packs per day x number of years you smoked); (5) You get a written order from a healthcare provider  3  Glaucoma Screening: covered annually if you're considered high risk: (1) You have diabetes OR (2) Family history of glaucoma OR (3)  aged 48 and older OR (3)  American aged 72 and older  3   Osteoporosis Screening: covered every 2 years if you meet one of the following conditions: (1) Have a vertebral abnormality; (2) On glucocorticoid therapy for more than 3 months; (3) Have primary hyperparathyroidism; (4) On osteoporosis medications and need to assess response to drug therapy  5  HIV Screening: covered annually if you're between the age of 12-76  Also covered annually if you are younger than 13 and older than 72 with risk factors for HIV infection  For pregnant patients, it is covered up to 3 times per pregnancy  Immunizations:  Immunization Recommendations   Influenza Vaccine Annual influenza vaccination during flu season is recommended for all persons aged >= 6 months who do not have contraindications   Pneumococcal Vaccine (Prevnar and Pneumovax)  * Prevnar = PCV13  * Pneumovax = PPSV23 Adults 25-60 years old: 1-3 doses may be recommended based on certain risk factors  Adults 72 years old: Prevnar (PCV13) vaccine recommended followed by Pneumovax (PPSV23) vaccine  If already received PPSV23 since turning 65, then PCV13 recommended at least one year after PPSV23 dose  Hepatitis B Vaccine 3 dose series if at intermediate or high risk (ex: diabetes, end stage renal disease, liver disease)   Tetanus (Td) Vaccine - COST NOT COVERED BY MEDICARE PART B Following completion of primary series, a booster dose should be given every 10 years to maintain immunity against tetanus  Td may also be given as tetanus wound prophylaxis  Tdap Vaccine - COST NOT COVERED BY MEDICARE PART B Recommended at least once for all adults  For pregnant patients, recommended with each pregnancy     Shingles Vaccine (Shingrix) - COST NOT COVERED BY MEDICARE PART B  2 shot series recommended in those aged 48 and above     Health Maintenance Due:      Topic Date Due    Colorectal Cancer Screening  01/02/2030    Hepatitis C Screening  Completed     Immunizations Due:      Topic Date Due    DTaP,Tdap,and Td Vaccines (1 - Tdap) 10/11/1967    Pneumococcal Vaccine: 65+ Years (1 of 2 - PPSV23) 10/20/2016    Influenza Vaccine (1) 09/01/2021     Advance Directives   What are advance directives? Advance directives are legal documents that state your wishes and plans for medical care  These plans are made ahead of time in case you lose your ability to make decisions for yourself  Advance directives can apply to any medical decision, such as the treatments you want, and if you want to donate organs  What are the types of advance directives? There are many types of advance directives, and each state has rules about how to use them  You may choose a combination of any of the following:  · Living will: This is a written record of the treatment you want  You can also choose which treatments you do not want, which to limit, and which to stop at a certain time  This includes surgery, medicine, IV fluid, and tube feedings  · Durable power of  for healthcare [facility] [place]): This is a written record that states who you want to make healthcare choices for you when you are unable to make them for yourself  This person, called a proxy, is usually a family member or a friend  You may choose more than 1 proxy  · Do not resuscitate (DNR) order:  A DNR order is used in case your heart stops beating or you stop breathing  It is a request not to have certain forms of treatment, such as CPR  A DNR order may be included in other types of advance directives  · Medical directive: This covers the care that you want if you are in a coma, near death, or unable to make decisions for yourself  You can list the treatments you want for each condition  Treatment may include pain medicine, surgery, blood transfusions, dialysis, IV or tube feedings, and a ventilator (breathing machine)  · Values history: This document has questions about your views, beliefs, and how you feel and think about life  This information can help others choose the care that you would choose  Why are advance directives important? An advance directive helps you control your care   Although spoken wishes may be used, it is better to have your wishes written down  Spoken wishes can be misunderstood, or not followed  Treatments may be given even if you do not want them  An advance directive may make it easier for your family to make difficult choices about your care  Weight Management   Why it is important to manage your weight:  Being overweight increases your risk of health conditions such as heart disease, high blood pressure, type 2 diabetes, and certain types of cancer  It can also increase your risk for osteoarthritis, sleep apnea, and other respiratory problems  Aim for a slow, steady weight loss  Even a small amount of weight loss can lower your risk of health problems  How to lose weight safely:  A safe and healthy way to lose weight is to eat fewer calories and get regular exercise  You can lose up about 1 pound a week by decreasing the number of calories you eat by 500 calories each day  Healthy meal plan for weight management:  A healthy meal plan includes a variety of foods, contains fewer calories, and helps you stay healthy  A healthy meal plan includes the following:  · Eat whole-grain foods more often  A healthy meal plan should contain fiber  Fiber is the part of grains, fruits, and vegetables that is not broken down by your body  Whole-grain foods are healthy and provide extra fiber in your diet  Some examples of whole-grain foods are whole-wheat breads and pastas, oatmeal, brown rice, and bulgur  · Eat a variety of vegetables every day  Include dark, leafy greens such as spinach, kale, gabriella greens, and mustard greens  Eat yellow and orange vegetables such as carrots, sweet potatoes, and winter squash  · Eat a variety of fruits every day  Choose fresh or canned fruit (canned in its own juice or light syrup) instead of juice  Fruit juice has very little or no fiber  · Eat low-fat dairy foods  Drink fat-free (skim) milk or 1% milk  Eat fat-free yogurt and low-fat cottage cheese   Try low-fat cheeses such as mozzarella and other reduced-fat cheeses  · Choose meat and other protein foods that are low in fat  Choose beans or other legumes such as split peas or lentils  Choose fish, skinless poultry (chicken or turkey), or lean cuts of red meat (beef or pork)  Before you cook meat or poultry, cut off any visible fat  · Use less fat and oil  Try baking foods instead of frying them  Add less fat, such as margarine, sour cream, regular salad dressing and mayonnaise to foods  Eat fewer high-fat foods  Some examples of high-fat foods include french fries, doughnuts, ice cream, and cakes  · Eat fewer sweets  Limit foods and drinks that are high in sugar  This includes candy, cookies, regular soda, and sweetened drinks  Exercise:  Exercise at least 30 minutes per day on most days of the week  Some examples of exercise include walking, biking, dancing, and swimming  You can also fit in more physical activity by taking the stairs instead of the elevator or parking farther away from stores  Ask your healthcare provider about the best exercise plan for you  © Copyright [address] 2018 Information is for End User's use only and may not be sold, redistributed or otherwise used for commercial purposes   All illustrations and images included in CareNotes® are the copyrighted property of A D [employer] M , [employer]  or [address] [place] [address]

## 2021-09-01 NOTE — PROGRESS NOTES
Assessment/Plan:    Hyperlipidemia  WATCHING CHOLESTEROL INTAKE  COMPLIANT WITH MEDICATION  NO CONCERNS    - CONTINUE TO MONITOR DIETARY CHOL INTAKE  - CONTINUE CURRENT MEDICATION  - ENCOURAGED PHYSICAL ACTIVITY        Generalized osteoarthritis of multiple sites  STABLE  DENIES ANY JOINT SWELLING OR REDNESS  JOINT STIFFNESS PRESENT  PAIN MANAGEMENT ADEQUATE    - CONTINUE CURRENT MANAGEMENT  - MEDICATION AS DIRECTED  - CALL / RETURN IF SYMPTOMS WORSEN      Hypertension  STABLE  DENIES ANY CP, SOB, PALPITATIONS, OR HEADACHE  NOTES NO WATER RETENTION  COMPLIANT WITH MEDICATION  NO CONCERNS    - CONTINUE CURRENT TREATMENT PLAN  - MONITOR DIETARY SODIUM INTAKE  - ENCOURAGE PHYSICAL ACTIVITY  - RV 6 MONTHS      Mild intermittent asthma without complication  STABLE    GERD (gastroesophageal reflux disease)  STABLE  DENIES ANY CP, INDIGESTION, OR COUGH  NOTES NO MELENA OR HEMATOCHEZIA  NO HEMATEMESIS  COMPLIANT WITH MEDICATION  NO CONCERNS    - CONTINUE CURRENT TREATMENT PLAN  - MEDICATION AS PRESCRIBED  - AVOID CAFFEINE, ALCOHOL OR SMOKING         Diagnoses and all orders for this visit:    Mixed hyperlipidemia    Generalized osteoarthritis of multiple sites    Essential hypertension  -     POCT ECG    Mild intermittent asthma without complication    Gastroesophageal reflux disease without esophagitis    Hyperplasia of prostate    Screening for hypothyroidism    Colon cancer screening  -     Cancel: POCT hemoccult screening    Encounter for prostate cancer screening    Disturbance of sleep    Medicare annual wellness visit, subsequent    Other orders  -     Discontinue: zolpidem (AMBIEN CR) 12 5 MG CR tablet  -     zolpidem (AMBIEN CR) 12 5 MG CR tablet  -     multivitamin (THERAGRAN) TABS; Take 1 tablet by mouth daily          Patient Instructions     DISCUSSED HEALTH MAINTENENCE ISSUES  BW WILL BE REVIEWED  ENCOURAGED HEALTHY DIET AND EXERCISE  COLONOSCOPY WILL BE ORDERED  RV [address] 1 YEAR  RV SOONER IF THERE ARE ANY CONCERNS    Recent Results (from the past 672 hour(s))   CBC and differential    Collection Time: 08/16/21 10:59 AM   Result Value Ref Range    White Blood Cell Count 5 0 3 8 - 10 8 Thousand/uL    Red Blood Cell Count 4 49 4 20 - 5 80 Million/uL    Hemoglobin 14 1 13 2 - 17 1 g/dL    HCT 41 6 38 5 - 50 0 %    MCV 92 7 80 0 - 100 0 fL    MCH 31 4 27 0 - 33 0 pg    MCHC 33 9 32 0 - 36 0 g/dL    RDW 12 4 11 0 - 15 0 %    Platelet Count 494 057 - 400 Thousand/uL    SL AMB MPV 10 5 7 5 - 12 5 fL    Neutrophils (Absolute) 3,075 1,500 - 7,800 cells/uL    Lymphocytes (Absolute) 1,385 850 - 3,900 cells/uL    Monocytes (Absolute) 460 200 - 950 cells/uL    Eosinophils (Absolute) 60 15 - 500 cells/uL    Basophils ABS 20 0 - 200 cells/uL    Neutrophils 61 5 %    Lymphocytes 27 7 %    Monocytes 9 2 %    Eosinophils 1 2 %    Basophils PCT 0 4 %   Comprehensive metabolic panel    Collection Time: 08/16/21 10:59 AM   Result Value Ref Range    Glucose, Random 95 65 - 99 mg/dL    BUN 11 7 - 25 mg/dL    Creatinine 0 88 0 70 - 1 18 mg/dL    eGFR Non  85 > OR = 60 mL/min/1 73m2    eGFR  98 > OR = 60 mL/min/1 73m2    SL AMB BUN/CREATININE RATIO NOT APPLICABLE 6 - 22 (calc)    Sodium 136 135 - 146 mmol/L    Potassium 4 6 3 5 - 5 3 mmol/L    Chloride 95 (L) 98 - 110 mmol/L    CO2 32 20 - 32 mmol/L    Calcium 9 3 8 6 - 10 3 mg/dL    Protein, Total 6 2 6 1 - 8 1 g/dL    Albumin 4 3 3 6 - 5 1 g/dL    Globulin 1 9 1 9 - 3 7 g/dL (calc)    Albumin/Globulin Ratio 2 3 1 0 - 2 5 (calc)    TOTAL BILIRUBIN 0 7 0 2 - 1 2 mg/dL    Alkaline Phosphatase 75 35 - 144 U/L    AST 18 10 - 35 U/L    ALT 14 9 - 46 U/L   Lipid panel    Collection Time: 08/16/21 10:59 AM   Result Value Ref Range    Total Cholesterol 193 <200 mg/dL    HDL 47 > OR = 40 mg/dL    Triglycerides 117 <150 mg/dL    LDL Calculated 124 (H) mg/dL (calc)    Chol HDLC Ratio 4 1 <5 0 (calc)    Non-HDL Cholesterol 146 (H) <130 mg/dL (calc)   PSA Total (Reflex To Free)    Collection Time: 08/16/21 10:59 AM   Result Value Ref Range    Prostate Specific Antigen Total 0 7 < OR = 4 0 ng/mL   TSH, 3rd generation    Collection Time: 08/16/21 10:59 AM   Result Value Ref Range    TSH 1 42 0 40 - 4 50 mIU/L   Hepatitis C Ab W/Refl To HCV RNA, Qn, PCR    Collection Time: 08/16/21 10:59 AM   Result Value Ref Range    HEP C AB NON-REACTIVE NON-REACTIVE    Signal to Cut-Off 0 02 <1 00   Uric acid    Collection Time: 08/16/21 10:59 AM   Result Value Ref Range    Uric Acid 6 8 4 0 - 8 0 mg/dL       Medicare Preventive Visit Patient Instructions  Thank you for completing your Welcome to Medicare Visit or Medicare Annual Wellness Visit today  Your next wellness visit will be due in one year (9/2/2022)  The screening/preventive services that you may require over the next 5-10 years are detailed below  Some tests may not apply to you based off risk factors and/or age  Screening tests ordered at today's visit but not completed yet may show as past due  Also, please note that scanned in results may not display below  Preventive Screenings:  Service Recommendations Previous Testing/Comments   Colorectal Cancer Screening  · Colonoscopy    · Fecal Occult Blood Test (FOBT)/Fecal Immunochemical Test (FIT)  · Fecal DNA/Cologuard Test  · Flexible Sigmoidoscopy Age: 54-65 years old   Colonoscopy: every 10 years (May be performed more frequently if at higher risk)  OR  FOBT/FIT: every 1 year  OR  Cologuard: every 3 years  OR  Sigmoidoscopy: every 5 years  Screening may be recommended earlier than age 48 if at higher risk for colorectal cancer  Also, an individualized decision between you and your healthcare provider will decide whether screening between the ages of 74-80 would be appropriate   Colonoscopy: 01/02/2020  FOBT/FIT: Not on file  Cologuard: Not on file  Sigmoidoscopy: Not on file    Screening Current     Prostate Cancer Screening Individualized decision between patient and health care provider in men between ages of 53-78   Medicare will cover every 12 months beginning on the day after your 50th birthday PSA: 0 7 ng/mL     Screening Current     Hepatitis C Screening Once for adults born between 1945 and 1965  More frequently in patients at high risk for Hepatitis C Hep C Antibody: 08/16/2021    Screening Current   Diabetes Screening 1-2 times per year if you're at risk for diabetes or have pre-diabetes Fasting glucose: No results in last 5 years   A1C: No results in last 5 years    Screening Current   Cholesterol Screening Once every 5 years if you don't have a lipid disorder  May order more often based on risk factors  Lipid panel: 08/16/2021    Screening Not Indicated  History Lipid Disorder      Other Preventive Screenings Covered by Medicare:  1  Abdominal Aortic Aneurysm (AAA) Screening: covered once if your at risk  You're considered to be at risk if you have a family history of AAA or a male between the age of 73-68 who smoking at least 100 cigarettes in your lifetime  2  Lung Cancer Screening: covers low dose CT scan once per year if you meet all of the following conditions: (1) Age 50-69; (2) No signs or symptoms of lung cancer; (3) Current smoker or have quit smoking within the last 15 years; (4) You have a tobacco smoking history of at least 30 pack years (packs per day x number of years you smoked); (5) You get a written order from a healthcare provider  3  Glaucoma Screening: covered annually if you're considered high risk: (1) You have diabetes OR (2) Family history of glaucoma OR (3)  aged 48 and older OR (3)  American aged 72 and older  3  Osteoporosis Screening: covered every 2 years if you meet one of the following conditions: (1) Have a vertebral abnormality; (2) On glucocorticoid therapy for more than 3 months; (3) Have primary hyperparathyroidism; (4) On osteoporosis medications and need to assess response to drug therapy    5  HIV Screening: covered annually if you're between the age of 15-65  Also covered annually if you are younger than 13 and older than 72 with risk factors for HIV infection  For pregnant patients, it is covered up to 3 times per pregnancy  Immunizations:  Immunization Recommendations   Influenza Vaccine Annual influenza vaccination during flu season is recommended for all persons aged >= 6 months who do not have contraindications   Pneumococcal Vaccine (Prevnar and Pneumovax)  * Prevnar = PCV13  * Pneumovax = PPSV23 Adults 25-60 years old: 1-3 doses may be recommended based on certain risk factors  Adults 72 years old: Prevnar (PCV13) vaccine recommended followed by Pneumovax (PPSV23) vaccine  If already received PPSV23 since turning 65, then PCV13 recommended at least one year after PPSV23 dose  Hepatitis B Vaccine 3 dose series if at intermediate or high risk (ex: diabetes, end stage renal disease, liver disease)   Tetanus (Td) Vaccine - COST NOT COVERED BY MEDICARE PART B Following completion of primary series, a booster dose should be given every 10 years to maintain immunity against tetanus  Td may also be given as tetanus wound prophylaxis  Tdap Vaccine - COST NOT COVERED BY MEDICARE PART B Recommended at least once for all adults  For pregnant patients, recommended with each pregnancy  Shingles Vaccine (Shingrix) - COST NOT COVERED BY MEDICARE PART B  2 shot series recommended in those aged 48 and above     Health Maintenance Due:      Topic Date Due    Colorectal Cancer Screening  01/02/2030    Hepatitis C Screening  Completed     Immunizations Due:      Topic Date Due    DTaP,Tdap,and Td Vaccines (1 - Tdap) 10/11/1967    Pneumococcal Vaccine: 65+ Years (1 of 2 - PPSV23) 10/20/2016    Influenza Vaccine (1) 09/01/2021     Advance Directives   What are advance directives? Advance directives are legal documents that state your wishes and plans for medical care   These plans are made ahead of time in case you lose your ability to make decisions for yourself  Advance directives can apply to any medical decision, such as the treatments you want, and if you want to donate organs  What are the types of advance directives? There are many types of advance directives, and each state has rules about how to use them  You may choose a combination of any of the following:  · Living will: This is a written record of the treatment you want  You can also choose which treatments you do not want, which to limit, and which to stop at a certain time  This includes surgery, medicine, IV fluid, and tube feedings  · Durable power of  for healthcare [place] [facility]): This is a written record that states who you want to make healthcare choices for you when you are unable to make them for yourself  This person, called a proxy, is usually a family member or a friend  You may choose more than 1 proxy  · Do not resuscitate (DNR) order:  A DNR order is used in case your heart stops beating or you stop breathing  It is a request not to have certain forms of treatment, such as CPR  A DNR order may be included in other types of advance directives  · Medical directive: This covers the care that you want if you are in a coma, near death, or unable to make decisions for yourself  You can list the treatments you want for each condition  Treatment may include pain medicine, surgery, blood transfusions, dialysis, IV or tube feedings, and a ventilator (breathing machine)  · Values history: This document has questions about your views, beliefs, and how you feel and think about life  This information can help others choose the care that you would choose  Why are advance directives important? An advance directive helps you control your care  Although spoken wishes may be used, it is better to have your wishes written down  Spoken wishes can be misunderstood, or not followed  Treatments may be given even if you do not want them   An advance directive may make it easier for your family to make difficult choices about your care  Weight Management   Why it is important to manage your weight:  Being overweight increases your risk of health conditions such as heart disease, high blood pressure, type 2 diabetes, and certain types of cancer  It can also increase your risk for osteoarthritis, sleep apnea, and other respiratory problems  Aim for a slow, steady weight loss  Even a small amount of weight loss can lower your risk of health problems  How to lose weight safely:  A safe and healthy way to lose weight is to eat fewer calories and get regular exercise  You can lose up about 1 pound a week by decreasing the number of calories you eat by 500 calories each day  Healthy meal plan for weight management:  A healthy meal plan includes a variety of foods, contains fewer calories, and helps you stay healthy  A healthy meal plan includes the following:  · Eat whole-grain foods more often  A healthy meal plan should contain fiber  Fiber is the part of grains, fruits, and vegetables that is not broken down by your body  Whole-grain foods are healthy and provide extra fiber in your diet  Some examples of whole-grain foods are whole-wheat breads and pastas, oatmeal, brown rice, and bulgur  · Eat a variety of vegetables every day  Include dark, leafy greens such as spinach, kale, gabriella greens, and mustard greens  Eat yellow and orange vegetables such as carrots, sweet potatoes, and winter squash  · Eat a variety of fruits every day  Choose fresh or canned fruit (canned in its own juice or light syrup) instead of juice  Fruit juice has very little or no fiber  · Eat low-fat dairy foods  Drink fat-free (skim) milk or 1% milk  Eat fat-free yogurt and low-fat cottage cheese  Try low-fat cheeses such as mozzarella and other reduced-fat cheeses  · Choose meat and other protein foods that are low in fat    Choose beans or other legumes such as split peas or lentils  Choose fish, skinless poultry (chicken or turkey), or lean cuts of red meat (beef or pork)  Before you cook meat or poultry, cut off any visible fat  · Use less fat and oil  Try baking foods instead of frying them  Add less fat, such as margarine, sour cream, regular salad dressing and mayonnaise to foods  Eat fewer high-fat foods  Some examples of high-fat foods include french fries, doughnuts, ice cream, and cakes  · Eat fewer sweets  Limit foods and drinks that are high in sugar  This includes candy, cookies, regular soda, and sweetened drinks  Exercise:  Exercise at least 30 minutes per day on most days of the week  Some examples of exercise include walking, biking, dancing, and swimming  You can also fit in more physical activity by taking the stairs instead of the elevator or parking farther away from stores  Ask your healthcare provider about the best exercise plan for you  © Copyright [employer] 2018 Information is for End User's use only and may not be sold, redistributed or otherwise used for commercial purposes  All illustrations and images included in CareNotes® are the copyrighted property of [employer]  or [employer]      Return in about 6 months (around 3/1/2022) for Recheck  Subjective:      Patient ID: Grecia Coker is a 77 y o  male  Chief Complaint   Patient presents with   [facility] Medicare Wellness Visit       PATIENT RETURNS FOR ANNUAL WELLNESS EXAM AND ANNUAL EVALUATION OF PATIENT'S MEDICAL ISSUES    INDIVIDUAL MEDICAL ISSUES WITH THEIR CURRENT STATUS, ASSESSMENT AND PLANS ARE LISTED ABOVE          The following portions of the patient's history were reviewed and updated as appropriate: allergies, current medications, past family history, past medical history, past social history, past surgical history and problem list     Review of Systems   Constitutional: Negative for chills, fatigue and fever     HENT: Negative for congestion, ear discharge, ear pain, mouth sores, postnasal drip, sore throat and trouble swallowing  Eyes: Negative for pain, discharge and visual disturbance  Respiratory: Negative for cough, shortness of breath and wheezing  Cardiovascular: Negative for chest pain, palpitations and leg swelling  Gastrointestinal: Negative for abdominal distention, abdominal pain, blood in stool, diarrhea and nausea  Endocrine: Negative for polydipsia, polyphagia and polyuria  Genitourinary: Negative for dysuria, frequency, hematuria and urgency  Musculoskeletal: Negative for arthralgias, gait problem and joint swelling  Skin: Negative for pallor and rash  Neurological: Negative for dizziness, syncope, speech difficulty, weakness, light-headedness, numbness and headaches  Hematological: Negative for adenopathy  Psychiatric/Behavioral: Negative for behavioral problems, confusion and sleep disturbance  The patient is not nervous/anxious  Current Outpatient Medications   Medication Sig Dispense Refill    mometasone (,ASMANEX TWISTHALER,) 110 MCG/INH AEPB inhaler Inhale 1 puff 2 (two) times a day Rinse mouth after use  3 Inhaler 3    multivitamin (THERAGRAN) TABS Take 1 tablet by mouth daily      nabumetone (RELAFEN) 750 mg tablet Take 1 tablet (750 mg total) by mouth 2 (two) times a day 180 tablet 1    tamsulosin (FLOMAX) 0 4 mg TAKE 2 CAPSULES(0 8 MG) BY MOUTH DAILY AT BEDTIME 180 capsule 1    zolpidem (AMBIEN CR) 12 5 MG CR tablet        No current facility-administered medications for this visit  Objective:    /56 (BP Location: Left arm, Patient Position: Sitting, Cuff Size: Large)   Pulse 72   Temp (!) 97 °F (36 1 °C) (Temporal)   Resp 12   Ht 6' (1 829 m)   Wt 88 kg (194 lb)   SpO2 98%   BMI 26 31 kg/m²        Physical Exam  Constitutional:       Appearance: Normal appearance  He is well-developed and normal weight  HENT:      Head: Normocephalic and atraumatic        Right Ear: External ear normal  Left Ear: External ear normal       Nose: Nose normal       Mouth/Throat:      Mouth: Mucous membranes are moist    Eyes:      General:         Right eye: No discharge  Left eye: No discharge  Conjunctiva/sclera: Conjunctivae normal       Pupils: Pupils are equal, round, and reactive to light  Neck:      Thyroid: No thyromegaly  Vascular: No JVD  Cardiovascular:      Rate and Rhythm: Normal rate and regular rhythm  Heart sounds: Normal heart sounds  No murmur heard  Pulmonary:      Effort: Pulmonary effort is normal       Breath sounds: Normal breath sounds  No wheezing or rales  Abdominal:      General: Bowel sounds are normal       Palpations: Abdomen is soft  There is no mass  Tenderness: There is no abdominal tenderness  There is no guarding or rebound  Genitourinary:     Penis: Normal        Prostate: Normal       Rectum: Normal  Guaiac result negative  Musculoskeletal:         General: No tenderness or deformity  Normal range of motion  Cervical back: Neck supple  Comments: moderate DJD CHANGES     Lymphadenopathy:      Cervical: No cervical adenopathy  Skin:     General: Skin is warm and dry  Findings: No erythema or rash  Neurological:      Mental Status: He is alert and oriented to person, place, and time  Cranial Nerves: No cranial nerve deficit  Motor: No abnormal muscle tone  Coordination: Coordination normal       Deep Tendon Reflexes: Reflexes are normal and symmetric  Psychiatric:         Behavior: Behavior normal          Thought Content:  Thought content normal          Judgment: Judgment normal                 Dann Araujo MD

## 2021-09-01 NOTE — PROGRESS NOTES
Assessment and Plan:     Problem List Items Addressed This Visit        Digestive    RESOLVED: GERD (gastroesophageal reflux disease)     STABLE  DENIES ANY CP, INDIGESTION, OR COUGH  NOTES NO MELENA OR HEMATOCHEZIA  NO HEMATEMESIS  COMPLIANT WITH MEDICATION  NO CONCERNS    - CONTINUE CURRENT TREATMENT PLAN  - MEDICATION AS PRESCRIBED  - AVOID CAFFEINE, ALCOHOL OR SMOKING              Respiratory    Mild intermittent asthma without complication     STABLE            Cardiovascular and Mediastinum    Hypertension     STABLE  DENIES ANY CP, SOB, PALPITATIONS, OR HEADACHE  NOTES NO WATER RETENTION  COMPLIANT WITH MEDICATION  NO CONCERNS    - CONTINUE CURRENT TREATMENT PLAN  - MONITOR DIETARY SODIUM INTAKE  - ENCOURAGE PHYSICAL ACTIVITY  - RV 6 MONTHS           Relevant Orders    POCT ECG       Musculoskeletal and Integument    Generalized osteoarthritis of multiple sites     STABLE  DENIES ANY JOINT SWELLING OR REDNESS  JOINT STIFFNESS PRESENT  PAIN MANAGEMENT ADEQUATE    - CONTINUE CURRENT MANAGEMENT  - MEDICATION AS DIRECTED  - CALL / RETURN IF SYMPTOMS WORSEN              Genitourinary    Hyperplasia of prostate       Other    Disturbance of sleep    Hyperlipidemia - Primary     WATCHING CHOLESTEROL INTAKE  COMPLIANT WITH MEDICATION  NO CONCERNS    - CONTINUE TO MONITOR DIETARY CHOL INTAKE  - CONTINUE CURRENT MEDICATION  - ENCOURAGED PHYSICAL ACTIVITY             Encounter for prostate cancer screening    Colon cancer screening    Screening for hypothyroidism    Medicare annual wellness visit, subsequent    Need for influenza vaccination    Relevant Orders    influenza vaccine, high-dose, PF 0 7 mL (FLUZONE HIGH-DOSE) (Completed)           Preventive health issues were discussed with patient, and age appropriate screening tests were ordered as noted in patient's After Visit Summary    Personalized health advice and appropriate referrals for health education or preventive services given if needed, as noted in patient's After Visit Summary       History of Present Illness:     Patient presents for Medicare Annual Wellness visit    Patient Care Team:  Montana Mccarthy MD as PCP - General (Family Medicine)  Alen Mcpherson MD (Urology)     Problem List:     Patient Active Problem List   Diagnosis    Disturbance of sleep    Generalized osteoarthritis of multiple sites    Gouty arthropathy    Hyperlipidemia    Hyperplasia of prostate    Hypertension    Mild intermittent asthma without complication    Sensorineural hearing loss    Encounter for prostate cancer screening    Colon cancer screening    Screening for hypothyroidism    Medicare annual wellness visit, subsequent    Need for influenza vaccination      Past Medical and Surgical History:     Past Medical History:   Diagnosis Date    Cataract     resolved: 2016    Glaucoma     Sciatica, right side     last assessed: 2016     Past Surgical History:   Procedure Laterality Date    TONSILLECTOMY        Family History:     Family History   Problem Relation Age of Onset    Coronary artery disease Mother     Cancer Mother     Coronary artery disease Father     Substance Abuse Daughter     Mental illness Neg Hx       Social History:     Social History     Socioeconomic History    Marital status: /Civil Union     Spouse name: None    Number of children: None    Years of education: None    Highest education level: None   Occupational History    None   Tobacco Use    Smoking status: Former Smoker     Quit date:      Years since quittin 6    Smokeless tobacco: Never Used   Vaping Use    Vaping Use: Never used   Substance and Sexual Activity    Alcohol use: No    Drug use: No    Sexual activity: None   Other Topics Concern    None   Social History Narrative    Denied: history of active advance directive    Daily caffeine consumption, 1 serving a day    Dental care, regularly    No advance directives    Primary spoken language English (as per allscripts)     Social Determinants of Health     Financial Resource Strain:     Difficulty of Paying Living Expenses:    Food Insecurity:     Worried About Running Out of Food in the Last Year:     [address] in the Last Year:    Transportation Needs:     Lack of Transportation (Medical):  Lack of Transportation (Non-Medical):    Physical Activity:     Days of Exercise per Week:     Minutes of Exercise per Session:    Stress:     Feeling of Stress :    Social Connections:     Frequency of Communication with Friends and Family:     Frequency of Social Gatherings with Friends and Family:     Attends [employer] Services:     Active Member of Clubs or Organizations:     Attends Club or Organization Meetings:     Marital Status:    Intimate Partner Violence:     Fear of Current or Ex-Partner:     Emotionally Abused:     Physically Abused:     Sexually Abused:       Medications and Allergies:     Current Outpatient Medications   Medication Sig Dispense Refill    mometasone (,ASMANEX TWISTHALER,) 110 MCG/INH AEPB inhaler Inhale 1 puff 2 (two) times a day Rinse mouth after use  3 Inhaler 3    multivitamin (THERAGRAN) TABS Take 1 tablet by mouth daily      nabumetone (RELAFEN) 750 mg tablet Take 1 tablet (750 mg total) by mouth 2 (two) times a day 180 tablet 1    tamsulosin (FLOMAX) 0 4 mg TAKE 2 CAPSULES(0 8 MG) BY MOUTH DAILY AT BEDTIME 180 capsule 1    zolpidem (AMBIEN CR) 12 5 MG CR tablet        No current facility-administered medications for this visit       Allergies   Allergen Reactions    Pollen Extract Nasal Congestion      Immunizations:     Immunization History   Administered Date(s) Administered    DT (pediatric) 06/01/1999    INFLUENZA 11/01/2019    Influenza Split High Dose Preservative Free IM 10/01/2014, 08/25/2016, 09/01/2017, 10/30/2019    Influenza, high dose seasonal 0 7 mL 09/07/2018, 09/10/2020, 09/01/2021    Influenza, seasonal, injectable 1945, 10/13/2010, 09/12/2013    Pneumococcal Conjugate 13-Valent 08/25/2016    SARS-CoV-2 / COVID-19 mRNA IM (Moderna) 02/19/2021, 03/18/2021    Td (adult), adsorbed 05/20/2010      Health Maintenance:         Topic Date Due    Colorectal Cancer Screening  01/02/2030    Hepatitis C Screening  Completed         Topic Date Due    DTaP,Tdap,and Td Vaccines (1 - Tdap) 10/11/1967    Pneumococcal Vaccine: 65+ Years (1 of 2 - PPSV23) 10/20/2016      Medicare Health Risk Assessment:     /56 (BP Location: Left arm, Patient Position: Sitting, Cuff Size: Large)   Pulse 72   Temp (!) 97 °F (36 1 °C) (Temporal)   Resp 12   Ht 6' (1 829 m)   Wt 88 kg (194 lb)   SpO2 98%   BMI 26 31 kg/m²      Dixon Martinez is here for his Subsequent Wellness visit  Health Risk Assessment:   Patient rates overall health as very good  Patient feels that their physical health rating is same  Patient is satisfied with their life  Eyesight was rated as same  Hearing was rated as same  Patient feels that their emotional and mental health rating is same  Patients states they are never, rarely angry  Patient states they are often unusually tired/fatigued  Pain experienced in the last 7 days has been none  Patient states that he has experienced no weight loss or gain in last 6 months  Depression Screening:   PHQ-2 Score: 0      Fall Risk Screening: In the past year, patient has experienced: no history of falling in past year      Home Safety:  Patient does not have trouble with stairs inside or outside of their home  Patient has working smoke alarms and has working carbon monoxide detector  Home safety hazards include: none  Nutrition:   Current diet is Regular  Medications:   Patient is currently taking over-the-counter supplements  OTC medications include: see medication list  Patient is able to manage medications       Activities of Daily Living (ADLs)/Instrumental Activities of Daily Living (IADLs):   Walk and transfer into and out of bed and chair?: Yes  Dress and groom yourself?: Yes    Bathe or shower yourself?: Yes    Feed yourself? Yes  Do your laundry/housekeeping?: Yes  Manage your money, pay your bills and track your expenses?: Yes  Make your own meals?: Yes    Do your own shopping?: Yes    Previous Hospitalizations:   Any hospitalizations or ED visits within the last 12 months?: No      Cognitive Screening:   Provider or family/friend/caregiver concerned regarding cognition?: No    PREVENTIVE SCREENINGS      Cardiovascular Screening:    General: Screening Not Indicated and History Lipid Disorder      Diabetes Screening:     General: Screening Current      Colorectal Cancer Screening:     General: Screening Current      Prostate Cancer Screening:    General: Screening Current      Abdominal Aortic Aneurysm (AAA) Screening:    Risk factors include: age between 73-69 yo and tobacco use        General: Screening Not Indicated      Lung Cancer Screening:     General: Screening Not Indicated      Hepatitis C Screening:    General: Screening Current    Screening, Brief Intervention, and Referral to Treatment (SBIRT)    Screening  Typical number of drinks in a day: 0  Typical number of drinks in a week: 0  Interpretation: Low risk drinking behavior      Single Item Drug Screening:  How often have you used an illegal drug (including marijuana) or a prescription medication for non-medical reasons in the past year? never    Single Item Drug Screen Score: 0  Interpretation: Negative screen for possible drug use disorder      Zoya Lopez MD

## 2021-09-01 NOTE — LETTER
JOHANNE HAMLIN      Current Outpatient Medications:     mometasone (,ASMANEX TWISTHALER,) 110 MCG/INH AEPB inhaler, Inhale 1 puff 2 (two) times a day Rinse mouth after use , Disp: 3 Inhaler, Rfl: 3    multivitamin (THERAGRAN) TABS, Take 1 tablet by mouth daily, Disp: , Rfl:     nabumetone (RELAFEN) 750 mg tablet, Take 1 tablet (750 mg total) by mouth 2 (two) times a day, Disp: 180 tablet, Rfl: 1    tamsulosin (FLOMAX) 0 4 mg, TAKE 2 CAPSULES(0 8 MG) BY MOUTH DAILY AT BEDTIME, Disp: 180 capsule, Rfl: 1    zolpidem (AMBIEN CR) 12 5 MG CR tablet, , Disp: , Rfl:     loratadine-pseudoephedrine (CLARITIN-D 12-HOUR) 5-120 mg per tablet, Take 1 tablet by mouth 2 (two) times a day (Patient not taking: Reported on 9/1/2021), Disp: 180 tablet, Rfl: 1    Recent Results (from the past 672 hour(s))   CBC and differential    Collection Time: 08/16/21 10:59 AM   Result Value Ref Range    White Blood Cell Count 5 0 3 8 - 10 8 Thousand/uL    Red Blood Cell Count 4 49 4 20 - 5 80 Million/uL    Hemoglobin 14 1 13 2 - 17 1 g/dL    HCT 41 6 38 5 - 50 0 %    MCV 92 7 80 0 - 100 0 fL    MCH 31 4 27 0 - 33 0 pg    MCHC 33 9 32 0 - 36 0 g/dL    RDW 12 4 11 0 - 15 0 %    Platelet Count 454 586 - 400 Thousand/uL    SL AMB MPV 10 5 7 5 - 12 5 fL    Neutrophils (Absolute) 3,075 1,500 - 7,800 cells/uL    Lymphocytes (Absolute) 1,385 850 - 3,900 cells/uL    Monocytes (Absolute) 460 200 - 950 cells/uL    Eosinophils (Absolute) 60 15 - 500 cells/uL    Basophils ABS 20 0 - 200 cells/uL    Neutrophils 61 5 %    Lymphocytes 27 7 %    Monocytes 9 2 %    Eosinophils 1 2 %    Basophils PCT 0 4 %   Comprehensive metabolic panel    Collection Time: 08/16/21 10:59 AM   Result Value Ref Range    Glucose, Random 95 65 - 99 mg/dL    BUN 11 7 - 25 mg/dL    Creatinine 0 88 0 70 - 1 18 mg/dL    eGFR Non  85 > OR = 60 mL/min/1 73m2    eGFR  98 > OR = 60 mL/min/1 73m2    SL AMB BUN/CREATININE RATIO NOT APPLICABLE 6 - 22 (calc) Sodium 136 135 - 146 mmol/L    Potassium 4 6 3 5 - 5 3 mmol/L    Chloride 95 (L) 98 - 110 mmol/L    CO2 32 20 - 32 mmol/L    Calcium 9 3 8 6 - 10 3 mg/dL    Protein, Total 6 2 6 1 - 8 1 g/dL    Albumin 4 3 3 6 - 5 1 g/dL    Globulin 1 9 1 9 - 3 7 g/dL (calc)    Albumin/Globulin Ratio 2 3 1 0 - 2 5 (calc)    TOTAL BILIRUBIN 0 7 0 2 - 1 2 mg/dL    Alkaline Phosphatase 75 35 - 144 U/L    AST 18 10 - 35 U/L    ALT 14 9 - 46 U/L   Lipid panel    Collection Time: 08/16/21 10:59 AM   Result Value Ref Range    Total Cholesterol 193 <200 mg/dL    HDL 47 > OR = 40 mg/dL    Triglycerides 117 <150 mg/dL    LDL Calculated 124 (H) mg/dL (calc)    Chol HDLC Ratio 4 1 <5 0 (calc)    Non-HDL Cholesterol 146 (H) <130 mg/dL (calc)   PSA Total (Reflex To Free)    Collection Time: 08/16/21 10:59 AM   Result Value Ref Range    Prostate Specific Antigen Total 0 7 < OR = 4 0 ng/mL   TSH, 3rd generation    Collection Time: 08/16/21 10:59 AM   Result Value Ref Range    TSH 1 42 0 40 - 4 50 mIU/L   Hepatitis C Ab W/Refl To HCV RNA, Qn, PCR    Collection Time: 08/16/21 10:59 AM   Result Value Ref Range    HEP C AB NON-REACTIVE NON-REACTIVE    Signal to Cut-Off 0 02 <1 00   Uric acid    Collection Time: 08/16/21 10:59 AM   Result Value Ref Range    Uric Acid 6 8 4 0 - 8 0 mg/dL

## 2021-09-07 DIAGNOSIS — J45.20 MILD INTERMITTENT ASTHMA WITHOUT COMPLICATION: ICD-10-CM

## 2021-09-17 DIAGNOSIS — G47.9 DISTURBANCE OF SLEEP: Primary | ICD-10-CM

## 2021-09-23 DIAGNOSIS — G47.9 DISTURBANCE OF SLEEP: Primary | ICD-10-CM

## 2021-09-23 RX ORDER — ZOLPIDEM TARTRATE 12.5 MG/1
TABLET, FILM COATED, EXTENDED RELEASE ORAL
Qty: 30 TABLET | Refills: 0 | Status: SHIPPED | OUTPATIENT
Start: 2021-09-23 | End: 2022-01-17 | Stop reason: SDUPTHER

## 2021-09-24 RX ORDER — ZOLPIDEM TARTRATE 12.5 MG/1
12.5 TABLET, FILM COATED, EXTENDED RELEASE ORAL
Qty: 30 TABLET | Refills: 0 | Status: SHIPPED | OUTPATIENT
Start: 2021-09-24 | End: 2021-10-26 | Stop reason: SDUPTHER

## 2021-10-26 DIAGNOSIS — G47.9 DISTURBANCE OF SLEEP: ICD-10-CM

## 2021-10-26 RX ORDER — ZOLPIDEM TARTRATE 12.5 MG/1
12.5 TABLET, FILM COATED, EXTENDED RELEASE ORAL
Qty: 30 TABLET | Refills: 0 | Status: SHIPPED | OUTPATIENT
Start: 2021-10-26 | End: 2021-11-16 | Stop reason: SDUPTHER

## 2021-10-29 ENCOUNTER — OFFICE VISIT (OUTPATIENT)
Dept: URGENT CARE | Facility: CLINIC | Age: 76
End: 2021-10-29
Payer: COMMERCIAL

## 2021-10-29 VITALS
RESPIRATION RATE: 14 BRPM | DIASTOLIC BLOOD PRESSURE: 62 MMHG | TEMPERATURE: 97 F | SYSTOLIC BLOOD PRESSURE: 140 MMHG | OXYGEN SATURATION: 100 % | HEART RATE: 66 BPM

## 2021-10-29 DIAGNOSIS — L85.3 DRY SKIN DERMATITIS: Primary | ICD-10-CM

## 2021-10-29 PROCEDURE — 99213 OFFICE O/P EST LOW 20 MIN: CPT | Performed by: PHYSICIAN ASSISTANT

## 2021-10-29 RX ORDER — BETAMETHASONE DIPROPIONATE 0.5 MG/G
CREAM TOPICAL 2 TIMES DAILY
Qty: 30 G | Refills: 0 | Status: SHIPPED | OUTPATIENT
Start: 2021-10-29 | End: 2022-01-05

## 2021-10-29 RX ORDER — METHYLPREDNISOLONE 4 MG/1
TABLET ORAL
Qty: 1 EACH | Refills: 0 | Status: SHIPPED | OUTPATIENT
Start: 2021-10-29 | End: 2022-01-05 | Stop reason: ALTCHOICE

## 2021-10-29 NOTE — PROGRESS NOTES
[facility]        NAME: Nazanin Zee is a 77 y o  male  : 1945    MRN: [id number]  DATE: 2021  TIME: 12:16 PM    Assessment and Plan   Dry skin dermatitis [L85 3]  1  Dry skin dermatitis  DISCONTINUED: methylPREDNISolone 4 MG tablet therapy pack    DISCONTINUED: betamethasone dipropionate (DIPROSONE) 0 05 % cream         Patient Instructions   Dry Skin Dermatitis: The patients presentation is consistent with dry skin dermatitis  Will treat with Medrol Dose Andi for the itching  Take with meals  Will also prescribe betamethasone topical cream to be applied  You should continue to use Eucerin body cream and topical moisturizers after you get out of the shower  Stay very well hydrated  Follow up with Jyothi Hunt or a Dermatologist if your symptoms persist or worsen  Follow up with PCP in 3-5 days  Proceed to  ER if symptoms worsen  Chief Complaint     Chief Complaint   Patient presents with    Rash     pt presents with itching on arms, legs, torso, states it started as a rash and now itching all over; Started 7-10 dyas ago  History of Present Illness       The patient is a 78-year-old male who presents today for a rash over the arms, legs and trunk x 10 days  He states that the rash is highly pruritic  The rash began diffusely  He has been using OTC eczema cream and eucerin with no relief  There is no pain  No drainage  No new medications  No new lotions, detergents, body wash  No recent travel or sick contacts  Review of Systems   Review of Systems   Constitutional: Negative for activity change, appetite change, chills, diaphoresis, fatigue and fever  HENT: Negative for facial swelling, sore throat and trouble swallowing  Respiratory: Negative for chest tightness, shortness of breath, wheezing and stridor  Cardiovascular: Negative for chest pain and palpitations  Skin: Positive for rash     Allergic/Immunologic: Negative for environmental allergies, food allergies and immunocompromised state  Neurological: Negative for dizziness and light-headedness  Hematological: Negative for adenopathy  Does not bruise/bleed easily  Current Medications       Current Outpatient Medications:     multivitamin (THERAGRAN) TABS, Take 1 tablet by mouth daily, Disp: , Rfl:     tamsulosin (FLOMAX) 0 4 mg, TAKE 2 CAPSULES(0 8 MG) BY MOUTH DAILY AT BEDTIME, Disp: 180 capsule, Rfl: 1    finasteride (PROSCAR) 5 mg tablet, Take 5 mg by mouth daily, Disp: , Rfl:     mometasone (Asmanex Twisthaler) 110 MCG/INH AEPB inhaler, Inhale 1 puff 2 (two) times a day Rinse mouth after use , Disp: 3 each, Rfl: 3    nabumetone (RELAFEN) 750 mg tablet, TAKE 1 TABLET(750 MG) BY MOUTH TWICE DAILY, Disp: 180 tablet, Rfl: 1    predniSONE 20 mg tablet, 2 PO QD X 4 DAYS, THEN 1 PO QD X 4 DAYS, THEN 1/2 PO QD X 4 DAYS, Disp: 14 tablet, Rfl: 0    zolpidem (AMBIEN CR) 12 5 MG CR tablet, Take 1 tablet (12 5 mg total) by mouth daily at bedtime as needed for sleep, Disp: 30 tablet, Rfl: 0    Current Allergies     Allergies as of 10/29/2021 - Reviewed 10/29/2021   Allergen Reaction Noted    Pollen extract Nasal Congestion 05/01/2021            The following portions of the patient's history were reviewed and updated as appropriate: allergies, current medications, past family history, past medical history, past social history, past surgical history and problem list      Past Medical History:   Diagnosis Date    Cataract     resolved: 7/20/2016    Glaucoma     Sciatica, right side     last assessed: 7/20/2016       Past Surgical History:   Procedure Laterality Date    TONSILLECTOMY         Family History   Problem Relation Age of Onset    Coronary artery disease Mother     Cancer Mother     Coronary artery disease Father     Substance Abuse Daughter     Mental illness Neg Hx          Medications have been verified          Objective   /62   Pulse 66   Temp (!) 97 °F (36 1 °C) Resp 14   SpO2 100%   No LMP for male patient  Physical Exam     Physical Exam  Vitals and nursing note reviewed  Constitutional:       General: He is not in acute distress  Appearance: He is well-developed  He is not diaphoretic  HENT:      Mouth/Throat:      Pharynx: Uvula midline  Cardiovascular:      Rate and Rhythm: Normal rate and regular rhythm  Pulses: Normal pulses  Heart sounds: Normal heart sounds, S1 normal and S2 normal  No murmur heard  Pulmonary:      Effort: Pulmonary effort is normal  No tachypnea, accessory muscle usage or respiratory distress  Breath sounds: Normal breath sounds  No stridor  No decreased breath sounds, wheezing, rhonchi or rales  Musculoskeletal:         General: Normal range of motion  Skin:     Capillary Refill: Capillary refill takes less than 2 seconds  Findings: Rash present  Comments: There is a scattered, erythematous/pink, maculopapular rash with overlying dry skin and excoriations diffusely

## 2021-11-16 DIAGNOSIS — G47.9 DISTURBANCE OF SLEEP: ICD-10-CM

## 2021-11-16 RX ORDER — ZOLPIDEM TARTRATE 12.5 MG/1
12.5 TABLET, FILM COATED, EXTENDED RELEASE ORAL
Qty: 30 TABLET | Refills: 0 | Status: SHIPPED | OUTPATIENT
Start: 2021-11-16 | End: 2021-12-21 | Stop reason: SDUPTHER

## 2021-12-08 DIAGNOSIS — J45.20 MILD INTERMITTENT ASTHMA WITHOUT COMPLICATION: ICD-10-CM

## 2021-12-12 DIAGNOSIS — M15.9 GENERALIZED OSTEOARTHRITIS OF MULTIPLE SITES: ICD-10-CM

## 2021-12-13 RX ORDER — NABUMETONE 750 MG/1
TABLET, FILM COATED ORAL
Qty: 180 TABLET | Refills: 1 | Status: SHIPPED | OUTPATIENT
Start: 2021-12-13 | End: 2022-06-07

## 2021-12-21 DIAGNOSIS — G47.9 DISTURBANCE OF SLEEP: ICD-10-CM

## 2021-12-21 RX ORDER — ZOLPIDEM TARTRATE 12.5 MG/1
12.5 TABLET, FILM COATED, EXTENDED RELEASE ORAL
Qty: 30 TABLET | Refills: 0 | Status: SHIPPED | OUTPATIENT
Start: 2021-12-21 | End: 2022-01-05 | Stop reason: SDUPTHER

## 2022-01-05 ENCOUNTER — OFFICE VISIT (OUTPATIENT)
Dept: FAMILY MEDICINE CLINIC | Facility: CLINIC | Age: 77
End: 2022-01-05
Payer: COMMERCIAL

## 2022-01-05 VITALS
SYSTOLIC BLOOD PRESSURE: 112 MMHG | BODY MASS INDEX: 26.82 KG/M2 | HEART RATE: 76 BPM | WEIGHT: 198 LBS | DIASTOLIC BLOOD PRESSURE: 64 MMHG | RESPIRATION RATE: 16 BRPM | HEIGHT: 72 IN | TEMPERATURE: 97.6 F | OXYGEN SATURATION: 98 %

## 2022-01-05 DIAGNOSIS — M54.50 CHRONIC MIDLINE LOW BACK PAIN, UNSPECIFIED WHETHER SCIATICA PRESENT: ICD-10-CM

## 2022-01-05 DIAGNOSIS — G89.29 CHRONIC MIDLINE LOW BACK PAIN, UNSPECIFIED WHETHER SCIATICA PRESENT: ICD-10-CM

## 2022-01-05 DIAGNOSIS — R21 RASH: Primary | ICD-10-CM

## 2022-01-05 PROCEDURE — 1036F TOBACCO NON-USER: CPT | Performed by: FAMILY MEDICINE

## 2022-01-05 PROCEDURE — 3078F DIAST BP <80 MM HG: CPT | Performed by: FAMILY MEDICINE

## 2022-01-05 PROCEDURE — 3074F SYST BP LT 130 MM HG: CPT | Performed by: FAMILY MEDICINE

## 2022-01-05 PROCEDURE — 1160F RVW MEDS BY RX/DR IN RCRD: CPT | Performed by: FAMILY MEDICINE

## 2022-01-05 PROCEDURE — 99214 OFFICE O/P EST MOD 30 MIN: CPT | Performed by: FAMILY MEDICINE

## 2022-01-05 PROCEDURE — 96372 THER/PROPH/DIAG INJ SC/IM: CPT

## 2022-01-05 RX ORDER — PREDNISONE 20 MG/1
TABLET ORAL
Qty: 14 TABLET | Refills: 0 | Status: SHIPPED | OUTPATIENT
Start: 2022-01-05 | End: 2022-03-01 | Stop reason: ALTCHOICE

## 2022-01-05 RX ORDER — DEXAMETHASONE SODIUM PHOSPHATE 4 MG/ML
4 INJECTION, SOLUTION INTRA-ARTICULAR; INTRALESIONAL; INTRAMUSCULAR; INTRAVENOUS; SOFT TISSUE ONCE
Status: COMPLETED | OUTPATIENT
Start: 2022-01-05 | End: 2022-01-05

## 2022-01-05 RX ORDER — FINASTERIDE 5 MG/1
5 TABLET, FILM COATED ORAL DAILY
COMMUNITY
Start: 2021-11-15

## 2022-01-05 RX ADMIN — DEXAMETHASONE SODIUM PHOSPHATE 4 MG: 4 INJECTION, SOLUTION INTRA-ARTICULAR; INTRALESIONAL; INTRAMUSCULAR; INTRAVENOUS; SOFT TISSUE at 14:37

## 2022-01-05 NOTE — PATIENT INSTRUCTIONS
REST  AVOID LIFTING OR PUSHING  PT / LUMBAR SPINE  MAY NEED TO CONSIDER MRI    TRIAL OF PREDNISONE AND EUCERIN FOR RASH  DERM CONSULT  FURTHER PLANS PENDING EVALUATION
101F

## 2022-01-17 DIAGNOSIS — G47.9 DISTURBANCE OF SLEEP: ICD-10-CM

## 2022-01-20 RX ORDER — ZOLPIDEM TARTRATE 12.5 MG/1
12.5 TABLET, FILM COATED, EXTENDED RELEASE ORAL
Qty: 30 TABLET | Refills: 0 | Status: SHIPPED | OUTPATIENT
Start: 2022-01-20 | End: 2022-02-09 | Stop reason: SDUPTHER

## 2022-01-21 NOTE — PATIENT INSTRUCTIONS
Dry Skin Dermatitis: The patients presentation is consistent with dry skin dermatitis  Will treat with Medrol Dose Andi for the itching  Take with meals  Will also prescribe betamethasone topical cream to be applied  You should continue to use Eucerin body cream and topical moisturizers after you get out of the shower  Stay very well hydrated  Follow up with Gali Aguilar or a Dermatologist if your symptoms persist or worsen

## 2022-02-07 ENCOUNTER — TELEPHONE (OUTPATIENT)
Dept: FAMILY MEDICINE CLINIC | Facility: CLINIC | Age: 77
End: 2022-02-07

## 2022-02-07 NOTE — TELEPHONE ENCOUNTER
Pipo's insurance is denying his Zolpidem Tartrate ER 12 5 Tablet    I did a PA and they still denied it  Needs to try and fail the following rx    Doxepin (3 mg or 6mg)  ramelteon  Temazepam (15 mg  30 mg)  Trazodone    I explained this to pipo  He stated he has only had the zolpidem  He changed insurance companies    He is ok to switch to any of the above, if you feel they will be just as effective      Send to [employer] in "[employer]

## 2022-02-08 NOTE — TELEPHONE ENCOUNTER
Spoke to [place] and informed him that Dr Rivera Lewis sent in a new rx for him    No further action required

## 2022-02-09 DIAGNOSIS — G47.9 DISTURBANCE OF SLEEP: ICD-10-CM

## 2022-02-09 RX ORDER — ZOLPIDEM TARTRATE 12.5 MG/1
12.5 TABLET, FILM COATED, EXTENDED RELEASE ORAL
Qty: 30 TABLET | Refills: 0 | Status: SHIPPED | OUTPATIENT
Start: 2022-02-09 | End: 2022-02-21 | Stop reason: SDUPTHER

## 2022-02-09 NOTE — TELEPHONE ENCOUNTER
Laura Morrison states he called [id number] [facility] and told them trazodone is not working  He states [facility] told him they would call to get Dr Cook's approval for zolpidem  (As of now, they have yet to call)  I told Laura Morrison I would put in a refill request while we wait to see if that speeds the process along      [place]

## 2022-02-21 DIAGNOSIS — G47.9 DISTURBANCE OF SLEEP: ICD-10-CM

## 2022-02-21 RX ORDER — ZOLPIDEM TARTRATE 12.5 MG/1
12.5 TABLET, FILM COATED, EXTENDED RELEASE ORAL
Qty: 30 TABLET | Refills: 0 | Status: SHIPPED | OUTPATIENT
Start: 2022-02-21 | End: 2022-03-14

## 2022-03-01 ENCOUNTER — OFFICE VISIT (OUTPATIENT)
Dept: FAMILY MEDICINE CLINIC | Facility: CLINIC | Age: 77
End: 2022-03-01
Payer: COMMERCIAL

## 2022-03-01 VITALS
HEIGHT: 72 IN | TEMPERATURE: 96.7 F | HEART RATE: 67 BPM | DIASTOLIC BLOOD PRESSURE: 74 MMHG | WEIGHT: 199 LBS | SYSTOLIC BLOOD PRESSURE: 118 MMHG | OXYGEN SATURATION: 97 % | RESPIRATION RATE: 16 BRPM | BODY MASS INDEX: 26.95 KG/M2

## 2022-03-01 DIAGNOSIS — I10 PRIMARY HYPERTENSION: Primary | ICD-10-CM

## 2022-03-01 DIAGNOSIS — M15.9 GENERALIZED OSTEOARTHRITIS OF MULTIPLE SITES: ICD-10-CM

## 2022-03-01 DIAGNOSIS — J45.20 MILD INTERMITTENT ASTHMA WITHOUT COMPLICATION: ICD-10-CM

## 2022-03-01 DIAGNOSIS — M10.9 GOUTY ARTHROPATHY: ICD-10-CM

## 2022-03-01 DIAGNOSIS — E78.2 MIXED HYPERLIPIDEMIA: ICD-10-CM

## 2022-03-01 DIAGNOSIS — N40.0 HYPERPLASIA OF PROSTATE: ICD-10-CM

## 2022-03-01 PROBLEM — R21 RASH: Status: RESOLVED | Noted: 2022-01-05 | Resolved: 2022-03-01

## 2022-03-01 LAB
CHOLEST SERPL-MCNC: 217 MG/DL
HDLC SERPL-MCNC: 54 MG/DL
LDLC SERPL CALC-MCNC: 139 MG/DL (ref 0–100)
NONHDLC SERPL-MCNC: 163 MG/DL
TRIGL SERPL-MCNC: 118 MG/DL
URATE SERPL-MCNC: 6.7 MG/DL (ref 4.2–8)

## 2022-03-01 PROCEDURE — 80053 COMPREHEN METABOLIC PANEL: CPT | Performed by: FAMILY MEDICINE

## 2022-03-01 PROCEDURE — 3725F SCREEN DEPRESSION PERFORMED: CPT | Performed by: FAMILY MEDICINE

## 2022-03-01 PROCEDURE — 99214 OFFICE O/P EST MOD 30 MIN: CPT | Performed by: FAMILY MEDICINE

## 2022-03-01 PROCEDURE — 84550 ASSAY OF BLOOD/URIC ACID: CPT | Performed by: FAMILY MEDICINE

## 2022-03-01 PROCEDURE — 36415 COLL VENOUS BLD VENIPUNCTURE: CPT | Performed by: FAMILY MEDICINE

## 2022-03-01 PROCEDURE — 80061 LIPID PANEL: CPT | Performed by: FAMILY MEDICINE

## 2022-03-01 RX ORDER — NITROFURANTOIN 25; 75 MG/1; MG/1
100 CAPSULE ORAL EVERY 12 HOURS
COMMUNITY
Start: 2022-02-24 | End: 2022-03-16

## 2022-03-01 NOTE — PROGRESS NOTES
Depression Screening and Follow-up Plan: Patient was screened for depression during today's encounter  They screened negative with a PHQ-2 score of 0  Assessment/Plan:    Hypertension  STABLE  DENIES ANY CP, SOB, PALPITATIONS, OR HEADACHE  NOTES NO WATER RETENTION  COMPLIANT WITH MEDICATION  NO CONCERNS    - CONTINUE CURRENT TREATMENT PLAN  - MONITOR DIETARY SODIUM INTAKE  - ENCOURAGE PHYSICAL ACTIVITY  - RV 6 MONTHS      Mild intermittent asthma without complication  STABLE      Generalized osteoarthritis of multiple sites  STABLE  DENIES ANY JOINT SWELLING OR REDNESS  JOINT STIFFNESS PRESENT  PAIN MANAGEMENT ADEQUATE    - CONTINUE CURRENT MANAGEMENT  - MEDICATION AS DIRECTED  - CALL / RETURN IF SYMPTOMS WORSEN      Hyperlipidemia  WATCHING CHOLESTEROL INTAKE  COMPLIANT WITH MEDICATION  NO CONCERNS    - CONTINUE TO MONITOR DIETARY CHOL INTAKE  - CONTINUE CURRENT MEDICATION  - ENCOURAGED PHYSICAL ACTIVITY           Diagnoses and all orders for this visit:    Primary hypertension  -     Comprehensive metabolic panel  -     Uric acid    Mild intermittent asthma without complication    Generalized osteoarthritis of multiple sites    Mixed hyperlipidemia  -     Lipid panel  -     Comprehensive metabolic panel    Gouty arthropathy  -     Uric acid    Hyperplasia of prostate    Other orders  -     triamcinolone (KENALOG) 0 1 % ointment; APPLY TOPICALLY TO RASH TWICE DAILY FOR 2 WEEKS  -     nitrofurantoin (MACROBID) 100 mg capsule; Take 100 mg by mouth every 12 (twelve) hours  -     Mirabegron (MYRBETRIQ PO); Take 50 mg by mouth in the morning  -     Omega-3 Fatty Acids (FISH OIL PO); Take by mouth in the morning          Patient Instructions   CONTINUE CURRENT TREATMENT PLAN  MONITOR DIETARY SODIUM, CHOL, AND CARBOHYDRATE INTAKE  ENCOURAGE PHYSICAL ACTIVITY      RV 6 M, SOONER PRN        No follow-ups on file  Subjective:      Patient ID: Barney Echevarria is a 77 y o  male      Chief Complaint   Patient presents with    Follow-up     SIX MONTHS        PATIENT RETURNS FOR ROUTINE EVALUATION OF PATIENT'S MEDICAL ISSUES    INDIVIDUAL MEDICAL ISSUES WITH THEIR CURRENT STATUS, ASSESSMENT AND PLANS ARE LISTED ABOVE          The following portions of the patient's history were reviewed and updated as appropriate: allergies, current medications, past family history, past medical history, past social history, past surgical history and problem list     Review of Systems   Constitutional: Negative for chills, fatigue and fever  HENT: Negative for congestion, ear discharge, ear pain, mouth sores, postnasal drip, sore throat and trouble swallowing  Eyes: Negative for pain, discharge and visual disturbance  Respiratory: Negative for cough, shortness of breath and wheezing  Cardiovascular: Negative for chest pain, palpitations and leg swelling  Gastrointestinal: Negative for abdominal distention, abdominal pain, blood in stool, diarrhea and nausea  Endocrine: Negative for polydipsia, polyphagia and polyuria  Genitourinary: Negative for dysuria, frequency, hematuria and urgency  Musculoskeletal: Negative for arthralgias, gait problem and joint swelling  Skin: Negative for pallor and rash  Neurological: Negative for dizziness, syncope, speech difficulty, weakness, light-headedness, numbness and headaches  Hematological: Negative for adenopathy  Psychiatric/Behavioral: Negative for behavioral problems, confusion and sleep disturbance  The patient is not nervous/anxious  Current Outpatient Medications   Medication Sig Dispense Refill    finasteride (PROSCAR) 5 mg tablet Take 5 mg by mouth daily      Mirabegron (MYRBETRIQ PO) Take 50 mg by mouth in the morning      mometasone (Asmanex Twisthaler) 110 MCG/INH AEPB inhaler Inhale 1 puff 2 (two) times a day Rinse mouth after use   3 each 3    multivitamin (THERAGRAN) TABS Take 1 tablet by mouth daily      nabumetone (RELAFEN) 750 mg tablet TAKE 1 TABLET(750 MG) BY MOUTH TWICE DAILY 180 tablet 1    nitrofurantoin (MACROBID) 100 mg capsule Take 100 mg by mouth every 12 (twelve) hours      Omega-3 Fatty Acids (FISH OIL PO) Take by mouth in the morning      triamcinolone (KENALOG) 0 1 % ointment APPLY TOPICALLY TO RASH TWICE DAILY FOR 2 WEEKS      zolpidem (AMBIEN CR) 12 5 MG CR tablet Take 1 tablet (12 5 mg total) by mouth daily at bedtime as needed for sleep 30 tablet 0     No current facility-administered medications for this visit  Objective:    /74   Pulse 67   Temp (!) 96 7 °F (35 9 °C) (Temporal)   Resp 16   Ht 6' (1 829 m)   Wt 90 3 kg (199 lb)   SpO2 97%   BMI 26 99 kg/m²        Physical Exam  Constitutional:       Appearance: He is well-developed  HENT:      Head: Normocephalic and atraumatic  Eyes:      General:         Right eye: No discharge  Left eye: No discharge  Conjunctiva/sclera: Conjunctivae normal       Pupils: Pupils are equal, round, and reactive to light  Neck:      Thyroid: No thyromegaly  Vascular: No JVD  Cardiovascular:      Rate and Rhythm: Normal rate and regular rhythm  Heart sounds: Normal heart sounds  No murmur heard  Pulmonary:      Effort: Pulmonary effort is normal       Breath sounds: Normal breath sounds  No wheezing or rales  Abdominal:      General: Bowel sounds are normal       Palpations: Abdomen is soft  There is no mass  Tenderness: There is no abdominal tenderness  There is no guarding or rebound  Musculoskeletal:         General: No tenderness or deformity  Normal range of motion  Cervical back: Neck supple  Lymphadenopathy:      Cervical: No cervical adenopathy  Skin:     General: Skin is warm and dry  Findings: No erythema or rash  Neurological:      Mental Status: He is alert and oriented to person, place, and time  Psychiatric:         Behavior: Behavior normal          Thought Content:  Thought content normal          Judgment: Judgment miracle Knowles MD

## 2022-03-02 LAB
ALBUMIN SERPL BCP-MCNC: 4.1 G/DL (ref 3.5–5)
ALP SERPL-CCNC: 89 U/L (ref 46–116)
ALT SERPL W P-5'-P-CCNC: 24 U/L (ref 12–78)
ANION GAP SERPL CALCULATED.3IONS-SCNC: 4 MMOL/L (ref 4–13)
AST SERPL W P-5'-P-CCNC: 22 U/L (ref 5–45)
BILIRUB SERPL-MCNC: 0.55 MG/DL (ref 0.2–1)
BUN SERPL-MCNC: 10 MG/DL (ref 5–25)
CALCIUM SERPL-MCNC: 9.9 MG/DL (ref 8.3–10.1)
CHLORIDE SERPL-SCNC: 102 MMOL/L (ref 100–108)
CO2 SERPL-SCNC: 33 MMOL/L (ref 21–32)
CREAT SERPL-MCNC: 0.94 MG/DL (ref 0.6–1.3)
GFR SERPL CREATININE-BSD FRML MDRD: 78 ML/MIN/1.73SQ M
GLUCOSE SERPL-MCNC: 91 MG/DL (ref 65–140)
POTASSIUM SERPL-SCNC: 4.8 MMOL/L (ref 3.5–5.3)
PROT SERPL-MCNC: 7.2 G/DL (ref 6.4–8.2)
SODIUM SERPL-SCNC: 139 MMOL/L (ref 136–145)

## 2022-03-14 ENCOUNTER — TELEPHONE (OUTPATIENT)
Dept: FAMILY MEDICINE CLINIC | Facility: CLINIC | Age: 77
End: 2022-03-14

## 2022-03-14 NOTE — TELEPHONE ENCOUNTER
We did a 2nd level of appeal for [facility] Zolpidem  They denied it for the following reason    Spoke to [place] and he said that he will try another one      Send to [employer] [facility] in [facility]

## 2022-03-16 ENCOUNTER — OFFICE VISIT (OUTPATIENT)
Dept: FAMILY MEDICINE CLINIC | Facility: CLINIC | Age: 77
End: 2022-03-16
Payer: COMMERCIAL

## 2022-03-16 VITALS
HEIGHT: 72 IN | OXYGEN SATURATION: 99 % | TEMPERATURE: 97.8 F | SYSTOLIC BLOOD PRESSURE: 120 MMHG | HEART RATE: 64 BPM | BODY MASS INDEX: 26.82 KG/M2 | DIASTOLIC BLOOD PRESSURE: 72 MMHG | WEIGHT: 198 LBS | RESPIRATION RATE: 16 BRPM

## 2022-03-16 DIAGNOSIS — G47.9 DISTURBANCE OF SLEEP: ICD-10-CM

## 2022-03-16 DIAGNOSIS — E78.2 MIXED HYPERLIPIDEMIA: Primary | ICD-10-CM

## 2022-03-16 DIAGNOSIS — I10 PRIMARY HYPERTENSION: ICD-10-CM

## 2022-03-16 DIAGNOSIS — M15.9 GENERALIZED OSTEOARTHRITIS OF MULTIPLE SITES: ICD-10-CM

## 2022-03-16 PROCEDURE — 1036F TOBACCO NON-USER: CPT | Performed by: FAMILY MEDICINE

## 2022-03-16 PROCEDURE — 3078F DIAST BP <80 MM HG: CPT | Performed by: FAMILY MEDICINE

## 2022-03-16 PROCEDURE — 1160F RVW MEDS BY RX/DR IN RCRD: CPT | Performed by: FAMILY MEDICINE

## 2022-03-16 PROCEDURE — 3074F SYST BP LT 130 MM HG: CPT | Performed by: FAMILY MEDICINE

## 2022-03-16 PROCEDURE — 99213 OFFICE O/P EST LOW 20 MIN: CPT | Performed by: FAMILY MEDICINE

## 2022-03-16 RX ORDER — ZOLPIDEM TARTRATE 10 MG/1
10 TABLET ORAL
Qty: 30 TABLET | Refills: 3 | Status: SHIPPED | OUTPATIENT
Start: 2022-03-16 | End: 2022-07-05 | Stop reason: SDUPTHER

## 2022-03-16 NOTE — PATIENT INSTRUCTIONS
CONTINUE CURRENT TREATMENT PLAN  MONITOR DIETARY SODIUM, CHOL, AND CARBOHYDRATE INTAKE  ENCOURAGE PHYSICAL ACTIVITY    CONSIDER CALCIUM SCORING AND STRESS TEST IF NEEDED    RV 3 M, SOONER PRN

## 2022-03-16 NOTE — PROGRESS NOTES
Assessment/Plan:    Hyperlipidemia  WATCHING CHOLESTEROL INTAKE  COMPLIANT WITH MEDICATION  NO CONCERNS    - CONTINUE TO MONITOR DIETARY CHOL INTAKE  - CONTINUE CURRENT MEDICATION  - ENCOURAGED PHYSICAL ACTIVITY        Hypertension  STABLE  DENIES ANY CP, SOB, PALPITATIONS, OR HEADACHE  NOTES NO WATER RETENTION  COMPLIANT WITH MEDICATION  NO CONCERNS    - CONTINUE CURRENT TREATMENT PLAN  - MONITOR DIETARY SODIUM INTAKE  - ENCOURAGE PHYSICAL ACTIVITY  - RV 3 MONTHS      Generalized osteoarthritis of multiple sites  STABLE  DENIES ANY JOINT SWELLING OR REDNESS  JOINT STIFFNESS PRESENT  PAIN MANAGEMENT ADEQUATE    - CONTINUE CURRENT MANAGEMENT  - MEDICATION AS DIRECTED  - CALL / RETURN IF SYMPTOMS WORSEN         Diagnoses and all orders for this visit:    Mixed hyperlipidemia  -     CT coronary calcium score; Future    Primary hypertension  -     CT coronary calcium score; Future    Generalized osteoarthritis of multiple sites    Disturbance of sleep  -     zolpidem (AMBIEN) 10 mg tablet; Take 1 tablet (10 mg total) by mouth daily at bedtime as needed for sleep          Patient Instructions   CONTINUE CURRENT TREATMENT PLAN  MONITOR DIETARY SODIUM, CHOL, AND CARBOHYDRATE INTAKE  ENCOURAGE PHYSICAL ACTIVITY    CONSIDER CALCIUM SCORING AND STRESS TEST IF NEEDED    RV 3 M, SOONER PRN        Return in about 3 months (around 6/16/2022) for Recheck  Subjective:      Patient ID: Greg Thompson is a 77 y o  male      Chief Complaint   Patient presents with    DISCUSS CARDIAC ISSUES       Follow up    PATIENT RETURNS FOR ROUTINE EVALUATION OF PATIENT'S MEDICAL ISSUES    INDIVIDUAL MEDICAL ISSUES WITH THEIR CURRENT STATUS, ASSESSMENT AND PLANS ARE LISTED ABOVE          The following portions of the patient's history were reviewed and updated as appropriate: allergies, current medications, past family history, past medical history, past social history, past surgical history and problem list     Review of Systems Constitutional: Negative for chills, fatigue and fever  HENT: Negative for congestion, ear discharge, ear pain, mouth sores, postnasal drip, sore throat and trouble swallowing  Eyes: Negative for pain, discharge and visual disturbance  Respiratory: Negative for cough, shortness of breath and wheezing  Cardiovascular: Negative for chest pain, palpitations and leg swelling  Gastrointestinal: Negative for abdominal distention, abdominal pain, blood in stool, diarrhea and nausea  Endocrine: Negative for polydipsia, polyphagia and polyuria  Genitourinary: Negative for dysuria, frequency, hematuria and urgency  Musculoskeletal: Negative for arthralgias, gait problem and joint swelling  Skin: Negative for pallor and rash  Neurological: Negative for dizziness, syncope, speech difficulty, weakness, light-headedness, numbness and headaches  Hematological: Negative for adenopathy  Psychiatric/Behavioral: Negative for behavioral problems, confusion and sleep disturbance  The patient is not nervous/anxious  Current Outpatient Medications   Medication Sig Dispense Refill    finasteride (PROSCAR) 5 mg tablet Take 5 mg by mouth daily      Mirabegron (MYRBETRIQ PO) Take 50 mg by mouth in the morning      mometasone (Asmanex Twisthaler) 110 MCG/INH AEPB inhaler Inhale 1 puff 2 (two) times a day Rinse mouth after use  3 each 3    multivitamin (THERAGRAN) TABS Take 1 tablet by mouth daily      nabumetone (RELAFEN) 750 mg tablet TAKE 1 TABLET(750 MG) BY MOUTH TWICE DAILY 180 tablet 1    Omega-3 Fatty Acids (FISH OIL PO) Take by mouth in the morning      zolpidem (AMBIEN) 10 mg tablet Take 1 tablet (10 mg total) by mouth daily at bedtime as needed for sleep 30 tablet 3     No current facility-administered medications for this visit         Objective:    /72   Pulse 64   Temp 97 8 °F (36 6 °C) (Temporal)   Resp 16   Ht 6' (1 829 m)   Wt 89 8 kg (198 lb)   SpO2 99%   BMI 26 85 kg/m² Physical Exam       no pe was performed    Length of visit 20 min  Length of  20 min      Altagracia Puente MD

## 2022-03-28 ENCOUNTER — HOSPITAL ENCOUNTER (OUTPATIENT)
Dept: CT IMAGING | Facility: HOSPITAL | Age: 77
Discharge: HOME | End: 2022-03-28
Payer: COMMERCIAL

## 2022-03-28 DIAGNOSIS — I10 PRIMARY HYPERTENSION: ICD-10-CM

## 2022-03-28 DIAGNOSIS — E78.2 MIXED HYPERLIPIDEMIA: ICD-10-CM

## 2022-03-28 PROCEDURE — G1004 CDSM NDSC: HCPCS

## 2022-03-28 PROCEDURE — 75571 CT HRT W/O DYE W/CA TEST: CPT

## 2022-04-08 ENCOUNTER — OFFICE VISIT (OUTPATIENT)
Dept: FAMILY MEDICINE CLINIC | Facility: CLINIC | Age: 77
End: 2022-04-08
Payer: COMMERCIAL

## 2022-04-08 VITALS
HEIGHT: 72 IN | HEART RATE: 80 BPM | RESPIRATION RATE: 12 BRPM | DIASTOLIC BLOOD PRESSURE: 68 MMHG | SYSTOLIC BLOOD PRESSURE: 114 MMHG | BODY MASS INDEX: 26.55 KG/M2 | WEIGHT: 196 LBS | OXYGEN SATURATION: 99 % | TEMPERATURE: 97 F

## 2022-04-08 DIAGNOSIS — I25.10 ATHEROSCLEROSIS OF CORONARY ARTERY OF NATIVE HEART WITHOUT ANGINA PECTORIS, UNSPECIFIED VESSEL OR LESION TYPE: ICD-10-CM

## 2022-04-08 DIAGNOSIS — R93.1 ELEVATED CORONARY ARTERY CALCIUM SCORE: Primary | ICD-10-CM

## 2022-04-08 DIAGNOSIS — I10 PRIMARY HYPERTENSION: ICD-10-CM

## 2022-04-08 DIAGNOSIS — E78.2 MIXED HYPERLIPIDEMIA: ICD-10-CM

## 2022-04-08 PROCEDURE — 1036F TOBACCO NON-USER: CPT | Performed by: FAMILY MEDICINE

## 2022-04-08 PROCEDURE — 3074F SYST BP LT 130 MM HG: CPT | Performed by: FAMILY MEDICINE

## 2022-04-08 PROCEDURE — 99214 OFFICE O/P EST MOD 30 MIN: CPT | Performed by: FAMILY MEDICINE

## 2022-04-08 PROCEDURE — 1160F RVW MEDS BY RX/DR IN RCRD: CPT | Performed by: FAMILY MEDICINE

## 2022-04-08 PROCEDURE — 3078F DIAST BP <80 MM HG: CPT | Performed by: FAMILY MEDICINE

## 2022-04-08 RX ORDER — LORATADINE 10 MG/1
10 TABLET ORAL DAILY
COMMUNITY

## 2022-04-08 RX ORDER — ROSUVASTATIN CALCIUM 20 MG/1
20 TABLET, COATED ORAL DAILY
Qty: 90 TABLET | Refills: 3 | Status: SHIPPED | OUTPATIENT
Start: 2022-04-08

## 2022-04-08 NOTE — PROGRESS NOTES
Assessment/Plan:    No problem-specific Assessment & Plan notes found for this encounter  Diagnoses and all orders for this visit:    Elevated coronary artery calcium score  -     rosuvastatin (CRESTOR) 20 MG tablet; Take 1 tablet (20 mg total) by mouth daily  -     Ambulatory Referral to Cardiology; Future    Atherosclerosis of coronary artery of native heart without angina pectoris, unspecified vessel or lesion type  -     rosuvastatin (CRESTOR) 20 MG tablet; Take 1 tablet (20 mg total) by mouth daily  -     Ambulatory Referral to Cardiology; Future    Primary hypertension    Mixed hyperlipidemia  -     rosuvastatin (CRESTOR) 20 MG tablet; Take 1 tablet (20 mg total) by mouth daily  -     Ambulatory Referral to Cardiology; Future    Other orders  -     loratadine (CLARITIN) 10 mg tablet;  Take 10 mg by mouth daily          Patient Instructions     CONTINUE CURRENT TREATMENT PLAN  MONITOR DIETARY SODIUM, CHOL, AND CARBOHYDRATE INTAKE  ENCOURAGE PHYSICAL ACTIVITY    QUIANA ODOM    CARDIOLOGY CONSULT  ECHO  EST    RV 6 WEEKS FOR FOLLOW UP BW      Recent Results (from the past 2688 hour(s))   Lipid panel    Collection Time: 03/01/22 10:56 AM   Result Value Ref Range    Cholesterol 217 (H) See Comment mg/dL    Triglycerides 118 See Comment mg/dL    HDL, Direct 54 >=40 mg/dL    LDL Calculated 139 (H) 0 - 100 mg/dL    Non-HDL-Chol (CHOL-HDL) 163 mg/dl   Comprehensive metabolic panel    Collection Time: 03/01/22 10:56 AM   Result Value Ref Range    Sodium 139 136 - 145 mmol/L    Potassium 4 8 3 5 - 5 3 mmol/L    Chloride 102 100 - 108 mmol/L    CO2 33 (H) 21 - 32 mmol/L    ANION GAP 4 4 - 13 mmol/L    BUN 10 5 - 25 mg/dL    Creatinine 0 94 0 60 - 1 30 mg/dL    Glucose 91 65 - 140 mg/dL    Calcium 9 9 8 3 - 10 1 mg/dL    AST 22 5 - 45 U/L    ALT 24 12 - 78 U/L    Alkaline Phosphatase 89 46 - 116 U/L    Total Protein 7 2 6 4 - 8 2 g/dL    Albumin 4 1 3 5 - 5 0 g/dL    Total Bilirubin 0 55 0 20 - 1 00 mg/dL    eGFR 78 ml/min/1 73sq m   Uric acid    Collection Time: 03/01/22 10:56 AM   Result Value Ref Range    Uric Acid 6 7 4 2 - 8 0 mg/dL             Return in about 6 weeks (around 5/20/2022) for Recheck  Subjective:      Patient ID: Norma Varma is a 77 y o  male  Chief Complaint   Patient presents with    Follow-up     Pt here to discuss his calcium score  DISCUSSION    REVIEWED RECENT CALCIUM SCORING  = 3000  DISCUSSED DIETARY MANAGEMENT OF CHOL    WILL START CRESTOR, ASA    CARDIOLOGY CONSULT, EST, ECHO WILL BE OBTAINED      The following portions of the patient's history were reviewed and updated as appropriate: allergies, current medications, past family history, past medical history, past social history, past surgical history and problem list     Review of Systems   Constitutional: Negative for chills, fatigue and fever  HENT: Negative for congestion, ear discharge, ear pain, mouth sores, postnasal drip, sore throat and trouble swallowing  Eyes: Negative for pain, discharge and visual disturbance  Respiratory: Negative for cough, shortness of breath and wheezing  Cardiovascular: Negative for chest pain, palpitations and leg swelling  Gastrointestinal: Negative for abdominal distention, abdominal pain, blood in stool, diarrhea and nausea  Endocrine: Negative for polydipsia, polyphagia and polyuria  Genitourinary: Negative for dysuria, frequency, hematuria and urgency  Musculoskeletal: Negative for arthralgias, gait problem and joint swelling  Skin: Negative for pallor and rash  Neurological: Negative for dizziness, syncope, speech difficulty, weakness, light-headedness, numbness and headaches  Hematological: Negative for adenopathy  Psychiatric/Behavioral: Negative for behavioral problems, confusion and sleep disturbance  The patient is not nervous/anxious            Current Outpatient Medications   Medication Sig Dispense Refill    finasteride (PROSCAR) 5 mg tablet Take 5 mg by mouth daily      loratadine (CLARITIN) 10 mg tablet Take 10 mg by mouth daily      Mirabegron (MYRBETRIQ PO) Take 50 mg by mouth in the morning      mometasone (Asmanex Twisthaler) 110 MCG/INH AEPB inhaler Inhale 1 puff 2 (two) times a day Rinse mouth after use  3 each 3    multivitamin (THERAGRAN) TABS Take 1 tablet by mouth daily      nabumetone (RELAFEN) 750 mg tablet TAKE 1 TABLET(750 MG) BY MOUTH TWICE DAILY 180 tablet 1    Omega-3 Fatty Acids (FISH OIL PO) Take by mouth in the morning      zolpidem (AMBIEN) 10 mg tablet Take 1 tablet (10 mg total) by mouth daily at bedtime as needed for sleep 30 tablet 3    rosuvastatin (CRESTOR) 20 MG tablet Take 1 tablet (20 mg total) by mouth daily 90 tablet 3     No current facility-administered medications for this visit         Objective:    /68 (BP Location: Left arm, Patient Position: Sitting, Cuff Size: Adult)   Pulse 80   Temp (!) 97 °F (36 1 °C) (Temporal)   Resp 12   Ht 6' (1 829 m)   Wt 88 9 kg (196 lb)   SpO2 99%   BMI 26 58 kg/m²        Physical Exam       NO PE WAS PERFORMED    LENGTH OF VISIT 25 MIN  LENGTH OF  25 MIN        Tiffany Guadalupe MD

## 2022-04-08 NOTE — PATIENT INSTRUCTIONS
CONTINUE CURRENT TREATMENT PLAN  MONITOR DIETARY SODIUM, CHOL, AND CARBOHYDRATE INTAKE  ENCOURAGE PHYSICAL ACTIVITY    CRESTOR  ASA    CARDIOLOGY CONSULT  ECHO  EST    RV 6 WEEKS FOR FOLLOW UP BW      Recent Results (from the past 2688 hour(s))   Lipid panel    Collection Time: 03/01/22 10:56 AM   Result Value Ref Range    Cholesterol 217 (H) See Comment mg/dL    Triglycerides 118 See Comment mg/dL    HDL, Direct 54 >=40 mg/dL    LDL Calculated 139 (H) 0 - 100 mg/dL    Non-HDL-Chol (CHOL-HDL) 163 mg/dl   Comprehensive metabolic panel    Collection Time: 03/01/22 10:56 AM   Result Value Ref Range    Sodium 139 136 - 145 mmol/L    Potassium 4 8 3 5 - 5 3 mmol/L    Chloride 102 100 - 108 mmol/L    CO2 33 (H) 21 - 32 mmol/L    ANION GAP 4 4 - 13 mmol/L    BUN 10 5 - 25 mg/dL    Creatinine 0 94 0 60 - 1 30 mg/dL    Glucose 91 65 - 140 mg/dL    Calcium 9 9 8 3 - 10 1 mg/dL    AST 22 5 - 45 U/L    ALT 24 12 - 78 U/L    Alkaline Phosphatase 89 46 - 116 U/L    Total Protein 7 2 6 4 - 8 2 g/dL    Albumin 4 1 3 5 - 5 0 g/dL    Total Bilirubin 0 55 0 20 - 1 00 mg/dL    eGFR 78 ml/min/1 73sq m   Uric acid    Collection Time: 03/01/22 10:56 AM   Result Value Ref Range    Uric Acid 6 7 4 2 - 8 0 mg/dL

## 2022-04-08 NOTE — LETTER
JOHANNE HAMLIN      Current Outpatient Medications:     finasteride (PROSCAR) 5 mg tablet, Take 5 mg by mouth daily, Disp: , Rfl:     loratadine (CLARITIN) 10 mg tablet, Take 10 mg by mouth daily, Disp: , Rfl:     Mirabegron (MYRBETRIQ PO), Take 50 mg by mouth in the morning, Disp: , Rfl:     mometasone (Asmanex Twisthaler) 110 MCG/INH AEPB inhaler, Inhale 1 puff 2 (two) times a day Rinse mouth after use , Disp: 3 each, Rfl: 3    multivitamin (THERAGRAN) TABS, Take 1 tablet by mouth daily, Disp: , Rfl:     nabumetone (RELAFEN) 750 mg tablet, TAKE 1 TABLET(750 MG) BY MOUTH TWICE DAILY, Disp: 180 tablet, Rfl: 1    Omega-3 Fatty Acids (FISH OIL PO), Take by mouth in the morning, Disp: , Rfl:     zolpidem (AMBIEN) 10 mg tablet, Take 1 tablet (10 mg total) by mouth daily at bedtime as needed for sleep, Disp: 30 tablet, Rfl: 3    rosuvastatin (CRESTOR) 20 MG tablet, Take 1 tablet (20 mg total) by mouth daily, Disp: 90 tablet, Rfl: 3      Recent Results (from the past 2688 hour(s))   Lipid panel    Collection Time: 03/01/22 10:56 AM   Result Value Ref Range    Cholesterol 217 (H) See Comment mg/dL    Triglycerides 118 See Comment mg/dL    HDL, Direct 54 >=40 mg/dL    LDL Calculated 139 (H) 0 - 100 mg/dL    Non-HDL-Chol (CHOL-HDL) 163 mg/dl   Comprehensive metabolic panel    Collection Time: 03/01/22 10:56 AM   Result Value Ref Range    Sodium 139 136 - 145 mmol/L    Potassium 4 8 3 5 - 5 3 mmol/L    Chloride 102 100 - 108 mmol/L    CO2 33 (H) 21 - 32 mmol/L    ANION GAP 4 4 - 13 mmol/L    BUN 10 5 - 25 mg/dL    Creatinine 0 94 0 60 - 1 30 mg/dL    Glucose 91 65 - 140 mg/dL    Calcium 9 9 8 3 - 10 1 mg/dL    AST 22 5 - 45 U/L    ALT 24 12 - 78 U/L    Alkaline Phosphatase 89 46 - 116 U/L    Total Protein 7 2 6 4 - 8 2 g/dL    Albumin 4 1 3 5 - 5 0 g/dL    Total Bilirubin 0 55 0 20 - 1 00 mg/dL    eGFR 78 ml/min/1 73sq m   Uric acid    Collection Time: 03/01/22 10:56 AM   Result Value Ref Range    Uric Acid 6 7 4 2 - 8 0 mg/dL

## 2022-04-25 DIAGNOSIS — R35.0 URINARY FREQUENCY: Primary | ICD-10-CM

## 2022-04-25 NOTE — TELEPHONE ENCOUNTER
Larnell Merlin states Dr Kathryn Grimm gave him samples and asked for Dr Oumou Machado to continue to refll

## 2022-05-18 ENCOUNTER — OFFICE VISIT (OUTPATIENT)
Dept: PHYSICAL THERAPY | Facility: CLINIC | Age: 77
End: 2022-05-18
Payer: COMMERCIAL

## 2022-05-18 DIAGNOSIS — G89.29 CHRONIC MIDLINE LOW BACK PAIN WITH BILATERAL SCIATICA: Primary | ICD-10-CM

## 2022-05-18 DIAGNOSIS — M54.41 CHRONIC MIDLINE LOW BACK PAIN WITH BILATERAL SCIATICA: Primary | ICD-10-CM

## 2022-05-18 DIAGNOSIS — M54.42 CHRONIC MIDLINE LOW BACK PAIN WITH BILATERAL SCIATICA: Primary | ICD-10-CM

## 2022-05-18 DIAGNOSIS — R29.898 WEAKNESS OF BOTH HIPS: ICD-10-CM

## 2022-05-18 PROCEDURE — 97161 PT EVAL LOW COMPLEX 20 MIN: CPT

## 2022-05-18 NOTE — PROGRESS NOTES
PT Evaluation   Today's date: 2022  Patient name: Vernell Burks  : 1945  MRN: [id number]  Referring provider: No ref  provider found  Dx: No diagnosis found  Assessment  Assessment details: Patient is a 77 y o  Male who presents with referring diagnosis of chronic low back pain  He presents with limitations in lumbar and thoracic mobility, core and hip strength as well as poor posture which contribute to his functional deficits in ***  Impairments: Abnormal coordination, Abnormal or restricted ROM, Activity intolerance, Impaired physical strength, Lacks appropriate HEP, Poor posture, Poor body mechanics, Pain with function and Abnormal movement  Understanding of Dx/Px/POC: Good  Prognosis: Good   Positive prognostic factors:    Negative prognostic factors: age, time since onset     Patient verbalized understanding of POC  Please contact me if you have any questions or recommendations  Thank you for the referral and the opportunity to share in [address] [place] [address] care      Plan  Plan details:     Patient would benefit from: PT Eval and Skilled PT  Planned modality interventions: Cryotherapy, TENS and Thermotherapy: Hydrocollator Packs  Planned therapy interventions: Abdominal trunk stabilization, Balance/WB training, Body mechanics training, Functional ROM exercises, Gait training, HEP, Joint mobilization, Manual therapy, Neuromuscular re-education, Patient education, Strengthening, Stretching, Therapeutic activities, Therapeutic exercises, Transfer training and Activity modification  Frequency: 2x/wk  Duration in weeks: 12  Plan of Care beginning date: 2022  Plan of Care expiration date: 3 months - 2022  Treatment plan discussed with: Patient       Goals  Short Term Goals (6 weeks):    - Patient will be independent in basic HEP 2-3 weeks  - Patient will report >50% reduction in pain  - Patient will demonstrate >1/3 improvement in MMT grade as applicable  - ***  - ***    Long Term Goals (12 weeks):  - Patient will be independent in a comprehensive home exercise program  - Patient FOTO score will improve to ***/100  - Patient will self-report >***% improvement in function  - ***  - ***      Subjective  ***      History of Present Illness  - Mechanism of injury: ***  - Primary AD: ***  - Assist level at home: ***  - Prior level of function: ***      Pain  - Current pain rating: ***/10  - At best pain rating: ***/10  - At worst pain rating: ***/10  - Location: ***  - Aggravating factors: ***    Social Support  - Steps to enter house: ***  - Stairs in house: ***   - Bedroom/bathroom set up: ***  - DME available: ***  - Lives in: ***  - Lives with: ***      Objective     Red Flag Screening  - Positive for: {Red Flag Screen:57739}    - Negative for: {Red Flag Screen:97928}     Postural Assessment  - Posture in Sitting: ***  - Posture in Standing: ***  - Postural Correction: ***    Sensation  - Light touch: ***  - Reflexes:   Left: Patellar: {EXAM; REFLEXES:11446} Achilles: {EXAM; REFLEXES:26647}  Right: Patellar: {EXAM; REFLEXES:40716} Achilles: {EXAM; REFLEXES:49361}      LE MMT  LEFT   RIGHT  -Hip Flexion:   {ZNC:83120}  {DSD:27699}  -Hip Extension:  {TR}  {GEU:18751}   -Hip Abduction: {BDA:05573}  {UVA:86886}  -Hip Adduction: {BKF:03635}  {ODH:16692}  -Hip IR:  {DPN:75998}  {EFRAIN:83312}  -Hip ER:  {JOZ:83170}  {KWV:28722}    -Knee Flexion  {MMT:24403}  {JBW:16130}  -Knee Extension {MMT:52609}  {TMY:56522}    -Ankle DF  {MMT:69150}  {PBM:27586}  -Ankle PF  {MMT:33777}  {BGC:21099}        Lumbar Spine Range of Motion  {Lumbar ROM:14278}    Hip Range of Motion    LEFT  RIGHT  - Flexion ***  ***  - IR  ***  ***  - ER  ***  ***  - Extension ***  ***    Mechanical Assessment  In Standing:  -Flexion: {Mechanical Assessment:81091}  -Extension: {Mechanical Assessment:70540}  -Left Side-Glide: {Mechanical Assessment:74462}  -Right Side-Glide: {Mechanical Assessment:83380}        Joint Play  T10: {Joint Play:31973}  T11: {Joint Play:18712}  T12: {Joint Play:98925}  L1: {Joint Play:42842}  L2: {Joint Play:45890}  L3: {Joint Play:67168}  L4: {Joint Play:76241}  L5: {Joint Play:02736}    Palpation  (+) TTP of {palpation:03852}  (-) TTP of {palpation:98110}    Diagnostic Tests Performed  Positive: {Lumbar Diagnostic Tests:41223}   Negative: {Lumbar Diagnostic Tests:07919}    Functional Assessment  -Functional Squat: ***  -Forward Lunge: ***  -Stair Negotiation: ***  -Gait Assessment: ***         Precautions: ***  Past Medical History:   Diagnosis Date    Cataract     resolved: 7/20/2016    Glaucoma     Sciatica, right side     last assessed: 7/20/2016       Date ***        Visit Number IE        Manual                                                      TherEx                                                                                                                                                                  TherAct         Patient education                                             Gait Training                                    Modalities         CP                 Insurance:  A/CMS Kaleb/ Madiha Collazo #/ Referral # Visit limitation? PT only or  PT+OT?  Co-Insurance   BC 5/18 none BOMN  20%

## 2022-05-18 NOTE — PROGRESS NOTES
PT Evaluation   Today's date: 2022  Patient name: Marciano Hua  : 1945  MRN: [id number]  Referring provider: Dirk Chavez MD  Dx:   Encounter Diagnosis     ICD-10-CM    1  Chronic midline low back pain with bilateral sciatica  M54 41     M54 42     G89 29    2  Weakness of both hips  R29 898            Assessment  Assessment details: Patient is a 77 y o  Male who presents with referring diagnosis of chronic low back pain  He presents with pain that increases with sitting as well as peripheralization with lumbar sidebending and rotation bilaterally  He demonstrates low back pain with standing lumbar extensions, reduction of pain with centralization with prone laying  He presents with weakness in bilateral hips R>L which contributes to pain with ADLs such as housework and yard work  He presents with hypomobility in lower lumbar spine as well as poor sitting posture  These findings contribute to his functional deficits in turning in bed, sitting, raking and shoveling, and housework and yardwork  Impairments: Abnormal coordination, Abnormal or restricted ROM, Activity intolerance, Impaired physical strength, Lacks appropriate HEP, Poor posture, Poor body mechanics, Pain with function and Abnormal movement  Understanding of Dx/Px/POC: Good  Prognosis: Good   Positive prognostic factors:    Negative prognostic factors: age, time since onset     Patient verbalized understanding of POC  Please contact me if you have any questions or recommendations  Thank you for the referral and the opportunity to share in [address] [place] [address] care      Plan  Plan details:     Patient would benefit from: PT Eval and Skilled PT  Planned modality interventions: Cryotherapy, TENS and Thermotherapy: Hydrocollator Packs  Planned therapy interventions: Abdominal trunk stabilization, Balance/WB training, Body mechanics training, Functional ROM exercises, Gait training, HEP, Joint mobilization, Manual therapy, Neuromuscular re-education, Patient education, Strengthening, Stretching, Therapeutic activities, Therapeutic exercises, Transfer training and Activity modification  Frequency: 2x/wk  Duration in weeks: 12  Plan of Care beginning date: 5/18/2022  Plan of Care expiration date: 3 months - 8/18/2022  Treatment plan discussed with: Patient       Goals  Short Term Goals (6 weeks):    - Patient will be independent in basic HEP 2-3 weeks  - Patient will report >50% reduction in pain  - Patient will demonstrate >1/3 improvement in MMT grade as applicable  - pt will be able to tolerate sitting >15 min to improve tolerance for sitting while driving   -  Pt will be able to tolerate yard work x 20 min without increase in pain   - patient will demonstrate 25% improvement in lumbar rotation to improve his ability to roll in bed     Long Term Goals (12 weeks):  - Patient will be independent in a comprehensive home exercise program  - Patient FOTO score will improve to 70/100  - Patient will self-report >80% improvement in function  - patient will be able to perform all housework without pain   - patient will demonstrate improved hip strength to at least 5- to allow him to perform all lifting/carrying without pain   - pt will demonstrate pain-free lumbar flexion without radicular symptoms to improve QOL       Subjective  Patient is unsure what caused his back pain  He notes that two months ago "out of nowhere" his low back got very painful  He notes that he started to get back pain that is central and will go down into one hip or the other  He notes that he does a lot of yard work and house work, light weight training, treadmill and outdoor walking  He notes that intermittently he gets tingling in his toes, unsure exactly which toes and when it comes on  He notes that turning to the right seems to increase his pain in the left back and hip   He also notes that lifting his legs in bed also give him pain  States that he tried going to a chiropractor which did not help  He notes he uses a massage pad with heat at home which helps, going in the hot tub also helps  He notes that moving quickly seems to be worse  States he is unsure of any specific motions that make the pain go into the hips other than turning  He does note that sitting seems to be worse than standing  He notes that 23 years ago he fell from his attic and fractures L4/L5  He notes previous history of back pain after playing a lot of tennis, resolved with going to the chiropractor       History of Present Illness  - Mechanism of injury: insidious   - Primary AD: none   - Assist level at home: independent   - Prior level of function: independent       Pain  - Current pain ratin/10, dull ache  - At best pain ratin/10 ache   - At worst pain ratin/10 shooting   - Location: central low back   - Aggravating factors: turning     Social Support  - Steps to enter house: yes  - Stairs in house: yes   - Lives in: home   - Lives with: wife       Objective     Red Flag Screening  - Positive for: age >47 years old    - Negative for: saddle anesthesia, bladder dysfunction and LE neurological deficits     Postural Assessment  - Posture in Sitting: fair  - Posture in Standing: fair   - Postural Correction: improves    Sensation  - Light touch: intact         LE MMT  LEFT   RIGHT  -Hip Flexion:   4+/5*  4+/5 hip pain   -Hip Extension:  4/5  4/5   -Hip Abduction: 4/5  4/5  -Hip IR:  4+/5*  4/5  -Hip ER:  4/5*  4/5    -Knee Flexion  5/5  5/5  -Knee Extension 5/5  5/5        Lumbar Spine Range of Motion  Flexion: 80% ROM, pain in low back   Ext: 65% pain in low back   Right SB: lateral joint line, low back and hip pain   Left SB: mid femur, low back and hip pain   Right Rot: 50%, low back and hip pain   Left Rot: 50% low back and hip pain     Hip Range of Motion    LEFT  RIGHT  - Flexion WFL  WFL  - IR  20  15  - ER  25  25  - Extension [facility]    Mechanical Assessment  In Standing:  -Flexion: Increasing  -Extension: Increasing  Centralized with prone lay        Joint Play  T10: Normal  T11: Normal  T12: Normal  L1: Normal  L2: Normal  L3: Hypomobile  L4: Hypomobile  L5: Hypomobile      Diagnostic Tests Performed  Positive: Slump (on right and left)   Negative: Passive SLR (on right and left ), DILCIA (on right and left) and FADIR (on right and left)    Functional Assessment  -Gait Assessment: decreased rotation during arm swing          Precautions: hx lumbar fx without complications   Past Medical History:   Diagnosis Date    Cataract     resolved: 7/20/2016    Glaucoma     Sciatica, right side     last assessed: 7/20/2016       Date 5/18   Visit Number IE   Manual                        TherEx    Prone lay  4 min    Piriformis stretch  30s B    Sciatic nerve glides 10x ea   Education  Exam, HEP, sitting posture, lumbar roll                                                       TherAct    Patient education                    Gait Training                Modalities    CP            Insurance:  AMA/CMS Eval/ Lia Anna #/ Referral # Visit limitation? PT only or  PT+OT?  Co-Insurance   BC 5/18 none BOMN  20%

## 2022-05-23 ENCOUNTER — TELEPHONE (OUTPATIENT)
Dept: FAMILY MEDICINE CLINIC | Facility: CLINIC | Age: 77
End: 2022-05-23

## 2022-05-23 DIAGNOSIS — R35.0 URINARY FREQUENCY: ICD-10-CM

## 2022-05-23 NOTE — TELEPHONE ENCOUNTER
Wants to know if you can send in an rx that he usually gets from another dr Agustín Lin 50 mg Take 1 tablet daily    If you can send this in, please send to [facility]

## 2022-05-24 ENCOUNTER — APPOINTMENT (OUTPATIENT)
Dept: RADIOLOGY | Facility: CLINIC | Age: 77
End: 2022-05-24
Payer: COMMERCIAL

## 2022-05-24 ENCOUNTER — APPOINTMENT (OUTPATIENT)
Dept: PHYSICAL THERAPY | Facility: CLINIC | Age: 77
End: 2022-05-24
Payer: COMMERCIAL

## 2022-05-24 ENCOUNTER — OFFICE VISIT (OUTPATIENT)
Dept: PHYSICAL THERAPY | Facility: CLINIC | Age: 77
End: 2022-05-24
Payer: COMMERCIAL

## 2022-05-24 DIAGNOSIS — R29.898 WEAKNESS OF BOTH HIPS: ICD-10-CM

## 2022-05-24 DIAGNOSIS — G89.29 CHRONIC MIDLINE LOW BACK PAIN, UNSPECIFIED WHETHER SCIATICA PRESENT: ICD-10-CM

## 2022-05-24 DIAGNOSIS — M54.42 CHRONIC MIDLINE LOW BACK PAIN WITH BILATERAL SCIATICA: Primary | ICD-10-CM

## 2022-05-24 DIAGNOSIS — M54.50 CHRONIC MIDLINE LOW BACK PAIN, UNSPECIFIED WHETHER SCIATICA PRESENT: ICD-10-CM

## 2022-05-24 DIAGNOSIS — G89.29 CHRONIC MIDLINE LOW BACK PAIN WITH BILATERAL SCIATICA: Primary | ICD-10-CM

## 2022-05-24 DIAGNOSIS — M54.41 CHRONIC MIDLINE LOW BACK PAIN WITH BILATERAL SCIATICA: Primary | ICD-10-CM

## 2022-05-24 PROCEDURE — 72110 X-RAY EXAM L-2 SPINE 4/>VWS: CPT

## 2022-05-24 PROCEDURE — 97110 THERAPEUTIC EXERCISES: CPT

## 2022-05-24 PROCEDURE — 97112 NEUROMUSCULAR REEDUCATION: CPT

## 2022-05-25 NOTE — PROGRESS NOTES
Daily Note     Today's date: 2022  Patient name: Dylan Muñoz  : 1945  MRN: [id number]  Referring provider: Matoe Cooney MD  Dx:   Encounter Diagnosis     ICD-10-CM    1  Chronic midline low back pain with bilateral sciatica  M54 41     M54 42     G89 29    2  Weakness of both hips  R29 898                   Subjective: patient reports that the exercises seem to help reduce the pain, but when he is active during the day the pain returns  Objective: See treatment diary below      Assessment: patient with good tolerance for therex program today  session focused on hip hinging and postural correction with squatting, good performance without any pain  addition of core stengthening today with emphasis on deep stabilizers in various positioning, intermittent cues for form but overall good performance noted  educated to continue with prone laying and prone extensions for radicular symptoms, he verbalized understanding  Continue to progress lumbar mobility, core strengthening, and body mechanics  Plan: Continue per plan of care  Progress treatment as tolerated            Precautions: hx lumbar fx without complications   Past Medical History:   Diagnosis Date    Cataract     resolved: 2016    Glaucoma     Sciatica, right side     last assessed: 2016       Date    Visit Number 2 IE   Manual                              TherEx     Prone lay   4 min    Piriformis stretch  2x30s seated  30s B    Sciatic nerve glides  10x ea   Education  Body mechanics with yard work  Exam, HEP, sitting posture, lumbar roll   Hip hinge 10x seated   10x standing     Squat  2x10                                                            Neuro Re-ed      Bent knee fallout  15x ea     TA march hooklying  10x ea    TA LE ext  10x ea     Bridges  2x10     Resisted side stepping  UE extended yellow 2x10     Resisted shoulder ext  15x5s hold blue                    Modalities     CP Insurance:  AMA/CMS Kaleb/ Isidra Lacey #/ Referral # Visit limitation? PT only or  PT+OT?  Co-Insurance   BC 5/18 none BOMN  20%

## 2022-05-31 DIAGNOSIS — J45.20 MILD INTERMITTENT ASTHMA WITHOUT COMPLICATION: ICD-10-CM

## 2022-06-01 RX ORDER — MOMETASONE FUROATE 110 UG/1
INHALANT RESPIRATORY (INHALATION)
Qty: 3 EACH | Refills: 3 | Status: SHIPPED | OUTPATIENT
Start: 2022-06-01

## 2022-06-05 DIAGNOSIS — M15.9 GENERALIZED OSTEOARTHRITIS OF MULTIPLE SITES: ICD-10-CM

## 2022-06-07 ENCOUNTER — OFFICE VISIT (OUTPATIENT)
Dept: FAMILY MEDICINE CLINIC | Facility: CLINIC | Age: 77
End: 2022-06-07
Payer: COMMERCIAL

## 2022-06-07 VITALS
HEIGHT: 72 IN | RESPIRATION RATE: 16 BRPM | OXYGEN SATURATION: 98 % | BODY MASS INDEX: 25.73 KG/M2 | TEMPERATURE: 97.3 F | WEIGHT: 190 LBS | SYSTOLIC BLOOD PRESSURE: 118 MMHG | DIASTOLIC BLOOD PRESSURE: 66 MMHG | HEART RATE: 73 BPM

## 2022-06-07 DIAGNOSIS — G89.29 CHRONIC MIDLINE LOW BACK PAIN WITH LEFT-SIDED SCIATICA: Primary | ICD-10-CM

## 2022-06-07 DIAGNOSIS — Z23 ENCOUNTER FOR IMMUNIZATION: ICD-10-CM

## 2022-06-07 DIAGNOSIS — M54.42 CHRONIC MIDLINE LOW BACK PAIN WITH LEFT-SIDED SCIATICA: Primary | ICD-10-CM

## 2022-06-07 PROCEDURE — 90715 TDAP VACCINE 7 YRS/> IM: CPT | Performed by: FAMILY MEDICINE

## 2022-06-07 PROCEDURE — 3078F DIAST BP <80 MM HG: CPT | Performed by: FAMILY MEDICINE

## 2022-06-07 PROCEDURE — 3074F SYST BP LT 130 MM HG: CPT | Performed by: FAMILY MEDICINE

## 2022-06-07 PROCEDURE — 1160F RVW MEDS BY RX/DR IN RCRD: CPT | Performed by: FAMILY MEDICINE

## 2022-06-07 PROCEDURE — 1036F TOBACCO NON-USER: CPT | Performed by: FAMILY MEDICINE

## 2022-06-07 PROCEDURE — 99213 OFFICE O/P EST LOW 20 MIN: CPT | Performed by: FAMILY MEDICINE

## 2022-06-07 PROCEDURE — 90471 IMMUNIZATION ADMIN: CPT | Performed by: FAMILY MEDICINE

## 2022-06-07 RX ORDER — PREDNISONE 20 MG/1
TABLET ORAL
Qty: 14 TABLET | Refills: 0 | Status: SHIPPED | OUTPATIENT
Start: 2022-06-07 | End: 2022-07-05 | Stop reason: SDUPTHER

## 2022-06-07 RX ORDER — NABUMETONE 750 MG/1
TABLET, FILM COATED ORAL
Qty: 180 TABLET | Refills: 1 | Status: SHIPPED | OUTPATIENT
Start: 2022-06-07

## 2022-06-07 NOTE — PROGRESS NOTES
Assessment/Plan:    No problem-specific Assessment & Plan notes found for this encounter  Diagnoses and all orders for this visit:    Chronic midline low back pain with left-sided sciatica  -     predniSONE 20 mg tablet; 2 PO QD X 4 DAYS, THEN 1 PO QD X 4 DAYS, THEN 1/2 PO QD X 4 DAYS  -     MRI lumbar spine wo contrast; Future  -     Ambulatory Referral to Pain Management; Future    Encounter for immunization  -     TDAP VACCINE GREATER THAN OR EQUAL TO 8YO IM        Patient Instructions   REST  AVOID LIFTING OR PUSHING    TRIAL OF PREDNISONE    MRI  PAIN MANAGEMENT WILL BE OBTAINED    FURTHER PLANS PENDING EVAL AND RESPONSE TO TREATMENT      Return in about 4 weeks (around 7/5/2022) for Recheck  Subjective:      Patient ID: Marcellus Alcala is a 77 y o  male  Chief Complaint   Patient presents with    Follow-up     Ongoing  lower back pain        Back Pain  This is a chronic problem  The current episode started more than 1 month ago  The problem occurs constantly  The problem has been gradually worsening since onset  The pain is present in the lumbar spine  The pain radiates to the left thigh  The pain is mild  The pain is the same all the time  The symptoms are aggravated by bending, position and twisting  Stiffness is present all day  Associated symptoms include leg pain, numbness, paresthesias and tingling  Pertinent negatives include no abdominal pain, bladder incontinence, bowel incontinence, chest pain, dysuria, fever, headaches, paresis, pelvic pain, perianal numbness, weakness or weight loss  Risk factors include sedentary lifestyle  He has tried NSAIDs for the symptoms  The treatment provided no relief         The following portions of the patient's history were reviewed and updated as appropriate: allergies, current medications, past family history, past medical history, past social history, past surgical history and problem list     Review of Systems   Constitutional: Negative for chills, fatigue, fever and weight loss  HENT: Negative for sore throat  Eyes: Negative for discharge  Respiratory: Negative for cough and chest tightness  Cardiovascular: Negative for chest pain and palpitations  Gastrointestinal: Negative for abdominal pain, bowel incontinence, diarrhea, nausea and vomiting  Genitourinary: Negative for bladder incontinence, dysuria and pelvic pain  Musculoskeletal: Positive for back pain  Negative for arthralgias and gait problem  Neurological: Positive for tingling, numbness and paresthesias  Negative for dizziness, weakness and headaches  Hematological: Negative for adenopathy  Psychiatric/Behavioral: The patient is not nervous/anxious  Current Outpatient Medications   Medication Sig Dispense Refill    Asmanex, 30 Metered Doses, 110 MCG/INH AEPB inhaler INHALE 1 PUFF TWICE DAILY  RINSE MOUTH AFTER USE 3 each 3    finasteride (PROSCAR) 5 mg tablet Take 5 mg by mouth daily      loratadine (CLARITIN) 10 mg tablet Take 10 mg by mouth daily      Mirabegron ER 50 MG TB24 Take 1 tablet (50 mg total) by mouth in the morning 90 tablet 3    multivitamin (THERAGRAN) TABS Take 1 tablet by mouth daily      nabumetone (RELAFEN) 750 mg tablet TAKE 1 TABLET(750 MG) BY MOUTH TWICE DAILY 180 tablet 1    Omega-3 Fatty Acids (FISH OIL PO) Take by mouth in the morning      predniSONE 20 mg tablet 2 PO QD X 4 DAYS, THEN 1 PO QD X 4 DAYS, THEN 1/2 PO QD X 4 DAYS 14 tablet 0    rosuvastatin (CRESTOR) 20 MG tablet Take 1 tablet (20 mg total) by mouth daily 90 tablet 3    zolpidem (AMBIEN) 10 mg tablet Take 1 tablet (10 mg total) by mouth daily at bedtime as needed for sleep 30 tablet 3     No current facility-administered medications for this visit         Objective:    /66   Pulse 73   Temp (!) 97 3 °F (36 3 °C) (Temporal)   Resp 16   Ht 6' (1 829 m)   Wt 86 2 kg (190 lb)   SpO2 98%   BMI 25 77 kg/m²        Physical Exam  Constitutional:       Appearance: He is well-developed  HENT:      Head: Normocephalic and atraumatic  Eyes:      General:         Right eye: No discharge  Left eye: No discharge  Conjunctiva/sclera: Conjunctivae normal       Pupils: Pupils are equal, round, and reactive to light  Neck:      Thyroid: No thyromegaly  Vascular: No JVD  Cardiovascular:      Rate and Rhythm: Normal rate and regular rhythm  Heart sounds: Normal heart sounds  No murmur heard  Pulmonary:      Effort: Pulmonary effort is normal       Breath sounds: Normal breath sounds  No wheezing or rales  Abdominal:      General: Bowel sounds are normal       Palpations: Abdomen is soft  There is no mass  Tenderness: There is no abdominal tenderness  There is no guarding or rebound  Musculoskeletal:         General: Tenderness present  No deformity  Cervical back: Neck supple  Comments: MILD LOWER LUMBAR TENDERNESS  L SCIATIC [address] TENDERNESS  POS SLR ON L    REFLEXES SILENT   Lymphadenopathy:      Cervical: No cervical adenopathy  Skin:     General: Skin is warm and dry  Findings: No erythema or rash  Neurological:      Mental Status: He is alert and oriented to person, place, and time  Psychiatric:         Behavior: Behavior normal          Thought Content:  Thought content normal          Judgment: Judgment normal                 Shun Lr MD

## 2022-06-07 NOTE — PATIENT INSTRUCTIONS
REST  AVOID LIFTING OR PUSHING    TRIAL OF PREDNISONE    MRI  PAIN MANAGEMENT WILL BE OBTAINED    FURTHER PLANS PENDING EVAL AND RESPONSE TO TREATMENT

## 2022-06-29 ENCOUNTER — HOSPITAL ENCOUNTER (OUTPATIENT)
Dept: RADIOLOGY | Facility: HOSPITAL | Age: 77
Discharge: HOME | End: 2022-06-29
Payer: COMMERCIAL

## 2022-06-29 DIAGNOSIS — M54.42 CHRONIC MIDLINE LOW BACK PAIN WITH LEFT-SIDED SCIATICA: ICD-10-CM

## 2022-06-29 DIAGNOSIS — G89.29 CHRONIC MIDLINE LOW BACK PAIN WITH LEFT-SIDED SCIATICA: ICD-10-CM

## 2022-06-29 PROCEDURE — 72148 MRI LUMBAR SPINE W/O DYE: CPT

## 2022-06-29 PROCEDURE — G1004 CDSM NDSC: HCPCS

## 2022-07-05 ENCOUNTER — OFFICE VISIT (OUTPATIENT)
Dept: FAMILY MEDICINE CLINIC | Facility: CLINIC | Age: 77
End: 2022-07-05
Payer: COMMERCIAL

## 2022-07-05 VITALS
BODY MASS INDEX: 26.14 KG/M2 | DIASTOLIC BLOOD PRESSURE: 72 MMHG | OXYGEN SATURATION: 98 % | HEIGHT: 72 IN | SYSTOLIC BLOOD PRESSURE: 130 MMHG | WEIGHT: 193 LBS | HEART RATE: 72 BPM | TEMPERATURE: 97.1 F | RESPIRATION RATE: 12 BRPM

## 2022-07-05 DIAGNOSIS — E78.2 MIXED HYPERLIPIDEMIA: ICD-10-CM

## 2022-07-05 DIAGNOSIS — G89.29 CHRONIC MIDLINE LOW BACK PAIN WITH LEFT-SIDED SCIATICA: Primary | ICD-10-CM

## 2022-07-05 DIAGNOSIS — G47.9 DISTURBANCE OF SLEEP: ICD-10-CM

## 2022-07-05 DIAGNOSIS — M15.9 GENERALIZED OSTEOARTHRITIS OF MULTIPLE SITES: ICD-10-CM

## 2022-07-05 DIAGNOSIS — I10 PRIMARY HYPERTENSION: ICD-10-CM

## 2022-07-05 DIAGNOSIS — M54.42 CHRONIC MIDLINE LOW BACK PAIN WITH LEFT-SIDED SCIATICA: Primary | ICD-10-CM

## 2022-07-05 PROCEDURE — 99214 OFFICE O/P EST MOD 30 MIN: CPT | Performed by: FAMILY MEDICINE

## 2022-07-05 RX ORDER — ZOLPIDEM TARTRATE 10 MG/1
10 TABLET ORAL
Qty: 30 TABLET | Refills: 3 | Status: SHIPPED | OUTPATIENT
Start: 2022-07-05 | End: 2022-09-06

## 2022-07-05 RX ORDER — PREDNISONE 20 MG/1
TABLET ORAL
Qty: 14 TABLET | Refills: 0 | Status: SHIPPED | OUTPATIENT
Start: 2022-07-05 | End: 2022-09-06 | Stop reason: ALTCHOICE

## 2022-07-05 RX ORDER — TRAZODONE HYDROCHLORIDE 100 MG/1
100 TABLET ORAL
Qty: 30 TABLET | Refills: 3 | Status: SHIPPED | OUTPATIENT
Start: 2022-07-05 | End: 2022-09-12 | Stop reason: SDUPTHER

## 2022-07-05 NOTE — PROGRESS NOTES
Assessment/Plan:    Hyperlipidemia  WATCHING CHOLESTEROL INTAKE  COMPLIANT WITH MEDICATION  NO CONCERNS    - CONTINUE TO MONITOR DIETARY CHOL INTAKE  - CONTINUE CURRENT MEDICATION  - ENCOURAGED PHYSICAL ACTIVITY        Hypertension  STABLE  DENIES ANY CP, SOB, PALPITATIONS, OR HEADACHE  NOTES NO WATER RETENTION  COMPLIANT WITH MEDICATION  NO CONCERNS    - CONTINUE CURRENT TREATMENT PLAN  - MONITOR DIETARY SODIUM INTAKE  - ENCOURAGE PHYSICAL ACTIVITY  - RV 3 MONTHS       Diagnoses and all orders for this visit:    Chronic midline low back pain with left-sided sciatica  -     predniSONE 20 mg tablet; 2 PO QD X 4 DAYS, THEN 1 PO QD X 4 DAYS, THEN 1/2 PO QD X 4 DAYS    Generalized osteoarthritis of multiple sites    Primary hypertension    Mixed hyperlipidemia    Disturbance of sleep  -     zolpidem (AMBIEN) 10 mg tablet; Take 1 tablet (10 mg total) by mouth daily at bedtime as needed for sleep  -     traZODone (DESYREL) 100 mg tablet; Take 1 tablet (100 mg total) by mouth daily at bedtime        Patient Instructions   REST  NO LIFTING OR PUSHING    TRIAL OF PREDNISONE    PAIN CONSULT PENDING    FURTHER PLANS PENDING EVAL      Return if symptoms worsen or fail to improve, for Next scheduled follow up  Subjective:      Patient ID: Junior Kumar is a 77 y o  male  Chief Complaint   Patient presents with    Follow-up     Pt here for a four week back pain f/u  FOLLOW UP    REVIEWED MRI RESULTS  STILL IN A LOT OF PAIN  RADIATES DOWN BOTH LEGS L> R  NO TINGLING    REVIEWED OPTIONS  PAIN MANAGEMENT CONSULT PENDING      REVIEWED MEDICAL ISSUES      The following portions of the patient's history were reviewed and updated as appropriate: allergies, current medications, past family history, past medical history, past social history, past surgical history and problem list     Review of Systems   Constitutional: Negative for chills, fatigue and fever     HENT: Negative for congestion, ear discharge, ear pain, mouth sores, postnasal drip, sore throat and trouble swallowing  Eyes: Negative for pain, discharge and visual disturbance  Respiratory: Negative for cough, shortness of breath and wheezing  Cardiovascular: Negative for chest pain, palpitations and leg swelling  Gastrointestinal: Negative for abdominal distention, abdominal pain, blood in stool, diarrhea and nausea  Endocrine: Negative for polydipsia, polyphagia and polyuria  Genitourinary: Negative for dysuria, frequency, hematuria and urgency  Musculoskeletal: Positive for back pain and gait problem  Negative for arthralgias and joint swelling  Skin: Negative for pallor and rash  Neurological: Negative for dizziness, syncope, speech difficulty, weakness, light-headedness, numbness and headaches  Hematological: Negative for adenopathy  Psychiatric/Behavioral: Negative for behavioral problems, confusion and sleep disturbance  The patient is not nervous/anxious  Current Outpatient Medications   Medication Sig Dispense Refill    Asmanex, 30 Metered Doses, 110 MCG/INH AEPB inhaler INHALE 1 PUFF TWICE DAILY   RINSE MOUTH AFTER USE 3 each 3    finasteride (PROSCAR) 5 mg tablet Take 5 mg by mouth daily      loratadine (CLARITIN) 10 mg tablet Take 10 mg by mouth daily      Mirabegron ER 50 MG TB24 Take 1 tablet (50 mg total) by mouth in the morning 90 tablet 3    multivitamin (THERAGRAN) TABS Take 1 tablet by mouth daily      nabumetone (RELAFEN) 750 mg tablet TAKE 1 TABLET(750 MG) BY MOUTH TWICE DAILY 180 tablet 1    Omega-3 Fatty Acids (FISH OIL PO) Take by mouth in the morning      predniSONE 20 mg tablet 2 PO QD X 4 DAYS, THEN 1 PO QD X 4 DAYS, THEN 1/2 PO QD X 4 DAYS 14 tablet 0    rosuvastatin (CRESTOR) 20 MG tablet Take 1 tablet (20 mg total) by mouth daily 90 tablet 3    traZODone (DESYREL) 100 mg tablet Take 1 tablet (100 mg total) by mouth daily at bedtime 30 tablet 3    zolpidem (AMBIEN) 10 mg tablet Take 1 tablet (10 mg total) by mouth daily at bedtime as needed for sleep 30 tablet 3     No current facility-administered medications for this visit  Objective:    /72 (BP Location: Left arm, Patient Position: Sitting, Cuff Size: Large)   Pulse 72   Temp (!) 97 1 °F (36 2 °C) (Temporal)   Resp 12   Ht 6' (1 829 m)   Wt 87 5 kg (193 lb)   SpO2 98%   BMI 26 18 kg/m²        Physical Exam  Constitutional:       Appearance: He is well-developed  HENT:      Head: Normocephalic and atraumatic  Eyes:      General:         Right eye: No discharge  Left eye: No discharge  Conjunctiva/sclera: Conjunctivae normal       Pupils: Pupils are equal, round, and reactive to light  Neck:      Thyroid: No thyromegaly  Vascular: No JVD  Cardiovascular:      Rate and Rhythm: Normal rate and regular rhythm  Heart sounds: Normal heart sounds  No murmur heard  Pulmonary:      Effort: Pulmonary effort is normal       Breath sounds: Normal breath sounds  No wheezing or rales  Abdominal:      General: Bowel sounds are normal       Palpations: Abdomen is soft  There is no mass  Tenderness: There is no abdominal tenderness  There is no guarding or rebound  Musculoskeletal:         General: Tenderness present  No deformity  Normal range of motion  Cervical back: Neck supple  Comments: MILD LOWER LUMBAR TENDERNESS  REFLEXES PRESERVED  MILD SLR ON L   Lymphadenopathy:      Cervical: No cervical adenopathy  Skin:     General: Skin is warm and dry  Findings: No erythema or rash  Neurological:      General: No focal deficit present  Mental Status: He is alert and oriented to person, place, and time  Psychiatric:         Behavior: Behavior normal          Thought Content:  Thought content normal          Judgment: Judgment normal                 Ben Mcelroy MD

## 2022-07-05 NOTE — PATIENT INSTRUCTIONS
REST  NO LIFTING OR PUSHING    TRIAL OF PREDNISONE    PAIN CONSULT PENDING    FURTHER PLANS PENDING EVAL

## 2022-07-11 NOTE — PROGRESS NOTES
Yonathan Enriquez has not been seen in OPPT for greater than 30 days, at this time he will be formally discharged

## 2022-07-12 DIAGNOSIS — E78.2 MIXED HYPERLIPIDEMIA: ICD-10-CM

## 2022-07-12 DIAGNOSIS — I10 PRIMARY HYPERTENSION: ICD-10-CM

## 2022-07-12 DIAGNOSIS — Z12.5 ENCOUNTER FOR PROSTATE CANCER SCREENING: ICD-10-CM

## 2022-07-12 DIAGNOSIS — N40.0 HYPERPLASIA OF PROSTATE: ICD-10-CM

## 2022-07-12 DIAGNOSIS — Z00.00 MEDICARE ANNUAL WELLNESS VISIT, SUBSEQUENT: Primary | ICD-10-CM

## 2022-07-25 ENCOUNTER — OFFICE VISIT (OUTPATIENT)
Dept: PAIN MEDICINE | Facility: CLINIC | Age: 77
End: 2022-07-25
Payer: COMMERCIAL

## 2022-07-25 VITALS
HEART RATE: 78 BPM | TEMPERATURE: 98.2 F | HEIGHT: 74 IN | RESPIRATION RATE: 19 BRPM | DIASTOLIC BLOOD PRESSURE: 75 MMHG | SYSTOLIC BLOOD PRESSURE: 128 MMHG | WEIGHT: 198 LBS | BODY MASS INDEX: 25.41 KG/M2

## 2022-07-25 DIAGNOSIS — M47.816 LUMBAR SPONDYLOSIS: ICD-10-CM

## 2022-07-25 DIAGNOSIS — M54.41 CHRONIC BILATERAL LOW BACK PAIN WITH BILATERAL SCIATICA: ICD-10-CM

## 2022-07-25 DIAGNOSIS — M54.16 LUMBAR RADICULOPATHY: ICD-10-CM

## 2022-07-25 DIAGNOSIS — M48.062 SPINAL STENOSIS OF LUMBAR REGION WITH NEUROGENIC CLAUDICATION: ICD-10-CM

## 2022-07-25 DIAGNOSIS — G89.29 CHRONIC BILATERAL LOW BACK PAIN WITH BILATERAL SCIATICA: ICD-10-CM

## 2022-07-25 DIAGNOSIS — G89.4 CHRONIC PAIN SYNDROME: Primary | ICD-10-CM

## 2022-07-25 DIAGNOSIS — M54.42 CHRONIC BILATERAL LOW BACK PAIN WITH BILATERAL SCIATICA: ICD-10-CM

## 2022-07-25 PROCEDURE — 3074F SYST BP LT 130 MM HG: CPT | Performed by: ANESTHESIOLOGY

## 2022-07-25 PROCEDURE — 99204 OFFICE O/P NEW MOD 45 MIN: CPT | Performed by: ANESTHESIOLOGY

## 2022-07-25 PROCEDURE — 3078F DIAST BP <80 MM HG: CPT | Performed by: ANESTHESIOLOGY

## 2022-07-25 PROCEDURE — 1160F RVW MEDS BY RX/DR IN RCRD: CPT | Performed by: ANESTHESIOLOGY

## 2022-07-25 RX ORDER — LANOLIN ALCOHOL/MO/W.PET/CERES
1 CREAM (GRAM) TOPICAL 3 TIMES DAILY
COMMUNITY

## 2022-07-25 NOTE — H&P (VIEW-ONLY)
Assessment:  1  Chronic pain syndrome    2  Chronic bilateral low back pain with bilateral sciatica    3  Lumbar radiculopathy    4  Spinal stenosis of lumbar region with neurogenic claudication    5  Lumbar spondylosis        Plan:  New Medications Ordered This Visit   Medications    glucosamine-chondroitin 500-400 MG tablet     Sig: Take 1 tablet by mouth 3 (three) times a day     My impressions and treatment recommendations were discussed in detail with the patient, who verbalized understanding and had no further questions  Given that the patient has signs and symptoms of low back pain and bilateral lower extremity radiculopathy in the context of lumbar spinal stenosis, I discussed the rationale of undergoing a L5-S1 lumbar epidural steroid injection since this could be potentially therapeutic  Complete risks and benefits including bleeding, infection, tissue reaction, nerve injury and allergic reaction were discussed  The approach was demonstrated using models and literature was provided  Verbal and written consent was obtained  In addition, I did feel reasonable to discuss with the patient that he may be a candidate for medial branch blocks as well as lumbar facet radiofrequency ablations if he does not receive complete pain relief after the epidural steroid injection  The patient verbalized understanding and we will consider this option for him in the future if need be  Follow-up is planned in 4 weeks time or sooner as warranted  Discharge instructions were provided  I personally saw and examined the patient and I agree with the above discussed plan of care  History of Present Illness:    Juan Mcgarry is a 77 y o  male who presents to [facility] for initial evaluation of the above stated pain complaints  The patient has a past medical and chronic pain history as outlined in the assessment section  He was referred by Dr Lashawn Matthews      At today's office visit, the patient's pain score is 6/10 on the verbal numerical pain rating scale  The patient states that his pain is moderate to severe and constant in nature  He states that there is no typical pattern to his pain  He describes his pain as shooting and dull/aching  He reports ambulating without any assistive devices  Lying down, sitting, and relaxation decreases pain  Standing, bending, walking, and exercise increases pain  Physical therapy provides no pain relief  Nabumetone is providing the patient pain relief currently  Oral steroids did provide pain relief in the past     Review of Systems:    Review of Systems   Constitutional: Negative for chills and fatigue  HENT: Positive for hearing loss  Negative for ear pain, mouth sores and sinus pressure  Eyes: Negative for pain, redness and visual disturbance  Respiratory: Negative for shortness of breath and wheezing  Cardiovascular: Negative for chest pain and palpitations  Gastrointestinal: Negative for abdominal pain and nausea  Endocrine: Negative for polyphagia  Genitourinary: Positive for frequency  Urine   Musculoskeletal: Positive for back pain, gait problem and joint swelling  Negative for arthralgias and neck pain  Joint pain in back   Skin: Negative for wound  Neurological: Negative for seizures and weakness  Psychiatric/Behavioral: Negative for dysphoric mood and sleep disturbance           Patient Active Problem List   Diagnosis    Disturbance of sleep    Generalized osteoarthritis of multiple sites    Hyperlipidemia    Hypertension    Mild intermittent asthma without complication    Sensorineural hearing loss    Encounter for prostate cancer screening    Colon cancer screening    Screening for hypothyroidism    Medicare annual wellness visit, subsequent    Need for influenza vaccination    Chronic midline low back pain    Chronic midline low back pain with left-sided sciatica    Chronic pain syndrome    Lumbar radiculopathy    Spinal stenosis of lumbar region with neurogenic claudication    Lumbar spondylosis       Past Medical History:   Diagnosis Date    Cataract     resolved: 2016    Glaucoma     Sciatica, right side     last assessed: 2016       Past Surgical History:   Procedure Laterality Date    TONSILLECTOMY         Family History   Problem Relation Age of Onset    Coronary artery disease Mother     Cancer Mother     Coronary artery disease Father     Substance Abuse Daughter     Mental illness Neg Hx        Social History     Occupational History    Not on file   Tobacco Use    Smoking status: Former Smoker     Quit date:      Years since quittin 5    Smokeless tobacco: Never Used   Vaping Use    Vaping Use: Never used   Substance and Sexual Activity    Alcohol use: No    Drug use: No    Sexual activity: Not on file         Current Outpatient Medications:     Asmanex, 30 Metered Doses, 110 MCG/INH AEPB inhaler, INHALE 1 PUFF TWICE DAILY   RINSE MOUTH AFTER USE, Disp: 3 each, Rfl: 3    finasteride (PROSCAR) 5 mg tablet, Take 5 mg by mouth daily, Disp: , Rfl:     glucosamine-chondroitin 500-400 MG tablet, Take 1 tablet by mouth 3 (three) times a day, Disp: , Rfl:     loratadine (CLARITIN) 10 mg tablet, Take 10 mg by mouth daily, Disp: , Rfl:     Mirabegron ER 50 MG TB24, Take 1 tablet (50 mg total) by mouth in the morning, Disp: 90 tablet, Rfl: 3    multivitamin (THERAGRAN) TABS, Take 1 tablet by mouth daily, Disp: , Rfl:     nabumetone (RELAFEN) 750 mg tablet, TAKE 1 TABLET(750 MG) BY MOUTH TWICE DAILY, Disp: 180 tablet, Rfl: 1    Omega-3 Fatty Acids (FISH OIL PO), Take by mouth in the morning, Disp: , Rfl:     rosuvastatin (CRESTOR) 20 MG tablet, Take 1 tablet (20 mg total) by mouth daily, Disp: 90 tablet, Rfl: 3    traZODone (DESYREL) 100 mg tablet, Take 1 tablet (100 mg total) by mouth daily at bedtime, Disp: 30 tablet, Rfl: 3    zolpidem (AMBIEN) 10 mg tablet, Take 1 tablet (10 mg total) by mouth daily at bedtime as needed for sleep, Disp: 30 tablet, Rfl: 3    predniSONE 20 mg tablet, 2 PO QD X 4 DAYS, THEN 1 PO QD X 4 DAYS, THEN 1/2 PO QD X 4 DAYS (Patient not taking: Reported on 7/25/2022), Disp: 14 tablet, Rfl: 0    Allergies   Allergen Reactions    Pollen Extract Nasal Congestion       Physical Exam:    /75   Pulse 78   Temp 98 2 °F (36 8 °C)   Resp 19   Ht 6' 2" (1 88 m)   Wt 89 8 kg (198 lb)   BMI 25 42 kg/m²     Constitutional: normal, well developed, well nourished, alert, in no distress and non-toxic and no overt pain behavior  Eyes: anicteric  HEENT: grossly intact  Neck: supple, symmetric, trachea midline and no masses   Pulmonary:even and unlabored  Cardiovascular:No edema or pitting edema present  Skin:Normal without rashes or lesions and well hydrated  Psychiatric:Mood and affect appropriate  Neurologic:Cranial Nerves II-XII grossly intact  Musculoskeletal:normal     Lumbar Spine Exam    Appearance:  Normal lordosis  Palpation/Tenderness:  no tenderness or spasm  Sensory:  no sensory deficits noted  Range of Motion:  Flexion:   Moderately limited  with pain  Extension:  Moderately limited  with pain  Lateral Flexion - Left:  Moderately limited  with pain  Lateral Flexion - Right:  Moderately limited  with pain  Rotation - Left:  Moderately limited  with pain  Rotation - Right:  Moderately limited  with pain   Lumbar facet loading is negative bilaterally  Motor Strength:  Left hip flexion:  5/5  Left hip extension:  5/5  Right hip flexion:  5/5  Right hip extension:  5/5  Left knee flexion:  5/5  Left knee extension:  5/5  Right knee flexion:  5/5  Right knee extension:  5/5  Left foot dorsiflexion:  5/5  Left foot plantar flexion:  5/5  Right foot dorsiflexion:  5/5  Right foot plantar flexion:  5/5  Reflexes:  Left Patellar:  2+   Right Patellar:  2+   Left Achilles:  2+   Right Achilles:  2+   Special Tests:  Left Straight Leg Test:  negative  Right Straight Leg Test:  negative  Left Abhinav's Maneuver:  negative  Right Abhinav's Maneuver:  negative    Imaging      MRI lumbar spine wo contrast    Status: Final result               PACS Images     Show images for MRI lumbar spine wo contrast      MRI lumbar spine wo contrast: Result Giuliano     Timajennifer Masharukhsana   7/5/2022  9:21 AM EDT         I spoke to the pt, he states he is feeling ok  He does have an appt this afternoon  No further action needed  Neo Daley MD   7/2/2022 12:58 PM EDT         PLEASE CALL JOHANNE     MRI SHOWS A LOT OF ARTHRITIS AND DEGENERATIVE CHANGES  SOME SPINAL STENOSIS NOTED     HOW IS HE FEELING?              Study Result    Narrative & Impression   MRI LUMBAR SPINE WITHOUT CONTRAST     INDICATION: M54 42: Lumbago with sciatica, left side  G89 29: Other chronic pain      COMPARISON:  None      TECHNIQUE:  Sagittal T1, sagittal T2, sagittal inversion recovery, axial T1 and axial T2, coronal T2     IMAGE QUALITY:  Diagnostic     FINDINGS:     VERTEBRAL BODIES:  There are 5 lumbar type vertebral bodies  Normal alignment  No spondylolisthesis or spondylolysis  Minimal dextroscoliosis of the lower lumbar spine  There is a mild compression deformity of the L1 superior endplate, chronic with   no associated marrow edema      There is a deformity of the inferior endplate of the L2 vertebral body which has the appearance of Schmorl's nodes, demonstrating mild edema      SACRUM:  Normal signal within the sacrum   No evidence of insufficiency or stress fracture      DISTAL CORD AND CONUS:  Normal size and signal within the distal cord and conus      PARASPINAL SOFT TISSUES:  Paraspinal soft tissues are unremarkable      LOWER THORACIC DISC SPACES:  Normal disc height and signal   No disc herniation, canal stenosis or foraminal narrowing      LUMBAR DISC SPACES:     L1-L2:  Normal      L2-L3:  There is mild annular bulging and mild bilateral facet arthropathy  Small right-sided facet effusion  There is a synovial cyst extending medially from the right facet joint into the central canal with mild mass effect upon the thecal sac, see   series 6 image 16  This measures approximately 6 mm in maximum dimension  Mild canal stenosis and mild bilateral foraminal narrowing, right greater than left      L3-L4:  Normal disc height and signal   Mild bilateral facet arthropathy  There is no canal stenosis or foraminal narrowing      L4-L5:  Disc desiccation  Mild annular bulging with moderate facet degenerative change and ligamentum flavum thickening  Moderate tricompartmental canal stenosis with narrowing of the anterolateral recesses of the thecal sac  Mild foraminal narrowing  Correlate for bilateral L5 radiculopathy      L5-S1:  Normal disc height and signal   No disc herniation  Mild facet arthropathy  No canal stenosis  Minor no foraminal narrowing      IMPRESSION:     Lumbar degenerative disc disease most pronounced at the L2-3 and L4-5 levels  Canal stenosis is most pronounced at L4-5 due to disc and posterior element hypertrophic change with narrowing of the anterior lateral recesses  Correlate for L5   radiculopathy      Small synovial cyst at the L2-3 level         Workstation performed: [id number]        Imaging    MRI lumbar spine wo contrast (Order: [id number]) - 6/29/2022    Result History    MRI lumbar spine wo contrast (Order #[id number]) on 6/30/2022 - Order Result History Report    Order Report     Order Details      Order Questions    Question Answer   What is the patient's sedation requirement? No Sedation   Metallic implants? No   Note: Answer Yes or No   Does this procedure require the 3T MRI at [facility]? No   [facility]   Is order priority selected as STAT?  No   Exam reason CHRONIC LOW BACK PAIN WITH L SIDED SCIATICA S/P X RAY AND PT WITHOUT IMPROVEMENT   Note: Enter reason for exam              Result Information    Status Priority Source   Final result (6/30/2022  1:54 PM) Routine        MRI lumbar spine wo contrast: Result Notes     Stefan Mecca   7/5/2022  9:21 AM EDT         I spoke to the pt, he states he is feeling ok  He does have an appt this afternoon  No further action needed  Kelsey Salvador MD   7/2/2022 12:58 PM EDT         PLEASE CALL JOHANNE     MRI SHOWS A LOT OF ARTHRITIS AND DEGENERATIVE CHANGES  SOME SPINAL STENOSIS NOTED     HOW IS HE FEELING?            Reason for Exam    CHRONIC LOW BACK PAIN WITH L SIDED SCIATICA S/P X RAY AND PT WITHOUT IMPROVEMENT; Low back pain, symptoms persist with > 6wks conservative treatment; CHRONIC LOW BACK PAIN WITH L SIDED SCIATICA S/P X RAY AND PT WITHOUT IMPROVEMENT   Dx: Chronic midline low back pain with left-sided sciatica [M54 42, G89 29 (ICD-10-CM)]     All Reviewers List    Kelsey Salvador MD on 7/5/2022 11:37 AM   Stefan Wing on 7/5/2022  9:21 AM   Kelsey Salvador MD on 7/2/2022 12:58 PM         MRI lumbar spine wo contrast: Patient Communication     Add Comments   Seen         Signed by    Signed Date/Time  Phone Pager   Candice De La Fuente 6/30/2022 13:54 [phone]        Exam Information    Status Exam Begun  Exam Ended  Performing Tech   Final [99] 6/29/2022 14:58 6/29/2022 15:28 Brandi Rouavery       Screening Form Questions     important suggestion  Questionnaires are displayed in the order they were answered  Answer Comment   What are your symptoms? Lower back and hip pain    Do you have a cardiac pacemaker or internal defibrillator? NA    Please list /make/model:     Have you had any HEART surgery? None    Please list make/model:     Heart surgery: If "other", please describe:     Have you had any BRAIN surgery? None    Please list  or describe implant:     Brain surgery: If "other", please describe:     Have you had any EYE surgery?  Cataracts    Eye surgery: If "other", please describe: Have you ever injured your eyes with metal or metal fragments (grinding,metallic slivers)? No    Eye injury: Please describe:     Have you had any EAR surgery? None    Ear surgery: If "other", please describe:      Do you have an electronic "stimulation" device? None    Please provide  information:     Have you had any abdominal or pelvic surgery? No    Have you had any of the following abdominal or pelvic surgeries:     Abdominal/pelvic surgery: If "other", please describe:     Do you have any ORTHOPEDIC implants/devices? None    Do you have the following: None    Do you have liver disease? None    Do you have kidney disease? None    Have you had a problem with a previous MRI? No    Does the patient require pre-medication? Do you have a personal history of cancer? None    If "other", please specify:       Are you wearing medication patches?   No    Are you wearing any of the following: None    Did the patient provide this information? Yes    Who provided this information (if not the patient)? Relationship to the patient:     Contact number:     MRI STAFF ONLY- Prior imaging reviewed in PACS (initials): de    MRI STAFF ONLY- Epic chart reviewed (initials): de/  fds screened     MRI STAFF ONLY: The patient was scheduled to receive MRI contrast and has received the Medication Guide  No contrast given        External Results Report    Open External Results Report      Encounter    View Encounter                     Patient Care Timeline    No data selected in time range    Pre-op Summary    Pre-op             Recovery Summary    Recovery                 Routing History    None         No orders to display       No orders of the defined types were placed in this encounter

## 2022-07-25 NOTE — PROGRESS NOTES
Assessment:  1  Chronic pain syndrome    2  Chronic bilateral low back pain with bilateral sciatica    3  Lumbar radiculopathy    4  Spinal stenosis of lumbar region with neurogenic claudication    5  Lumbar spondylosis        Plan:  New Medications Ordered This Visit   Medications    glucosamine-chondroitin 500-400 MG tablet     Sig: Take 1 tablet by mouth 3 (three) times a day     My impressions and treatment recommendations were discussed in detail with the patient, who verbalized understanding and had no further questions  Given that the patient has signs and symptoms of low back pain and bilateral lower extremity radiculopathy in the context of lumbar spinal stenosis, I discussed the rationale of undergoing a L5-S1 lumbar epidural steroid injection since this could be potentially therapeutic  Complete risks and benefits including bleeding, infection, tissue reaction, nerve injury and allergic reaction were discussed  The approach was demonstrated using models and literature was provided  Verbal and written consent was obtained  In addition, I did feel reasonable to discuss with the patient that he may be a candidate for medial branch blocks as well as lumbar facet radiofrequency ablations if he does not receive complete pain relief after the epidural steroid injection  The patient verbalized understanding and we will consider this option for him in the future if need be  Follow-up is planned in 4 weeks time or sooner as warranted  Discharge instructions were provided  I personally saw and examined the patient and I agree with the above discussed plan of care  History of Present Illness:    Tanner Stoll is a 77 y o  male who presents to [facility] for initial evaluation of the above stated pain complaints  The patient has a past medical and chronic pain history as outlined in the assessment section  He was referred by Dr Tae Cortez      At today's office visit, the patient's pain score is 6/10 on the verbal numerical pain rating scale  The patient states that his pain is moderate to severe and constant in nature  He states that there is no typical pattern to his pain  He describes his pain as shooting and dull/aching  He reports ambulating without any assistive devices  Lying down, sitting, and relaxation decreases pain  Standing, bending, walking, and exercise increases pain  Physical therapy provides no pain relief  Nabumetone is providing the patient pain relief currently  Oral steroids did provide pain relief in the past     Review of Systems:    Review of Systems   Constitutional: Negative for chills and fatigue  HENT: Positive for hearing loss  Negative for ear pain, mouth sores and sinus pressure  Eyes: Negative for pain, redness and visual disturbance  Respiratory: Negative for shortness of breath and wheezing  Cardiovascular: Negative for chest pain and palpitations  Gastrointestinal: Negative for abdominal pain and nausea  Endocrine: Negative for polyphagia  Genitourinary: Positive for frequency  Urine   Musculoskeletal: Positive for back pain, gait problem and joint swelling  Negative for arthralgias and neck pain  Joint pain in back   Skin: Negative for wound  Neurological: Negative for seizures and weakness  Psychiatric/Behavioral: Negative for dysphoric mood and sleep disturbance           Patient Active Problem List   Diagnosis    Disturbance of sleep    Generalized osteoarthritis of multiple sites    Hyperlipidemia    Hypertension    Mild intermittent asthma without complication    Sensorineural hearing loss    Encounter for prostate cancer screening    Colon cancer screening    Screening for hypothyroidism    Medicare annual wellness visit, subsequent    Need for influenza vaccination    Chronic midline low back pain    Chronic midline low back pain with left-sided sciatica    Chronic pain syndrome    Lumbar radiculopathy    Spinal stenosis of lumbar region with neurogenic claudication    Lumbar spondylosis       Past Medical History:   Diagnosis Date    Cataract     resolved: 2016    Glaucoma     Sciatica, right side     last assessed: 2016       Past Surgical History:   Procedure Laterality Date    TONSILLECTOMY         Family History   Problem Relation Age of Onset    Coronary artery disease Mother     Cancer Mother     Coronary artery disease Father     Substance Abuse Daughter     Mental illness Neg Hx        Social History     Occupational History    Not on file   Tobacco Use    Smoking status: Former Smoker     Quit date:      Years since quittin 5    Smokeless tobacco: Never Used   Vaping Use    Vaping Use: Never used   Substance and Sexual Activity    Alcohol use: No    Drug use: No    Sexual activity: Not on file         Current Outpatient Medications:     Asmanex, 30 Metered Doses, 110 MCG/INH AEPB inhaler, INHALE 1 PUFF TWICE DAILY   RINSE MOUTH AFTER USE, Disp: 3 each, Rfl: 3    finasteride (PROSCAR) 5 mg tablet, Take 5 mg by mouth daily, Disp: , Rfl:     glucosamine-chondroitin 500-400 MG tablet, Take 1 tablet by mouth 3 (three) times a day, Disp: , Rfl:     loratadine (CLARITIN) 10 mg tablet, Take 10 mg by mouth daily, Disp: , Rfl:     Mirabegron ER 50 MG TB24, Take 1 tablet (50 mg total) by mouth in the morning, Disp: 90 tablet, Rfl: 3    multivitamin (THERAGRAN) TABS, Take 1 tablet by mouth daily, Disp: , Rfl:     nabumetone (RELAFEN) 750 mg tablet, TAKE 1 TABLET(750 MG) BY MOUTH TWICE DAILY, Disp: 180 tablet, Rfl: 1    Omega-3 Fatty Acids (FISH OIL PO), Take by mouth in the morning, Disp: , Rfl:     rosuvastatin (CRESTOR) 20 MG tablet, Take 1 tablet (20 mg total) by mouth daily, Disp: 90 tablet, Rfl: 3    traZODone (DESYREL) 100 mg tablet, Take 1 tablet (100 mg total) by mouth daily at bedtime, Disp: 30 tablet, Rfl: 3    zolpidem (AMBIEN) 10 mg tablet, Take 1 tablet (10 mg total) by mouth daily at bedtime as needed for sleep, Disp: 30 tablet, Rfl: 3    predniSONE 20 mg tablet, 2 PO QD X 4 DAYS, THEN 1 PO QD X 4 DAYS, THEN 1/2 PO QD X 4 DAYS (Patient not taking: Reported on 7/25/2022), Disp: 14 tablet, Rfl: 0    Allergies   Allergen Reactions    Pollen Extract Nasal Congestion       Physical Exam:    /75   Pulse 78   Temp 98 2 °F (36 8 °C)   Resp 19   Ht 6' 2" (1 88 m)   Wt 89 8 kg (198 lb)   BMI 25 42 kg/m²     Constitutional: normal, well developed, well nourished, alert, in no distress and non-toxic and no overt pain behavior  Eyes: anicteric  HEENT: grossly intact  Neck: supple, symmetric, trachea midline and no masses   Pulmonary:even and unlabored  Cardiovascular:No edema or pitting edema present  Skin:Normal without rashes or lesions and well hydrated  Psychiatric:Mood and affect appropriate  Neurologic:Cranial Nerves II-XII grossly intact  Musculoskeletal:normal     Lumbar Spine Exam    Appearance:  Normal lordosis  Palpation/Tenderness:  no tenderness or spasm  Sensory:  no sensory deficits noted  Range of Motion:  Flexion:   Moderately limited  with pain  Extension:  Moderately limited  with pain  Lateral Flexion - Left:  Moderately limited  with pain  Lateral Flexion - Right:  Moderately limited  with pain  Rotation - Left:  Moderately limited  with pain  Rotation - Right:  Moderately limited  with pain   Lumbar facet loading is negative bilaterally  Motor Strength:  Left hip flexion:  5/5  Left hip extension:  5/5  Right hip flexion:  5/5  Right hip extension:  5/5  Left knee flexion:  5/5  Left knee extension:  5/5  Right knee flexion:  5/5  Right knee extension:  5/5  Left foot dorsiflexion:  5/5  Left foot plantar flexion:  5/5  Right foot dorsiflexion:  5/5  Right foot plantar flexion:  5/5  Reflexes:  Left Patellar:  2+   Right Patellar:  2+   Left Achilles:  2+   Right Achilles:  2+   Special Tests:  Left Straight Leg Test:  negative  Right Straight Leg Test:  negative  Left Abhinav's Maneuver:  negative  Right Abhinav's Maneuver:  negative    Imaging      MRI lumbar spine wo contrast    Status: Final result               PACS Images     Show images for MRI lumbar spine wo contrast      MRI lumbar spine wo contrast: Result Notes     Ashleigh Case   7/5/2022  9:21 AM EDT         I spoke to the pt, he states he is feeling ok  He does have an appt this afternoon  No further action needed  Sue Ramirez MD   7/2/2022 12:58 PM EDT         PLEASE CALL JOHANNE     MRI SHOWS A LOT OF ARTHRITIS AND DEGENERATIVE CHANGES  SOME SPINAL STENOSIS NOTED     HOW IS HE FEELING?              Study Result    Narrative & Impression   MRI LUMBAR SPINE WITHOUT CONTRAST     INDICATION: M54 42: Lumbago with sciatica, left side  G89 29: Other chronic pain      COMPARISON:  None      TECHNIQUE:  Sagittal T1, sagittal T2, sagittal inversion recovery, axial T1 and axial T2, coronal T2     IMAGE QUALITY:  Diagnostic     FINDINGS:     VERTEBRAL BODIES:  There are 5 lumbar type vertebral bodies  Normal alignment  No spondylolisthesis or spondylolysis  Minimal dextroscoliosis of the lower lumbar spine  There is a mild compression deformity of the L1 superior endplate, chronic with   no associated marrow edema      There is a deformity of the inferior endplate of the L2 vertebral body which has the appearance of Schmorl's nodes, demonstrating mild edema      SACRUM:  Normal signal within the sacrum   No evidence of insufficiency or stress fracture      DISTAL CORD AND CONUS:  Normal size and signal within the distal cord and conus      PARASPINAL SOFT TISSUES:  Paraspinal soft tissues are unremarkable      LOWER THORACIC DISC SPACES:  Normal disc height and signal   No disc herniation, canal stenosis or foraminal narrowing      LUMBAR DISC SPACES:     L1-L2:  Normal      L2-L3:  There is mild annular bulging and mild bilateral facet arthropathy  Small right-sided facet effusion  There is a synovial cyst extending medially from the right facet joint into the central canal with mild mass effect upon the thecal sac, see   series 6 image 16  This measures approximately 6 mm in maximum dimension  Mild canal stenosis and mild bilateral foraminal narrowing, right greater than left      L3-L4:  Normal disc height and signal   Mild bilateral facet arthropathy  There is no canal stenosis or foraminal narrowing      L4-L5:  Disc desiccation  Mild annular bulging with moderate facet degenerative change and ligamentum flavum thickening  Moderate tricompartmental canal stenosis with narrowing of the anterolateral recesses of the thecal sac  Mild foraminal narrowing  Correlate for bilateral L5 radiculopathy      L5-S1:  Normal disc height and signal   No disc herniation  Mild facet arthropathy  No canal stenosis  Minor no foraminal narrowing      IMPRESSION:     Lumbar degenerative disc disease most pronounced at the L2-3 and L4-5 levels  Canal stenosis is most pronounced at L4-5 due to disc and posterior element hypertrophic change with narrowing of the anterior lateral recesses  Correlate for L5   radiculopathy      Small synovial cyst at the L2-3 level         Workstation performed: [id number]        Imaging    MRI lumbar spine wo contrast (Order: [id number]) - 6/29/2022    Result History    MRI lumbar spine wo contrast (Order #[id number]) on 6/30/2022 - Order Result History Report    Order Report     Order Details      Order Questions    Question Answer   What is the patient's sedation requirement? No Sedation   Metallic implants? No   Note: Answer Yes or No   Does this procedure require the 3T MRI at [facility]? No   Release to patient through Mychart Immediate   Is order priority selected as STAT?  No   Exam reason CHRONIC LOW BACK PAIN WITH L SIDED SCIATICA S/P X RAY AND PT WITHOUT IMPROVEMENT   Note: Enter reason for exam              Result Information    Status Priority Source   Final result (6/30/2022  1:54 PM) Routine        MRI lumbar spine wo contrast: Result Notes     Sameera Miramontes   7/5/2022  9:21 AM EDT         I spoke to the pt, he states he is feeling ok  He does have an appt this afternoon  No further action needed  Mer Siegel MD   7/2/2022 12:58 PM EDT         PLEASE CALL JOHANNE     MRI SHOWS A LOT OF ARTHRITIS AND DEGENERATIVE CHANGES  SOME SPINAL STENOSIS NOTED     HOW IS HE FEELING?            Reason for Exam    CHRONIC LOW BACK PAIN WITH L SIDED SCIATICA S/P X RAY AND PT WITHOUT IMPROVEMENT; Low back pain, symptoms persist with > 6wks conservative treatment; CHRONIC LOW BACK PAIN WITH L SIDED SCIATICA S/P X RAY AND PT WITHOUT IMPROVEMENT   Dx: Chronic midline low back pain with left-sided sciatica [M54 42, G89 29 (ICD-10-CM)]     All Reviewers List    Mer Siegel MD on 7/5/2022 11:37 AM   Sameera Miramontes on 7/5/2022  9:21 AM   Mer Siegel MD on 7/2/2022 12:58 PM         MRI lumbar spine wo contrast: Patient Communication     Add Comments   Seen         Signed by    Signed Date/Time  Phone Pager   Isai Sewell 6/30/2022 13:54 [phone]        Exam Information    Status Exam Begun  Exam Ended  Performing Tech   Final [99] 6/29/2022 14:58 6/29/2022 15:28 Francesca Neighbours       Screening Form Questions     important suggestion  Questionnaires are displayed in the order they were answered  Answer Comment   What are your symptoms? Lower back and hip pain    Do you have a cardiac pacemaker or internal defibrillator? NA    Please list /make/model:     Have you had any HEART surgery? None    Please list make/model:     Heart surgery: If "other", please describe:     Have you had any BRAIN surgery? None    Please list  or describe implant:     Brain surgery: If "other", please describe:     Have you had any EYE surgery?  Cataracts    Eye surgery: If "other", please describe: Have you ever injured your eyes with metal or metal fragments (grinding,metallic slivers)? No    Eye injury: Please describe:     Have you had any EAR surgery? None    Ear surgery: If "other", please describe:      Do you have an electronic "stimulation" device? None    Please provide  information:     Have you had any abdominal or pelvic surgery? No    Have you had any of the following abdominal or pelvic surgeries:     Abdominal/pelvic surgery: If "other", please describe:     Do you have any ORTHOPEDIC implants/devices? None    Do you have the following: None    Do you have liver disease? None    Do you have kidney disease? None    Have you had a problem with a previous MRI? No    Does the patient require pre-medication? Do you have a personal history of cancer? None    If "other", please specify:       Are you wearing medication patches?   No    Are you wearing any of the following: None    Did the patient provide this information? Yes    Who provided this information (if not the patient)? Relationship to the patient:     Contact number:     MRI STAFF ONLY- Prior imaging reviewed in PACS (initials): de    MRI STAFF ONLY- Epic chart reviewed (initials): de/  fds screened     MRI STAFF ONLY: The patient was scheduled to receive MRI contrast and has received the Medication Guide  No contrast given        External Results Report    Open External Results Report      Encounter    View Encounter                     Patient Care Timeline    No data selected in time range    Pre-op Summary    Pre-op             Recovery Summary    Recovery                 Routing History    None         No orders to display       No orders of the defined types were placed in this encounter

## 2022-07-26 ENCOUNTER — TELEPHONE (OUTPATIENT)
Dept: PAIN MEDICINE | Facility: CLINIC | Age: 77
End: 2022-07-26

## 2022-07-26 NOTE — TELEPHONE ENCOUNTER
Scheduled patient for 8/11/22  Patient denies RX blood thinners/ NSAIDS  Nothing to eat or drink 1 hour prior to procedure  Needs to arrange transportation  Proper clothing for procedure  No vaccines 2 weeks prior or after procedure  If ill or place on antibiotics, please call to reschedule

## 2022-08-11 ENCOUNTER — APPOINTMENT (OUTPATIENT)
Dept: RADIOLOGY | Facility: HOSPITAL | Age: 77
End: 2022-08-11
Payer: COMMERCIAL

## 2022-08-11 ENCOUNTER — HOSPITAL ENCOUNTER (OUTPATIENT)
Facility: AMBULARY SURGERY CENTER | Age: 77
Setting detail: OUTPATIENT SURGERY
Discharge: HOME | End: 2022-08-11
Attending: ANESTHESIOLOGY | Admitting: ANESTHESIOLOGY
Payer: COMMERCIAL

## 2022-08-11 VITALS
SYSTOLIC BLOOD PRESSURE: 165 MMHG | OXYGEN SATURATION: 100 % | TEMPERATURE: 96.7 F | RESPIRATION RATE: 16 BRPM | HEART RATE: 47 BPM | DIASTOLIC BLOOD PRESSURE: 82 MMHG

## 2022-08-11 PROCEDURE — 62323 NJX INTERLAMINAR LMBR/SAC: CPT | Performed by: ANESTHESIOLOGY

## 2022-08-11 RX ORDER — LIDOCAINE HYDROCHLORIDE 10 MG/ML
INJECTION, SOLUTION EPIDURAL; INFILTRATION; INTRACAUDAL; PERINEURAL AS NEEDED
Status: DISCONTINUED | OUTPATIENT
Start: 2022-08-11 | End: 2022-08-11 | Stop reason: HOSPADM

## 2022-08-11 RX ORDER — MAGNESIUM HYDROXIDE 1200 MG/15ML
LIQUID ORAL AS NEEDED
Status: DISCONTINUED | OUTPATIENT
Start: 2022-08-11 | End: 2022-08-11 | Stop reason: HOSPADM

## 2022-08-11 RX ORDER — METHYLPREDNISOLONE ACETATE 80 MG/ML
INJECTION, SUSPENSION INTRA-ARTICULAR; INTRALESIONAL; INTRAMUSCULAR; SOFT TISSUE AS NEEDED
Status: DISCONTINUED | OUTPATIENT
Start: 2022-08-11 | End: 2022-08-11 | Stop reason: HOSPADM

## 2022-08-11 NOTE — OP NOTE
ATTENDING PHYSICIAN:  Gemini Ramirez MD     PROCEDURE:  Lumbar interlaminar midline epidural steroid injection with steroid and local anesthetic under fluoroscopy at the L5-S1 level  PREPROCEDURE DIAGNOSIS:  Low back pain  POSTPROCEDURE DIAGNOSIS:  Low back pain  ANESTHESIA:  Local     ESTIMATED BLOOD LOSS:  Minimal     COMPLICATIONS:  None  LOCATION:  [facility]  CONSENT:  Today's procedure, its potential benefits as well as its risks and potential side effects were reviewed  Discussed risks of the procedure including bleeding, infection, nerve irritation or damage, reactions to the medications, headache, failure of the pain to improve, and potential worsening of the pain were explained to the patient who verbalized understanding and who wished to proceed  Written informed consent was thereby obtained  DESCRIPTION OF THE PROCEDURE:  After written informed consent was obtained, the patient was taken to the fluoroscopy suite and placed in the prone position  Anatomical landmarks were identified by way of fluoroscopy in multiple views  The skin of the lumbar region was prepped and draped in the usual sterile fashion  Strict aseptic technique was utilized  The skin and subcutaneous tissues at the needle entry site were infiltrated with 3 mL of 1% preservative-free lidocaine using a 25-gauge 1-1/2-inch needle  A 20-gauge Tuohy needle was then incrementally advanced under fluoroscopy using a loss of resistance technique  Upon entering into the epidural space, a positive loss of resistance to air was noted and a characteristic "pop" was felt  Proper placement into the epidural space was confirmed with fluoroscopy in multiple views and by continued loss of resistance after injection of 1 mL of sterile preservative-free normal saline as well as the administration of contrast to delineate the epidural space  There were no paresthesias reported   After negative aspiration for CSF or heme, a 6 mL injectate consisting of 1 mL of Depo-Medrol 80 mg/mL mixed with 5 mL of preservative-free normal saline was slowly injected  The patient tolerated the procedure well and all needles were removed with the tips intact  Hemostasis was maintained  There were no apparent paresthesias or complications  The skin was wiped clean and a Band-Aid was placed as appropriate  The patient was monitored for an appropriate period of time following the procedure and remained hemodynamically stable and neurovascularly intact following the procedure  The patient was ultimately discharged to home with supervision in good condition and instructed to call the office in a few days for an update or sooner as warranted  I was present and participated in all key and critical portions of this procedure      James Dougherty MD  8/11/2022  4:05 PM

## 2022-08-11 NOTE — DISCHARGE INSTRUCTIONS
Epidural Steroid Injection   WHAT YOU NEED TO KNOW:   An epidural steroid injection (MARVIN) is a procedure to inject steroid medicine into the epidural space  The epidural space is between your spinal cord and vertebrae  Steroids reduce inflammation and fluid buildup in your spine that may be causing pain  You may be given pain medicine along with the steroids  ACTIVITY  Do not drive or operate machinery today  No strenuous activity today - bending, lifting, etc   You may resume normal activites starting tomorrow - start slowly and as tolerated  You may shower today, but no tub baths or hot tubs  You may have numbness for several hours from the local anesthetic  Please use caution and common sense, especially with weight-bearing activities  CARE OF THE INJECTION SITE  If you have soreness or pain, apply ice to the area today (20 minutes on/20 minutes off)  Starting tomorrow, you may use warm, moist heat or ice if needed  You may have an increase or change in your discomfort for 36-48 hours after your treatment  Apply ice and continue with any pain medication you have been prescribed  Notify the [facility] if you have any of the following: redness, drainage, swelling, headache, stiff neck or fever above 100°F     SPECIAL INSTRUCTIONS  Our office will contact you in approximately 7 days for a progress report  MEDICATIONS  Continue to take all routine medications  Our office may have instructed you to hold some medications  As no general anesthesia was used in today's procedure, you should not experience any side effects related to anesthesia  If you are diabetic, the steroids used in today's injection may temporarily increase your blood sugar levels after the first few days after your injection  Please keep a close eye on your sugars and alert the doctor who manages your diabetes if your sugars are significantly high from your baseline or you are symptomatic       If you have a problem specifically related to your procedure, please call our office at [phone]  Problems not related to your procedure should be directed to your primary care physician

## 2022-08-18 ENCOUNTER — TELEPHONE (OUTPATIENT)
Dept: PAIN MEDICINE | Facility: CLINIC | Age: 77
End: 2022-08-18

## 2022-08-25 LAB
ALBUMIN SERPL-MCNC: 4.5 G/DL (ref 3.6–5.1)
ALBUMIN/GLOB SERPL: 2.1 (CALC) (ref 1–2.5)
ALP SERPL-CCNC: 59 U/L (ref 35–144)
ALT SERPL-CCNC: 18 U/L (ref 9–46)
AST SERPL-CCNC: 21 U/L (ref 10–35)
BILIRUB SERPL-MCNC: 0.7 MG/DL (ref 0.2–1.2)
BUN SERPL-MCNC: 9 MG/DL (ref 7–25)
BUN/CREAT SERPL: ABNORMAL (CALC) (ref 6–22)
CALCIUM SERPL-MCNC: 9.9 MG/DL (ref 8.6–10.3)
CHLORIDE SERPL-SCNC: 96 MMOL/L (ref 98–110)
CHOLEST SERPL-MCNC: 113 MG/DL
CHOLEST/HDLC SERPL: 1.9 (CALC)
CO2 SERPL-SCNC: 33 MMOL/L (ref 20–32)
CREAT SERPL-MCNC: 0.96 MG/DL (ref 0.7–1.28)
GFR/BSA.PRED SERPLBLD CYS-BASED-ARV: 82 ML/MIN/1.73M2
GLOBULIN SER CALC-MCNC: 2.1 G/DL (CALC) (ref 1.9–3.7)
GLUCOSE SERPL-MCNC: 89 MG/DL (ref 65–99)
HDLC SERPL-MCNC: 58 MG/DL
LDLC SERPL CALC-MCNC: 39 MG/DL (CALC)
NONHDLC SERPL-MCNC: 55 MG/DL (CALC)
POTASSIUM SERPL-SCNC: 5.1 MMOL/L (ref 3.5–5.3)
PROT SERPL-MCNC: 6.6 G/DL (ref 6.1–8.1)
SODIUM SERPL-SCNC: 137 MMOL/L (ref 135–146)
TRIGL SERPL-MCNC: 81 MG/DL

## 2022-09-06 ENCOUNTER — OFFICE VISIT (OUTPATIENT)
Dept: FAMILY MEDICINE CLINIC | Facility: CLINIC | Age: 77
End: 2022-09-06
Payer: COMMERCIAL

## 2022-09-06 VITALS
BODY MASS INDEX: 24.64 KG/M2 | TEMPERATURE: 97 F | OXYGEN SATURATION: 100 % | HEART RATE: 66 BPM | RESPIRATION RATE: 12 BRPM | DIASTOLIC BLOOD PRESSURE: 70 MMHG | HEIGHT: 74 IN | WEIGHT: 192 LBS | SYSTOLIC BLOOD PRESSURE: 130 MMHG

## 2022-09-06 DIAGNOSIS — Z13.29 SCREENING FOR HYPOTHYROIDISM: ICD-10-CM

## 2022-09-06 DIAGNOSIS — E78.2 MIXED HYPERLIPIDEMIA: ICD-10-CM

## 2022-09-06 DIAGNOSIS — G47.9 DISTURBANCE OF SLEEP: ICD-10-CM

## 2022-09-06 DIAGNOSIS — Z00.00 MEDICARE ANNUAL WELLNESS VISIT, SUBSEQUENT: ICD-10-CM

## 2022-09-06 DIAGNOSIS — J45.20 MILD INTERMITTENT ASTHMA WITHOUT COMPLICATION: ICD-10-CM

## 2022-09-06 DIAGNOSIS — Z12.5 ENCOUNTER FOR PROSTATE CANCER SCREENING: ICD-10-CM

## 2022-09-06 DIAGNOSIS — M15.9 GENERALIZED OSTEOARTHRITIS OF MULTIPLE SITES: ICD-10-CM

## 2022-09-06 DIAGNOSIS — I10 PRIMARY HYPERTENSION: Primary | ICD-10-CM

## 2022-09-06 DIAGNOSIS — Z12.11 COLON CANCER SCREENING: ICD-10-CM

## 2022-09-06 DIAGNOSIS — M48.062 SPINAL STENOSIS OF LUMBAR REGION WITH NEUROGENIC CLAUDICATION: ICD-10-CM

## 2022-09-06 PROCEDURE — 3288F FALL RISK ASSESSMENT DOCD: CPT | Performed by: FAMILY MEDICINE

## 2022-09-06 PROCEDURE — 3078F DIAST BP <80 MM HG: CPT | Performed by: FAMILY MEDICINE

## 2022-09-06 PROCEDURE — 99215 OFFICE O/P EST HI 40 MIN: CPT | Performed by: FAMILY MEDICINE

## 2022-09-06 PROCEDURE — 3725F SCREEN DEPRESSION PERFORMED: CPT | Performed by: FAMILY MEDICINE

## 2022-09-06 PROCEDURE — 3075F SYST BP GE 130 - 139MM HG: CPT | Performed by: FAMILY MEDICINE

## 2022-09-06 PROCEDURE — 1170F FXNL STATUS ASSESSED: CPT | Performed by: FAMILY MEDICINE

## 2022-09-06 PROCEDURE — 1125F AMNT PAIN NOTED PAIN PRSNT: CPT | Performed by: FAMILY MEDICINE

## 2022-09-06 PROCEDURE — 1160F RVW MEDS BY RX/DR IN RCRD: CPT | Performed by: FAMILY MEDICINE

## 2022-09-06 PROCEDURE — G0439 PPPS, SUBSEQ VISIT: HCPCS | Performed by: FAMILY MEDICINE

## 2022-09-06 RX ORDER — PHENOL 1.4 %
AEROSOL, SPRAY (ML) MUCOUS MEMBRANE
COMMUNITY

## 2022-09-06 RX ORDER — ESZOPICLONE 3 MG/1
3 TABLET, FILM COATED ORAL
Qty: 30 TABLET | Refills: 1 | Status: SHIPPED | OUTPATIENT
Start: 2022-09-06 | End: 2022-10-28

## 2022-09-06 NOTE — PATIENT INSTRUCTIONS
DISCUSSED HEALTH MAINTENENCE ISSUES  BW WILL BE OBTAINED  ENCOURAGED HEALTHY DIET AND EXERCISE  COLONOSCOPY WILL BE ORDERED  RV FOR ANNUAL HEALTH EXAM IN 1 YEAR  RV SOONER IF THERE ARE ANY CONCERNS      RV 6M    Recent Results (from the past 672 hour(s))   Lipid panel    Collection Time: 08/25/22  1:29 PM   Result Value Ref Range    Total Cholesterol 113 <200 mg/dL    HDL 58 > OR = 40 mg/dL    Triglycerides 81 <150 mg/dL    LDL Calculated 39 mg/dL (calc)    Chol HDLC Ratio 1 9 <5 0 (calc)    Non-HDL Cholesterol 55 <130 mg/dL (calc)   Comprehensive metabolic panel    Collection Time: 08/25/22  1:29 PM   Result Value Ref Range    Glucose, Random 89 65 - 99 mg/dL    BUN 9 7 - 25 mg/dL    Creatinine 0 96 0 70 - 1 28 mg/dL    eGFR 82 > OR = 60 mL/min/1 73m2    SL AMB BUN/CREATININE RATIO NOT APPLICABLE 6 - 22 (calc)    Sodium 137 135 - 146 mmol/L    Potassium 5 1 3 5 - 5 3 mmol/L    Chloride 96 (L) 98 - 110 mmol/L    CO2 33 (H) 20 - 32 mmol/L    Calcium 9 9 8 6 - 10 3 mg/dL    Protein, Total 6 6 6 1 - 8 1 g/dL    Albumin 4 5 3 6 - 5 1 g/dL    Globulin 2 1 1 9 - 3 7 g/dL (calc)    Albumin/Globulin Ratio 2 1 1 0 - 2 5 (calc)    TOTAL BILIRUBIN 0 7 0 2 - 1 2 mg/dL    Alkaline Phosphatase 59 35 - 144 U/L    AST 21 10 - 35 U/L    ALT 18 9 - 46 U/L       Medicare Preventive Visit Patient Instructions  Thank you for completing your Welcome to Medicare Visit or Medicare Annual Wellness Visit today  Your next wellness visit will be due in one year (9/7/2023)  The screening/preventive services that you may require over the next 5-10 years are detailed below  Some tests may not apply to you based off risk factors and/or age  Screening tests ordered at today's visit but not completed yet may show as past due  Also, please note that scanned in results may not display below    Preventive Screenings:  Service Recommendations Previous Testing/Comments   Colorectal Cancer Screening  · Colonoscopy    · Fecal Occult Blood Test (FOBT)/Fecal Immunochemical Test (FIT)  · Fecal DNA/Cologuard Test  · Flexible Sigmoidoscopy Age: 39-70 years old   Colonoscopy: every 10 years (May be performed more frequently if at higher risk)  OR  FOBT/FIT: every 1 year  OR  Cologuard: every 3 years  OR  Sigmoidoscopy: every 5 years  Screening may be recommended earlier than age 39 if at higher risk for colorectal cancer  Also, an individualized decision between you and your healthcare provider will decide whether screening between the ages of 74-80 would be appropriate  Colonoscopy: 01/02/2020  FOBT/FIT: Not on file  Cologuard: Not on file  Sigmoidoscopy: Not on file    Screening Current     Prostate Cancer Screening Individualized decision between patient and health care provider in men between ages of 53-78   Medicare will cover every 12 months beginning on the day after your 50th birthday PSA: 0 7 ng/mL     Screening Not Indicated     Hepatitis C Screening Once for adults born between 1945 and 1965  More frequently in patients at high risk for Hepatitis C Hep C Antibody: 08/16/2021    Screening Current   Diabetes Screening 1-2 times per year if you're at risk for diabetes or have pre-diabetes Fasting glucose: No results in last 5 years (No results in last 5 years)  A1C: No results in last 5 years (No results in last 5 years)  Screening Current   Cholesterol Screening Once every 5 years if you don't have a lipid disorder  May order more often based on risk factors  Lipid panel: 08/25/2022  Screening Not Indicated  History Lipid Disorder      Other Preventive Screenings Covered by Medicare:  1  Abdominal Aortic Aneurysm (AAA) Screening: covered once if your at risk  You're considered to be at risk if you have a family history of AAA or a male between the age of 73-68 who smoking at least 100 cigarettes in your lifetime    2  Lung Cancer Screening: covers low dose CT scan once per year if you meet all of the following conditions: (1) Age 50-69; (2) No signs or symptoms of lung cancer; (3) Current smoker or have quit smoking within the last 15 years; (4) You have a tobacco smoking history of at least 20 pack years (packs per day x number of years you smoked); (5) You get a written order from a healthcare provider  3  Glaucoma Screening: covered annually if you're considered high risk: (1) You have diabetes OR (2) Family history of glaucoma OR (3)  aged 48 and older OR (3)  American aged 72 and older  3  Osteoporosis Screening: covered every 2 years if you meet one of the following conditions: (1) Have a vertebral abnormality; (2) On glucocorticoid therapy for more than 3 months; (3) Have primary hyperparathyroidism; (4) On osteoporosis medications and need to assess response to drug therapy  5  HIV Screening: covered annually if you're between the age of 12-76  Also covered annually if you are younger than 13 and older than 72 with risk factors for HIV infection  For pregnant patients, it is covered up to 3 times per pregnancy  Immunizations:  Immunization Recommendations   Influenza Vaccine Annual influenza vaccination during flu season is recommended for all persons aged >= 6 months who do not have contraindications   Pneumococcal Vaccine   * Pneumococcal conjugate vaccine = PCV13 (Prevnar 13), PCV15 (Vaxneuvance), PCV20 (Prevnar 20)  * Pneumococcal polysaccharide vaccine = PPSV23 (Pneumovax) Adults 25-60 years old: 1-3 doses may be recommended based on certain risk factors  Adults 72 years old: 1-2 doses may be recommended based off what pneumonia vaccine you previously received   Hepatitis B Vaccine 3 dose series if at intermediate or high risk (ex: diabetes, end stage renal disease, liver disease)   Tetanus (Td) Vaccine - COST NOT COVERED BY MEDICARE PART B Following completion of primary series, a booster dose should be given every 10 years to maintain immunity against tetanus  Td may also be given as tetanus wound prophylaxis     Tdap Vaccine - COST NOT COVERED BY MEDICARE PART B Recommended at least once for all adults  For pregnant patients, recommended with each pregnancy  Shingles Vaccine (Shingrix) - COST NOT COVERED BY MEDICARE PART B  2 shot series recommended in those aged 48 and above     Health Maintenance Due:      Topic Date Due    Colorectal Cancer Screening  01/02/2030    Hepatitis C Screening  Completed     Immunizations Due:      Topic Date Due    Pneumococcal Vaccine: 65+ Years (2 - PPSV23 or PCV20) 08/25/2017    COVID-19 Vaccine (4 - Booster for Moderna series) 02/26/2022    Influenza Vaccine (1) 09/01/2022     Advance Directives   What are advance directives? Advance directives are legal documents that state your wishes and plans for medical care  These plans are made ahead of time in case you lose your ability to make decisions for yourself  Advance directives can apply to any medical decision, such as the treatments you want, and if you want to donate organs  What are the types of advance directives? There are many types of advance directives, and each state has rules about how to use them  You may choose a combination of any of the following:  · Living will: This is a written record of the treatment you want  You can also choose which treatments you do not want, which to limit, and which to stop at a certain time  This includes surgery, medicine, IV fluid, and tube feedings  · Durable power of  for healthcare [facility]): This is a written record that states who you want to make healthcare choices for you when you are unable to make them for yourself  This person, called a proxy, is usually a family member or a friend  You may choose more than 1 proxy  · Do not resuscitate (DNR) order:  A DNR order is used in case your heart stops beating or you stop breathing  It is a request not to have certain forms of treatment, such as CPR  A DNR order may be included in other types of advance directives    · Medical directive: This covers the care that you want if you are in a coma, near death, or unable to make decisions for yourself  You can list the treatments you want for each condition  Treatment may include pain medicine, surgery, blood transfusions, dialysis, IV or tube feedings, and a ventilator (breathing machine)  · Values history: This document has questions about your views, beliefs, and how you feel and think about life  This information can help others choose the care that you would choose  Why are advance directives important? An advance directive helps you control your care  Although spoken wishes may be used, it is better to have your wishes written down  Spoken wishes can be misunderstood, or not followed  Treatments may be given even if you do not want them  An advance directive may make it easier for your family to make difficult choices about your care  © Copyright [employer] 2018 Information is for End User's use only and may not be sold, redistributed or otherwise used for commercial purposes   All illustrations and images included in CareNotes® are the copyrighted property of [employer]  or [address] [place] [address]

## 2022-09-06 NOTE — LETTER
JOHANNE HAMLIN      Current Outpatient Medications:     Asmanex, 30 Metered Doses, 110 MCG/INH AEPB inhaler, INHALE 1 PUFF TWICE DAILY   RINSE MOUTH AFTER USE, Disp: 3 each, Rfl: 3    finasteride (PROSCAR) 5 mg tablet, Take 5 mg by mouth daily, Disp: , Rfl:     glucosamine-chondroitin 500-400 MG tablet, Take 1 tablet by mouth 3 (three) times a day, Disp: , Rfl:     loratadine (CLARITIN) 10 mg tablet, Take 10 mg by mouth daily, Disp: , Rfl:     Mirabegron ER 50 MG TB24, Take 1 tablet (50 mg total) by mouth in the morning, Disp: 90 tablet, Rfl: 3    multivitamin (THERAGRAN) TABS, Take 1 tablet by mouth daily, Disp: , Rfl:     nabumetone (RELAFEN) 750 mg tablet, TAKE 1 TABLET(750 MG) BY MOUTH TWICE DAILY, Disp: 180 tablet, Rfl: 1    Omega-3 Fatty Acids (FISH OIL PO), Take by mouth in the morning, Disp: , Rfl:     predniSONE 20 mg tablet, 2 PO QD X 4 DAYS, THEN 1 PO QD X 4 DAYS, THEN 1/2 PO QD X 4 DAYS (Patient not taking: Reported on 7/25/2022), Disp: 14 tablet, Rfl: 0    rosuvastatin (CRESTOR) 20 MG tablet, Take 1 tablet (20 mg total) by mouth daily, Disp: 90 tablet, Rfl: 3    traZODone (DESYREL) 100 mg tablet, Take 1 tablet (100 mg total) by mouth daily at bedtime, Disp: 30 tablet, Rfl: 3    zolpidem (AMBIEN) 10 mg tablet, Take 1 tablet (10 mg total) by mouth daily at bedtime as needed for sleep, Disp: 30 tablet, Rfl: 3      Recent Results (from the past 672 hour(s))   Lipid panel    Collection Time: 08/25/22  1:29 PM   Result Value Ref Range    Total Cholesterol 113 <200 mg/dL    HDL 58 > OR = 40 mg/dL    Triglycerides 81 <150 mg/dL    LDL Calculated 39 mg/dL (calc)    Chol HDLC Ratio 1 9 <5 0 (calc)    Non-HDL Cholesterol 55 <130 mg/dL (calc)   Comprehensive metabolic panel    Collection Time: 08/25/22  1:29 PM   Result Value Ref Range    Glucose, Random 89 65 - 99 mg/dL    BUN 9 7 - 25 mg/dL    Creatinine 0 96 0 70 - 1 28 mg/dL    eGFR 82 > OR = 60 mL/min/1 73m2    SL AMB BUN/CREATININE RATIO NOT APPLICABLE 6 - 22 (calc)    Sodium 137 135 - 146 mmol/L    Potassium 5 1 3 5 - 5 3 mmol/L    Chloride 96 (L) 98 - 110 mmol/L    CO2 33 (H) 20 - 32 mmol/L    Calcium 9 9 8 6 - 10 3 mg/dL    Protein, Total 6 6 6 1 - 8 1 g/dL    Albumin 4 5 3 6 - 5 1 g/dL    Globulin 2 1 1 9 - 3 7 g/dL (calc)    Albumin/Globulin Ratio 2 1 1 0 - 2 5 (calc)    TOTAL BILIRUBIN 0 7 0 2 - 1 2 mg/dL    Alkaline Phosphatase 59 35 - 144 U/L    AST 21 10 - 35 U/L    ALT 18 9 - 46 U/L

## 2022-09-06 NOTE — PROGRESS NOTES
Assessment and Plan:     Problem List Items Addressed This Visit        Respiratory    Mild intermittent asthma without complication     STABLE            Cardiovascular and Mediastinum    Hypertension - Primary     STABLE  DENIES ANY CP, SOB, PALPITATIONS, OR HEADACHE  NOTES NO WATER RETENTION  COMPLIANT WITH MEDICATION  NO CONCERNS    - CONTINUE CURRENT TREATMENT PLAN  - MONITOR DIETARY SODIUM INTAKE  - ENCOURAGE PHYSICAL ACTIVITY  - RV 6 MONTHS              Musculoskeletal and Integument    Generalized osteoarthritis of multiple sites     STABLE  DENIES ANY JOINT SWELLING OR REDNESS  JOINT STIFFNESS PRESENT  PAIN MANAGEMENT ADEQUATE    - CONTINUE CURRENT MANAGEMENT  - MEDICATION AS DIRECTED  - CALL / RETURN IF SYMPTOMS WORSEN              Other    Disturbance of sleep    Relevant Medications    eszopiclone (LUNESTA) 3 MG tablet    Hyperlipidemia     WATCHING CHOLESTEROL INTAKE  COMPLIANT WITH MEDICATION  NO CONCERNS    - CONTINUE TO MONITOR DIETARY CHOL INTAKE  - CONTINUE CURRENT MEDICATION  - ENCOURAGED PHYSICAL ACTIVITY             Encounter for prostate cancer screening    Colon cancer screening    Screening for hypothyroidism    Medicare annual wellness visit, subsequent    Spinal stenosis of lumbar region with neurogenic claudication     STABLE                 Depression Screening and Follow-up Plan: Patient was screened for depression during today's encounter  They screened negative with a PHQ-2 score of 0  Preventive health issues were discussed with patient, and age appropriate screening tests were ordered as noted in patient's After Visit Summary  Personalized health advice and appropriate referrals for health education or preventive services given if needed, as noted in patient's After Visit Summary       History of Present Illness:     Patient presents for a Medicare Wellness Visit    PATIENT [address] AND ANNUAL EVALUATION OF Anamaria Perry ISSUES WITH THEIR CURRENT STATUS, ASSESSMENT AND PLANS ARE LISTED ABOVE         Patient Care Team:  Nargis Regalado MD as PCP - General (Family Medicine)  Mckay Reynoso MD (Urology)     Review of Systems:     Review of Systems   Constitutional: Negative for chills, fatigue and fever  HENT: Negative for congestion, ear discharge, ear pain, mouth sores, postnasal drip, sore throat and trouble swallowing  Eyes: Negative for pain, discharge and visual disturbance  Respiratory: Negative for cough, shortness of breath and wheezing  Cardiovascular: Negative for chest pain, palpitations and leg swelling  Gastrointestinal: Negative for abdominal distention, abdominal pain, blood in stool, diarrhea and nausea  Endocrine: Negative for polydipsia, polyphagia and polyuria  Genitourinary: Negative for dysuria, frequency, hematuria and urgency  Musculoskeletal: Negative for arthralgias, gait problem and joint swelling  Skin: Negative for pallor and rash  Neurological: Negative for dizziness, syncope, speech difficulty, weakness, light-headedness, numbness and headaches  Hematological: Negative for adenopathy  Psychiatric/Behavioral: Negative for behavioral problems, confusion and sleep disturbance  The patient is not nervous/anxious           Problem List:     Patient Active Problem List   Diagnosis    Disturbance of sleep    Generalized osteoarthritis of multiple sites    Hyperlipidemia    Hypertension    Mild intermittent asthma without complication    Sensorineural hearing loss    Encounter for prostate cancer screening    Colon cancer screening    Screening for hypothyroidism    Medicare annual wellness visit, subsequent    Need for influenza vaccination    Chronic midline low back pain    Chronic midline low back pain with left-sided sciatica    Chronic pain syndrome    Lumbar radiculopathy    Spinal stenosis of lumbar region with neurogenic claudication    Lumbar spondylosis Past Medical and Surgical History:     Past Medical History:   Diagnosis Date    Cataract     resolved: 2016    Glaucoma     Sciatica, right side     last assessed: 2016     Past Surgical History:   Procedure Laterality Date    LUMBAR EPIDURAL INJECTION N/A 2022    Procedure: L5 S1 LUMBAR epidural steroid injection (79101); Surgeon: Harini Handy MD;  Location: [facility] MAIN OR;  Service: Pain Management     TONSILLECTOMY        Family History:     Family History   Problem Relation Age of Onset    Coronary artery disease Mother     Cancer Mother     Coronary artery disease Father     Substance Abuse Daughter     Mental illness Neg Hx       Social History:     Social History     Socioeconomic History    Marital status: /Civil Union     Spouse name: None    Number of children: None    Years of education: None    Highest education level: None   Occupational History    None   Tobacco Use    Smoking status: Former Smoker     Quit date:      Years since quittin 7    Smokeless tobacco: Never Used   Vaping Use    Vaping Use: Never used   Substance and Sexual Activity    Alcohol use: No    Drug use: No    Sexual activity: None   Other Topics Concern    None   Social History Narrative    Denied: history of active advance directive    Daily caffeine consumption, 1 serving a day    Dental care, regularly    No advance directives    Primary spoken language English (as per allscripts)     Social Determinants of Health     Financial Resource Strain: Low Risk     Difficulty of Paying Living Expenses: Not hard at all   Food Insecurity: Not on file   Transportation Needs: No Transportation Needs    Lack of Transportation (Medical): No    Lack of Transportation (Non-Medical):  No   Physical Activity: Not on file   Stress: Not on file   Social Connections: Not on file   Intimate Partner Violence: Not on file   Housing Stability: Not on file      Medications and Allergies: Current Outpatient Medications   Medication Sig Dispense Refill    Asmanex, 30 Metered Doses, 110 MCG/INH AEPB inhaler INHALE 1 PUFF TWICE DAILY  RINSE MOUTH AFTER USE 3 each 3    eszopiclone (LUNESTA) 3 MG tablet Take 1 tablet (3 mg total) by mouth daily at bedtime as needed for sleep Take immediately before bedtime 30 tablet 1    finasteride (PROSCAR) 5 mg tablet Take 5 mg by mouth daily      glucosamine-chondroitin 500-400 MG tablet Take 1 tablet by mouth 3 (three) times a day      Melatonin 10 MG TABS Take by mouth At bedtime      Mirabegron ER 50 MG TB24 Take 1 tablet (50 mg total) by mouth in the morning 90 tablet 3    multivitamin (THERAGRAN) TABS Take 1 tablet by mouth daily      nabumetone (RELAFEN) 750 mg tablet TAKE 1 TABLET(750 MG) BY MOUTH TWICE DAILY 180 tablet 1    Omega-3 Fatty Acids (FISH OIL PO) Take by mouth in the morning      Probiotic Product (PROBIOTIC PO) Take by mouth      rosuvastatin (CRESTOR) 20 MG tablet Take 1 tablet (20 mg total) by mouth daily 90 tablet 3    traZODone (DESYREL) 100 mg tablet Take 1 tablet (100 mg total) by mouth daily at bedtime 30 tablet 3     No current facility-administered medications for this visit       Allergies   Allergen Reactions    Pollen Extract Nasal Congestion      Immunizations:     Immunization History   Administered Date(s) Administered    COVID-19 MODERNA VACC 0 5 ML IM 02/19/2021, 03/18/2021, 10/26/2021    DT (pediatric) 06/01/1999    INFLUENZA 11/01/2019    Influenza Split High Dose Preservative Free IM 10/01/2014, 08/25/2016, 09/01/2017, 10/30/2019    Influenza, high dose seasonal 0 7 mL 09/07/2018, 09/10/2020, 09/01/2021    Influenza, seasonal, injectable 1945, 10/13/2010, 09/12/2013    Pneumococcal Conjugate 13-Valent 08/25/2016    Td (adult), adsorbed 05/20/2010    Tdap 06/07/2022      Health Maintenance:         Topic Date Due    Colorectal Cancer Screening  01/02/2030    Hepatitis C Screening  Completed Topic Date Due    Pneumococcal Vaccine: 65+ Years (2 - PPSV23 or PCV20) 08/25/2017    COVID-19 Vaccine (4 - Booster for Moderna series) 02/26/2022    Influenza Vaccine (1) 09/01/2022      Medicare Screening Tests and Risk Assessments:         Health Risk Assessment:   Patient rates overall health as good  Patient feels that their physical health rating is same  Patient is satisfied with their life  Eyesight was rated as same  Hearing was rated as same  Patient feels that their emotional and mental health rating is same  Patients states they are sometimes angry  Patient states they are sometimes unusually tired/fatigued  Pain experienced in the last 7 days has been some  Patient's pain rating has been 3/10  Patient states that he has experienced no weight loss or gain in last 6 months  Depression Screening:   PHQ-2 Score: 0      Fall Risk Screening: In the past year, patient has experienced: no history of falling in past year      Home Safety:  Patient does not have trouble with stairs inside or outside of their home  Patient has working smoke alarms and has working carbon monoxide detector  Home safety hazards include: none  Nutrition:   Current diet is Regular  Medications:   Patient is currently taking over-the-counter supplements  OTC medications include: see medication list  Patient is able to manage medications  Activities of Daily Living (ADLs)/Instrumental Activities of Daily Living (IADLs):   Walk and transfer into and out of bed and chair?: Yes  Dress and groom yourself?: Yes    Bathe or shower yourself?: Yes    Feed yourself? Yes  Do your laundry/housekeeping?: Yes  Manage your money, pay your bills and track your expenses?: Yes  Make your own meals?: Yes    Do your own shopping?: Yes    Advance Care Planning:   Living will: Yes    Durable POA for healthcare:  Yes    Advanced directive: Yes    Provider agrees with end of life decisions: Yes      Cognitive Screening:   Provider or family/friend/caregiver concerned regarding cognition?: No    PREVENTIVE SCREENINGS      Cardiovascular Screening:    General: Screening Not Indicated and History Lipid Disorder      Diabetes Screening:     General: Screening Current      Colorectal Cancer Screening:     General: Screening Current      Prostate Cancer Screening:    General: Screening Not Indicated      Osteoporosis Screening:    General: Screening Not Indicated      Abdominal Aortic Aneurysm (AAA) Screening:    Risk factors include: age between 73-67 yo and tobacco use        General: Screening Not Indicated      Lung Cancer Screening:     General: Screening Not Indicated      Hepatitis C Screening:    General: Screening Current    Screening, Brief Intervention, and Referral to Treatment (SBIRT)    Screening  Typical number of drinks in a day: 0  Typical number of drinks in a week: 0  Interpretation: Low risk drinking behavior  Single Item Drug Screening:  How often have you used an illegal drug (including marijuana) or a prescription medication for non-medical reasons in the past year? never    Single Item Drug Screen Score: 0  Interpretation: Negative screen for possible drug use disorder    No exam data present     Physical Exam:     /70   Pulse 66   Temp (!) 97 °F (36 1 °C) (Temporal)   Resp 12   Ht 6' 2" (1 88 m)   Wt 87 1 kg (192 lb)   SpO2 100%   BMI 24 65 kg/m²     Physical Exam  Constitutional:       General: He is not in acute distress  Appearance: Normal appearance  He is well-developed and normal weight  He is not diaphoretic  HENT:      Head: Normocephalic and atraumatic  Right Ear: Tympanic membrane and external ear normal       Left Ear: Tympanic membrane and external ear normal       Nose: Nose normal       Mouth/Throat:      Pharynx: No oropharyngeal exudate  Eyes:      General: No scleral icterus  Right eye: No discharge  Left eye: No discharge        Conjunctiva/sclera: Conjunctivae normal       Pupils: Pupils are equal, round, and reactive to light  Neck:      Thyroid: No thyromegaly  Vascular: No JVD  Trachea: No tracheal deviation  Cardiovascular:      Rate and Rhythm: Normal rate and regular rhythm  Heart sounds: Normal heart sounds  No murmur heard  No friction rub  No gallop  Pulmonary:      Effort: Pulmonary effort is normal  No respiratory distress  Breath sounds: Normal breath sounds  No stridor  No wheezing or rales  Chest:      Chest wall: No tenderness  Abdominal:      General: Abdomen is flat  Bowel sounds are normal  There is no distension  Palpations: Abdomen is soft  There is no mass  Tenderness: There is no abdominal tenderness  There is no guarding or rebound  Hernia: No hernia is present  Genitourinary:     Penis: Normal  No tenderness  Testes: Normal    Musculoskeletal:         General: No tenderness or deformity  Normal range of motion  Cervical back: Normal range of motion and neck supple  Comments: MILD DJD CHANGES     Lymphadenopathy:      Cervical: No cervical adenopathy  Skin:     General: Skin is warm and dry  Coloration: Skin is not pale  Findings: No erythema or rash  Neurological:      General: No focal deficit present  Mental Status: He is alert and oriented to person, place, and time  Cranial Nerves: No cranial nerve deficit  Sensory: No sensory deficit  Motor: No weakness or abnormal muscle tone  Coordination: Coordination normal       Gait: Gait normal       Deep Tendon Reflexes: Reflexes normal    Psychiatric:         Mood and Affect: Mood normal          Behavior: Behavior normal          Thought Content:  Thought content normal          Judgment: Judgment normal           Zeke Suh MD

## 2022-09-12 ENCOUNTER — TELEPHONE (OUTPATIENT)
Dept: FAMILY MEDICINE CLINIC | Facility: CLINIC | Age: 77
End: 2022-09-12

## 2022-09-12 DIAGNOSIS — G47.9 DISTURBANCE OF SLEEP: ICD-10-CM

## 2022-09-12 RX ORDER — TRAZODONE HYDROCHLORIDE 100 MG/1
200 TABLET ORAL
Qty: 60 TABLET | Refills: 3 | Status: SHIPPED | OUTPATIENT
Start: 2022-09-12 | End: 2023-01-16 | Stop reason: SDUPTHER

## 2022-09-12 NOTE — TELEPHONE ENCOUNTER
Spoke to [place] to inform him that Dr Rivera Lewis sent in a new rx for him   No further action required

## 2022-09-12 NOTE — TELEPHONE ENCOUNTER
You wanted him to call and give an update of the [facility]  Stated that it did not work  You mentioned to him that you would increase Trazodone  Silvana    Leaving Wednesday for a trip    Can it be done before them

## 2022-09-13 DIAGNOSIS — G47.9 DISTURBANCE OF SLEEP: Primary | ICD-10-CM

## 2022-09-13 RX ORDER — ZOLPIDEM TARTRATE 10 MG/1
10 TABLET ORAL
Qty: 30 TABLET | Refills: 0 | Status: SHIPPED | OUTPATIENT
Start: 2022-09-13 | End: 2022-10-06 | Stop reason: SDUPTHER

## 2022-09-13 RX ORDER — ZOLPIDEM TARTRATE 10 MG/1
10 TABLET ORAL
COMMUNITY
End: 2022-09-13 | Stop reason: SDUPTHER

## 2022-09-13 RX ORDER — ZOLPIDEM TARTRATE 6.25 MG/1
6.25 TABLET, FILM COATED, EXTENDED RELEASE ORAL
COMMUNITY
End: 2022-09-13

## 2022-09-15 ENCOUNTER — TELEPHONE (OUTPATIENT)
Dept: PAIN MEDICINE | Facility: CLINIC | Age: 77
End: 2022-09-15

## 2022-09-15 NOTE — TELEPHONE ENCOUNTER
Scheduled patient for 9/30/22  Patient denies RX blood thinners/ NSAIDS  Nothing to eat or drink 1 hour prior to procedure  Needs to arrange transportation  Proper clothing for procedure  No vaccines 2 weeks prior or after procedure  If ill or place on antibiotics, please call to reschedule

## 2022-09-29 ENCOUNTER — IMMUNIZATIONS (OUTPATIENT)
Dept: FAMILY MEDICINE CLINIC | Facility: CLINIC | Age: 77
End: 2022-09-29
Payer: COMMERCIAL

## 2022-09-29 DIAGNOSIS — Z23 ENCOUNTER FOR IMMUNIZATION: Primary | ICD-10-CM

## 2022-09-29 PROCEDURE — 90662 IIV NO PRSV INCREASED AG IM: CPT

## 2022-09-29 PROCEDURE — G0008 ADMIN INFLUENZA VIRUS VAC: HCPCS

## 2022-09-30 ENCOUNTER — HOSPITAL ENCOUNTER (OUTPATIENT)
Facility: AMBULARY SURGERY CENTER | Age: 77
Setting detail: OUTPATIENT SURGERY
Discharge: HOME | End: 2022-09-30
Attending: ANESTHESIOLOGY | Admitting: ANESTHESIOLOGY
Payer: COMMERCIAL

## 2022-09-30 ENCOUNTER — APPOINTMENT (OUTPATIENT)
Dept: RADIOLOGY | Facility: HOSPITAL | Age: 77
End: 2022-09-30
Payer: COMMERCIAL

## 2022-09-30 VITALS
SYSTOLIC BLOOD PRESSURE: 146 MMHG | RESPIRATION RATE: 18 BRPM | HEART RATE: 56 BPM | OXYGEN SATURATION: 99 % | TEMPERATURE: 97.1 F | DIASTOLIC BLOOD PRESSURE: 81 MMHG

## 2022-09-30 PROCEDURE — 62323 NJX INTERLAMINAR LMBR/SAC: CPT | Performed by: ANESTHESIOLOGY

## 2022-09-30 RX ORDER — METHYLPREDNISOLONE ACETATE 80 MG/ML
INJECTION, SUSPENSION INTRA-ARTICULAR; INTRALESIONAL; INTRAMUSCULAR; SOFT TISSUE AS NEEDED
Status: DISCONTINUED | OUTPATIENT
Start: 2022-09-30 | End: 2022-09-30 | Stop reason: HOSPADM

## 2022-09-30 RX ORDER — LIDOCAINE HYDROCHLORIDE 10 MG/ML
INJECTION, SOLUTION EPIDURAL; INFILTRATION; INTRACAUDAL; PERINEURAL AS NEEDED
Status: DISCONTINUED | OUTPATIENT
Start: 2022-09-30 | End: 2022-09-30 | Stop reason: HOSPADM

## 2022-09-30 NOTE — OP NOTE
ATTENDING PHYSICIAN:  Domingo Leroy MD     PROCEDURE:  Lumbar interlaminar midline epidural steroid injection with steroid and local anesthetic under fluoroscopy at the L5-S1 level  PREPROCEDURE DIAGNOSIS:  Low back pain  POSTPROCEDURE DIAGNOSIS:  Low back pain  ANESTHESIA:  Local     ESTIMATED BLOOD LOSS:  Minimal     COMPLICATIONS:  None  LOCATION:  [address]  CONSENT:  Today's procedure, its potential benefits as well as its risks and potential side effects were reviewed  Discussed risks of the procedure including bleeding, infection, nerve irritation or damage, reactions to the medications, headache, failure of the pain to improve, and potential worsening of the pain were explained to the patient who verbalized understanding and who wished to proceed  Written informed consent was thereby obtained  DESCRIPTION OF THE PROCEDURE:  After written informed consent was obtained, the patient was taken to the fluoroscopy suite and placed in the prone position  Anatomical landmarks were identified by way of fluoroscopy in multiple views  The skin of the lumbar region was prepped and draped in the usual sterile fashion  Strict aseptic technique was utilized  The skin and subcutaneous tissues at the needle entry site were infiltrated with 3 mL of 1% preservative-free lidocaine using a 25-gauge 1-1/2-inch needle  A 20-gauge Tuohy needle was then incrementally advanced under fluoroscopy using a loss of resistance technique  Upon entering into the epidural space, a positive loss of resistance to air was noted and a characteristic "pop" was felt  Proper placement into the epidural space was confirmed with fluoroscopy in multiple views and by continued loss of resistance after injection of 1 mL of sterile preservative-free normal saline as well as the administration of contrast to delineate the epidural space  There were no paresthesias reported   After negative aspiration for CSF or heme, a 6 mL injectate consisting of 1 mL of Depo-Medrol 80 mg/mL mixed with 5 mL of preservative-free normal saline was slowly injected  The patient tolerated the procedure well and all needles were removed with the tips intact  Hemostasis was maintained  There were no apparent paresthesias or complications  The skin was wiped clean and a Band-Aid was placed as appropriate  The patient was monitored for an appropriate period of time following the procedure and remained hemodynamically stable and neurovascularly intact following the procedure  The patient was ultimately discharged to home with supervision in good condition and instructed to call the office in a few days for an update or sooner as warranted  I was present and participated in all key and critical portions of this procedure      Harini Handy  9/30/2022  8:26 AM

## 2022-09-30 NOTE — INTERVAL H&P NOTE
H&P reviewed  After examining the patient I find no changes in the patients condition since the H&P had been written      Vitals:    09/30/22 0759   BP: 143/72   Pulse: 58   Resp: 18   Temp: (!) 97 1 °F (36 2 °C)   SpO2: 97%

## 2022-09-30 NOTE — H&P
History of Present Illness: The patient is a 77 y o  male who presents with complaints of low back pain  Past Medical History:   Diagnosis Date    Cataract     resolved: 7/20/2016    Glaucoma     Sciatica, right side     last assessed: 7/20/2016       Past Surgical History:   Procedure Laterality Date    LUMBAR EPIDURAL INJECTION N/A 8/11/2022    Procedure: L5 S1 LUMBAR epidural steroid injection (06101); Surgeon: James Dougherty MD;  Location: [facility] MAIN OR;  Service: Pain Management     TONSILLECTOMY         No current facility-administered medications for this encounter  Allergies   Allergen Reactions    Pollen Extract Nasal Congestion       Physical Exam: There were no vitals filed for this visit    General: Awake, Alert, Oriented x 3, Mood and affect appropriate  Respiratory: Respirations even and unlabored  Cardiovascular: Peripheral pulses intact; no edema  Musculoskeletal Exam:  Tenderness in lumbar spine region    ASA Score: 2         Assessment:  Low back pain    Plan:  Proceed with L5-S1 lumbar epidural steroid injection

## 2022-09-30 NOTE — DISCHARGE INSTRUCTIONS
Epidural Steroid Injection   WHAT YOU NEED TO KNOW:   An epidural steroid injection (MARVIN) is a procedure to inject steroid medicine into the epidural space  The epidural space is between your spinal cord and vertebrae  Steroids reduce inflammation and fluid buildup in your spine that may be causing pain  You may be given pain medicine along with the steroids  ACTIVITY  Do not drive or operate machinery today  No strenuous activity today - bending, lifting, etc   You may resume normal activites starting tomorrow - start slowly and as tolerated  You may shower today, but no tub baths or hot tubs  You may have numbness for several hours from the local anesthetic  Please use caution and common sense, especially with weight-bearing activities  CARE OF THE INJECTION SITE  If you have soreness or pain, apply ice to the area today (20 minutes on/20 minutes off)  Starting tomorrow, you may use warm, moist heat or ice if needed  You may have an increase or change in your discomfort for 36-48 hours after your treatment  Apply ice and continue with any pain medication you have been prescribed  Notify the [facility] if you have any of the following: redness, drainage, swelling, headache, stiff neck or fever above 100°F     SPECIAL INSTRUCTIONS  Our office will contact you in approximately 7 days for a progress report  MEDICATIONS  Continue to take all routine medications  Our office may have instructed you to hold some medications  As no general anesthesia was used in today's procedure, you should not experience any side effects related to anesthesia  If you are diabetic, the steroids used in today's injection may temporarily increase your blood sugar levels after the first few days after your injection  Please keep a close eye on your sugars and alert the doctor who manages your diabetes if your sugars are significantly high from your baseline or you are symptomatic       If you have a problem specifically related to your procedure, please call our office at [phone]  Problems not related to your procedure should be directed to your primary care physician

## 2022-10-06 DIAGNOSIS — G47.9 DISTURBANCE OF SLEEP: ICD-10-CM

## 2022-10-07 ENCOUNTER — TELEPHONE (OUTPATIENT)
Dept: PAIN MEDICINE | Facility: CLINIC | Age: 77
End: 2022-10-07

## 2022-10-07 NOTE — TELEPHONE ENCOUNTER
Caller: Patient     Doctor: Dr Ernestine Dorsey     Reason for call: Pt called in with 75 % improvement and pain level 3/10 2 wk post inj        Call back#: [phone]

## 2022-10-08 RX ORDER — ZOLPIDEM TARTRATE 10 MG/1
10 TABLET ORAL
Qty: 30 TABLET | Refills: 0 | Status: SHIPPED | OUTPATIENT
Start: 2022-10-08

## 2022-10-25 NOTE — TELEPHONE ENCOUNTER
Caller: Patient    Doctor: Dr Jeremiah Gilford     Reason for call: Schedule a procedure    Call back#: [phone]

## 2022-10-25 NOTE — TELEPHONE ENCOUNTER
S/w pt  Pt had an L5-S1 LESI on 8/11 and 9/30/22  Pt is requesting to repeat this procedure  Per pt he had 100% relief after the 8/11 LESI and 75% relief in September  Per pt his relief lasted 3 5 weeks and his current pain level is a 3/10  Pt was advised that we normally repeat these procedures every 3 months and 3x per year  Per pt his pain is tolerable but just thought he would ask      Please advise-

## 2022-10-26 NOTE — TELEPHONE ENCOUNTER
S/w the patient and reviewed the previous message and he stated he would like to proceed to schedule the next injection c/ AS  Please call and schedule  Thanks!

## 2022-10-26 NOTE — TELEPHONE ENCOUNTER
Scheduled patient for 12/8/22  Patient denies RX blood thinners/ NSAIDS  Nothing to eat or drink 1 hour prior to procedure  Needs to arrange transportation  Proper clothing for procedure  No vaccines 2 weeks prior or after procedure  If ill or place on antibiotics, please call to reschedule

## 2022-10-26 NOTE — TELEPHONE ENCOUNTER
I don't mind doing another one for him, but if he needs a 4th MARVIN, he will have to wait until next year

## 2022-10-28 ENCOUNTER — TELEPHONE (OUTPATIENT)
Dept: FAMILY MEDICINE CLINIC | Facility: CLINIC | Age: 77
End: 2022-10-28

## 2022-10-28 DIAGNOSIS — R21 RASH: Primary | ICD-10-CM

## 2022-10-28 RX ORDER — TRIAMCINOLONE ACETONIDE 1 MG/G
OINTMENT TOPICAL 2 TIMES DAILY
Qty: 453.6 G | Refills: 0 | Status: SHIPPED | OUTPATIENT
Start: 2022-10-28 | End: 2023-03-06 | Stop reason: ALTCHOICE

## 2022-10-28 NOTE — TELEPHONE ENCOUNTER
Valentin Diamond states he got this ointment from his dermatologist for a rash he has all over his body  Valentin Diamond asked if you could refill it for him   triamtinolone acetonide ointment 0 1%  454gm    Apply topically to rash 2x daily to rash for [facility]

## 2022-11-11 DIAGNOSIS — G47.9 DISTURBANCE OF SLEEP: ICD-10-CM

## 2022-11-11 RX ORDER — ZOLPIDEM TARTRATE 10 MG/1
10 TABLET ORAL
Qty: 30 TABLET | Refills: 0 | Status: SHIPPED | OUTPATIENT
Start: 2022-11-11

## 2022-12-05 DIAGNOSIS — G47.9 DISTURBANCE OF SLEEP: ICD-10-CM

## 2022-12-05 RX ORDER — ZOLPIDEM TARTRATE 10 MG/1
10 TABLET ORAL
Qty: 30 TABLET | Refills: 0 | Status: SHIPPED | OUTPATIENT
Start: 2022-12-05

## 2022-12-28 ENCOUNTER — TELEPHONE (OUTPATIENT)
Dept: FAMILY MEDICINE CLINIC | Facility: CLINIC | Age: 77
End: 2022-12-28

## 2022-12-28 ENCOUNTER — TELEMEDICINE (OUTPATIENT)
Dept: FAMILY MEDICINE CLINIC | Facility: CLINIC | Age: 77
End: 2022-12-28

## 2022-12-28 DIAGNOSIS — U07.1 COVID-19: Primary | ICD-10-CM

## 2022-12-28 RX ORDER — NIRMATRELVIR AND RITONAVIR 300-100 MG
3 KIT ORAL 2 TIMES DAILY
Qty: 30 TABLET | Refills: 0 | Status: SHIPPED | OUTPATIENT
Start: 2022-12-28 | End: 2023-01-02

## 2022-12-28 NOTE — PROGRESS NOTES
COVID-19 Outpatient Progress Note    Assessment/Plan:    Problem List Items Addressed This Visit    None  Visit Diagnoses     COVID-19    -  Primary    Relevant Medications    nirmatrelvir & ritonavir (Paxlovid, 300/100,) tablet therapy pack         Disposition:     Patient has asymptomatic or mild COVID-19 infection  Based off CDC guidelines, they were recommended to isolate for 5 days  If they are asymptomatic or symptoms are improving with no fevers in the past 24 hours, isolation may be ended followed by 5 days of wearing a mask when around othes to minimize risk of infecting others  If still have a fever or other symptoms have not improved, continue to isolate until they improve  Regardless of when they end isolation, avoid being around people who are more likely to get very sick from COVID-19 until at least day 11  Patient with moderate or severe illness or has a weakened immune system  They should isolate from others through at least day 10  Isolation can be ended if symptoms are improving and they are fever free for the past 24 hours  If they still have fever or other symptoms have not improved, continue to isolate until they improve  Regardless of when you isolation is ended, avoid being around people who are more likely to get very sick from COVID-19 until at least day 11  I have spent 15 minutes directly with the patient  Greater than 50% of this time was spent in counseling/coordination of care regarding: instructions for management, patient and family education and impressions  Encounter provider: MINDI Saunders     Provider located at: [address] [place] [phone]     Recent Visits  No visits were found meeting these conditions    Showing recent visits within past 7 days and meeting all other requirements  Today's Visits  Date Type Provider Dept   12/28/22 Telemedicine Carl Brady, [address] [place] [address] [facility]   12/28/22 Telephone [address] [facility]   Showing today's visits and meeting all other requirements  Future Appointments  No visits were found meeting these conditions  Showing future appointments within next 150 days and meeting all other requirements     This virtual check-in was done via telephone and he agrees to proceed  Patient agrees to participate in a virtual check in via telephone or video visit instead of presenting to the office to address urgent/immediate medical needs  Patient is aware this is a billable service  He acknowledged consent and understanding of privacy and security of the video platform  The patient has agreed to participate and understands they can discontinue the visit at any time  After connecting through Telephone, the patient was identified by name and date of birth  Geri Puente was informed that this was a telemedicine visit and that the exam was being conducted confidentially over secure lines  Geri Puente acknowledged consent and understanding of privacy and security of the telemedicine visit  I informed the patient that I have reviewed his record in Epic and presented the opportunity for him to ask any questions regarding the visit today  The patient agreed to participate  It was my intent to perform this visit via video technology but the patient was not able to do a video connection so the visit was completed via audio telephone only  Verification of patient location:  Patient is located in the following state in which I hold an active license: [place]    Subjective:   Geri Puente is a 69 y o  male who has been screened for COVID-19  Symptom change since last report: unchanged  Patient's symptoms include fever, chills, fatigue and cough   Patient denies malaise, congestion, rhinorrhea, sore throat, anosmia, loss of taste, shortness of breath, chest tightness, abdominal pain, nausea, vomiting, diarrhea, myalgias and headaches  - Date of symptom onset: 12/27/2022  - Date of positive COVID-19 test: 12/28/2022  Type of test: Home antigen  Home antigen result verified by provider  Will get picture of test uploaded/scanned into patient's chart  COVID-19 vaccination status: Fully vaccinated with booster    Keara Sepulveda has been staying home and has isolated themselves in his home  He is taking care to not share personal items and is cleaning all surfaces that are touched often, like counters, tabletops, and doorknobs using household cleaning sprays or wipes  He is wearing a mask when he leaves his room  No results found for: Dulce Petit, SARSCORONAVI, CORONAVIRUSR, SARSCOVAG, [address]    Review of Systems   Constitutional: Positive for chills, fatigue and fever  HENT: Negative for congestion, rhinorrhea and sore throat  Respiratory: Positive for cough  Negative for chest tightness and shortness of breath  Gastrointestinal: Negative for abdominal pain, diarrhea, nausea and vomiting  Musculoskeletal: Negative for myalgias  Neurological: Negative for headaches  Current Outpatient Medications on File Prior to Visit   Medication Sig   • zolpidem (AMBIEN) 10 mg tablet Take 1 tablet (10 mg total) by mouth daily at bedtime as needed for sleep   • Asmanex, 30 Metered Doses, 110 MCG/INH AEPB inhaler INHALE 1 PUFF TWICE DAILY   RINSE MOUTH AFTER USE   • finasteride (PROSCAR) 5 mg tablet Take 5 mg by mouth daily   • glucosamine-chondroitin 500-400 MG tablet Take 1 tablet by mouth 3 (three) times a day   • Melatonin 10 MG TABS Take by mouth At bedtime   • Mirabegron ER 50 MG TB24 Take 1 tablet (50 mg total) by mouth in the morning   • multivitamin (THERAGRAN) TABS Take 1 tablet by mouth daily   • nabumetone (RELAFEN) 750 mg tablet TAKE 1 TABLET(750 MG) BY MOUTH TWICE DAILY   • Omega-3 Fatty Acids (FISH OIL PO) Take by mouth in the morning   • Probiotic Product (PROBIOTIC PO) Take by mouth   • rosuvastatin (CRESTOR) 20 MG tablet Take 1 tablet (20 mg total) by mouth daily   • traZODone (DESYREL) 100 mg tablet Take 2 tablets (200 mg total) by mouth daily at bedtime   • triamcinolone (KENALOG) 0 1 % ointment Apply topically 2 (two) times a day       Objective: There were no vitals taken for this visit  It was my intent to perform this visit via video technology but the patient was not able to do a video connection so the visit was completed via audio telephone only          Jasmin Turner [address]

## 2022-12-28 NOTE — TELEPHONE ENCOUNTER
Thea Smith just tested positive today for covid  Symptoms started 12/27/22  He is tired, has a cough, no fever, has body aches, congestion    Interested paxlovid  Wife through test away and not sure how to send it in

## 2022-12-30 ENCOUNTER — TELEPHONE (OUTPATIENT)
Dept: PAIN MEDICINE | Facility: CLINIC | Age: 77
End: 2022-12-30

## 2022-12-30 NOTE — TELEPHONE ENCOUNTER
Caller: [facility] Pre -encounter dept    Doctor: Michelle Oliver    Reason for call: a new auth is req due to pt having a new ins starting on 1/1/23  This is for the 52893 code       Call back#: [phone]

## 2023-01-04 ENCOUNTER — APPOINTMENT (OUTPATIENT)
Dept: RADIOLOGY | Facility: HOSPITAL | Age: 78
End: 2023-01-04

## 2023-01-04 ENCOUNTER — HOSPITAL ENCOUNTER (OUTPATIENT)
Facility: AMBULARY SURGERY CENTER | Age: 78
Setting detail: OUTPATIENT SURGERY
Discharge: HOME | End: 2023-01-04
Attending: PHYSICAL MEDICINE & REHABILITATION | Admitting: PHYSICAL MEDICINE & REHABILITATION

## 2023-01-04 VITALS
OXYGEN SATURATION: 98 % | RESPIRATION RATE: 18 BRPM | TEMPERATURE: 97.6 F | SYSTOLIC BLOOD PRESSURE: 166 MMHG | DIASTOLIC BLOOD PRESSURE: 77 MMHG | HEART RATE: 65 BPM

## 2023-01-04 RX ORDER — LIDOCAINE HYDROCHLORIDE 10 MG/ML
INJECTION, SOLUTION EPIDURAL; INFILTRATION; INTRACAUDAL; PERINEURAL AS NEEDED
Status: DISCONTINUED | OUTPATIENT
Start: 2023-01-04 | End: 2023-01-04 | Stop reason: HOSPADM

## 2023-01-04 RX ORDER — METHYLPREDNISOLONE ACETATE 80 MG/ML
INJECTION, SUSPENSION INTRA-ARTICULAR; INTRALESIONAL; INTRAMUSCULAR; SOFT TISSUE AS NEEDED
Status: DISCONTINUED | OUTPATIENT
Start: 2023-01-04 | End: 2023-01-04 | Stop reason: HOSPADM

## 2023-01-04 NOTE — OP NOTE
OPERATIVE REPORT  PATIENT NAME: Dana Rajan    :  1945  MRN: [id number]  Pt Location: [place] [address] MINOR/PAIN ROOM 01    SURGERY DATE: 2023    Surgeon(s) and Role:     * Sanjana Perez, DO - Primary    Preop Diagnosis:  Lumbar stenosis with neurogenic claudication [M48 062]  Lumbar radiculopathy [M54 16]  Low back pain with sciatica, sciatica laterality unspecified, unspecified back pain laterality, unspecified chronicity [M54 40]  Chronic pain syndrome [G89 4]    Post-Op Diagnosis Codes:     * Lumbar stenosis with neurogenic claudication [M48 062]     * Lumbar radiculopathy [M54 16]     * Low back pain with sciatica, sciatica laterality unspecified, unspecified back pain laterality, unspecified chronicity [M54 40]     * Chronic pain syndrome [G89 4]    Procedure(s) (LRB):  L5 S1 LUMBAR epidural steroid injection (60130) (N/A)  Lumbar epidural  Indication:  Back and radiating leg pain  Preoperative diagnosis:  Lumbar radiculitis  Postoperative diagnosis:  Lumbar radiculitis    Procedure: Fluoroscopically-guided L5-S1 interlaminar epidural steroid injection under fluoroscopy    EBL:  none  Specimens:  not applicable    After discussing the risks, benefits, and alternatives to the procedure, the patient expressed understanding and wished to proceed  The patient was brought to the fluoroscopy suite and placed in the prone position  A procedural pause was conducted to verify:  correct patient identity, procedure to be performed and as applicable, correct side and site, correct patient position, and availability of implants, special equipment and special requirements  After identifying the L5-S1 space fluoroscopically, the skin was sterilely prepped and draped in the usual fashion using Chloraprep skin prep  The skin and subcutaneous tissues were anesthetized with 1% lidocaine    Utilizing a loss of resistance technique and intermittent fluoroscopic guidance, a 3 5 inch 20-gauge Tuohy needle was advanced into the epidural space  Proper needle positioning was confirmed using multiple fluoroscopic views  After negative aspiration, Omnipaque 240 contrast was injected confirming epidural spread without evidence of intravascular or intrathecal spread  A 4 ml solution consisting of 80 mg of Depo-Medrol in sterile saline was injected slowly and incrementally into the epidural space  Following the injection the needle was withdrawn slightly and flushed with 1% buffered lidocaine as it was fully extracted  The patient tolerated the procedure well and there were no apparent complications  After appropriate observation, the patient was dismissed from the clinic in good condition under their own power            SIGNATURE: Nina Marr DO  DATE: January 4, 2023  TIME: 11:13 AM

## 2023-01-04 NOTE — H&P
History of Present Illness: The patient is a 69 y o  male who presents with complaints of low back pain    Past Medical History:   Diagnosis Date   • Cataract     resolved: 7/20/2016   • Glaucoma    • Sciatica, right side     last assessed: 7/20/2016       Past Surgical History:   Procedure Laterality Date   • LUMBAR EPIDURAL INJECTION N/A 8/11/2022    Procedure: L5 S1 LUMBAR epidural steroid injection (55196); Surgeon: Kelli Molina MD;  Location: [facility] MAIN OR;  Service: Pain Management    • LUMBAR EPIDURAL INJECTION N/A 9/30/2022    Procedure: L5 S1 LUMBAR epidural steroid injection ( 88318); Surgeon: Kelli Molina MD;  Location: [facility] MAIN OR;  Service: Pain Management    • TONSILLECTOMY         No current facility-administered medications for this encounter  Allergies   Allergen Reactions   • Pollen Extract Nasal Congestion       Physical Exam:   Vitals:    01/04/23 1044   BP: 157/83   Pulse: 67   Resp: 18   Temp: 97 6 °F (36 4 °C)   SpO2: 98%     General: Awake, Alert, Oriented x 3, Mood and affect appropriate  Respiratory: Respirations even and unlabored  Cardiovascular: Peripheral pulses intact; no edema  Musculoskeletal Exam: Tenderness palpation bilateral lumbar paraspinals    ASA Score: 2    Patient/Chart Verification  Patient ID Verified: Verbal, Armband  ID Band Applied: Yes  Consents Confirmed: Procedural  H&P( within 30 days) Verified: Yes  Interval H&P(within 24 hr) Complete (required for Outpatients and Surgery Admit only): Yes  Pre-op Lab/Test Results Available: N/A  Does Patient Have a Prosthetic Device/Implant: Yes    Assessment: No diagnosis found      Plan:

## 2023-01-04 NOTE — DISCHARGE INSTRUCTIONS
Steroid Joint Injection   WHAT YOU NEED TO KNOW:   A steroid joint injection is a procedure to inject steroid medicine into a joint  Steroid medicine decreases pain and inflammation  The injection may also contain an anesthetic (numbing medicine) to decrease pain  It may be done to treat conditions such as arthritis, gout, or carpal tunnel syndrome  The injections may be given in your knee, ankle, shoulder, elbow, wrist, ankle or sacroiliac joint  Do not apply heat to any area that is numb  If you have discomfort or soreness at the injection site, you may apply ice today, 20 minutes on and 20 minutes off  Tomorrow you may use ice or warm, moist heat  Do not apply ice or heat directly to the skin  You may have an increase or change in the discomfort for 36-48 hours after your treatment  Apply ice and continue with any pain medicine you have been prescribed  Do not do anything strenuous today  You may shower, but no tub baths or hot tubs today  You may resume your normal activities tomorrow, but do not “overdo it”  Resume normal activities slowly when you are feeling better  If you experience redness, drainage or swelling at the injection site, or if you develop a fever above 100 degrees, please call [facility] at [phone] or go to the Emergency Room  Continue to take all routine medicines prescribed by your primary care physician unless otherwise instructed by our staff  Most blood thinners should be started again according to your regularly scheduled dosing  If you have any questions, please give our office a call  As no general anesthesia was used in today's procedure, you should not experience any side effects related to anesthesia  If you are diabetic, the steroids used in today's injection may temporarily increase your blood sugar levels after the first few days after your injection   Please keep a close eye on your sugars and alert the doctor who manages your diabetes if your sugars are significantly high from your baseline or you are symptomatic  If you have a problem specifically related to your procedure, please call our office at [phone]  Problems not related to your procedure should be directed to your primary care physician

## 2023-01-10 DIAGNOSIS — G47.9 DISTURBANCE OF SLEEP: ICD-10-CM

## 2023-01-11 ENCOUNTER — TELEPHONE (OUTPATIENT)
Dept: PAIN MEDICINE | Facility: CLINIC | Age: 78
End: 2023-01-11

## 2023-01-11 RX ORDER — ZOLPIDEM TARTRATE 10 MG/1
10 TABLET ORAL
Qty: 30 TABLET | Refills: 0 | Status: SHIPPED | OUTPATIENT
Start: 2023-01-11

## 2023-01-11 NOTE — TELEPHONE ENCOUNTER
Pt reports 80-85% improvement post inj  Pain level 2/10    Patient is aware I will call back next week to get an update.

## 2023-01-15 DIAGNOSIS — J45.20 MILD INTERMITTENT ASTHMA WITHOUT COMPLICATION: ICD-10-CM

## 2023-01-16 DIAGNOSIS — M15.9 GENERALIZED OSTEOARTHRITIS OF MULTIPLE SITES: ICD-10-CM

## 2023-01-16 DIAGNOSIS — G47.9 DISTURBANCE OF SLEEP: ICD-10-CM

## 2023-01-16 RX ORDER — NABUMETONE 750 MG/1
750 TABLET, FILM COATED ORAL 2 TIMES DAILY
Qty: 180 TABLET | Refills: 1 | Status: SHIPPED | OUTPATIENT
Start: 2023-01-16

## 2023-01-16 RX ORDER — MOMETASONE FUROATE 110 UG/1
INHALANT RESPIRATORY (INHALATION)
Qty: 3 EACH | Refills: 3 | Status: SHIPPED | OUTPATIENT
Start: 2023-01-16

## 2023-01-16 RX ORDER — TRAZODONE HYDROCHLORIDE 100 MG/1
200 TABLET ORAL
Qty: 60 TABLET | Refills: 3 | Status: SHIPPED | OUTPATIENT
Start: 2023-01-16

## 2023-01-20 DIAGNOSIS — E78.2 MIXED HYPERLIPIDEMIA: ICD-10-CM

## 2023-01-20 DIAGNOSIS — I10 PRIMARY HYPERTENSION: Primary | ICD-10-CM

## 2023-02-10 DIAGNOSIS — G47.9 DISTURBANCE OF SLEEP: ICD-10-CM

## 2023-02-10 RX ORDER — ZOLPIDEM TARTRATE 10 MG/1
10 TABLET ORAL
Qty: 30 TABLET | Refills: 0 | Status: SHIPPED | OUTPATIENT
Start: 2023-02-10

## 2023-02-13 ENCOUNTER — TELEPHONE (OUTPATIENT)
Dept: PAIN MEDICINE | Facility: MEDICAL CENTER | Age: 78
End: 2023-02-13

## 2023-02-13 NOTE — TELEPHONE ENCOUNTER
Attempted to reach pt. VMMLOM with CB# and OH.  
Caller: Cristhian Ceron    Doctor: Dr Erickson     Reason for call: Patient calling stating he would like to schedule another injection pain level 6-7/10.    Call back#: [phone]  
Caller: Cristhian(PT)    Doctor: Dr Wagner     Reason for call: Schedule a procedure    Call back#: [phone]  
S/w pt. Pt had an LESI on 1/4. Pt scheduled for a f/u ov on 2/28 with a 10:15 arrival time. Pt was last seen for an ov 7/2022.  
none

## 2023-02-28 ENCOUNTER — TELEPHONE (OUTPATIENT)
Dept: PAIN MEDICINE | Facility: CLINIC | Age: 78
End: 2023-02-28

## 2023-02-28 ENCOUNTER — OFFICE VISIT (OUTPATIENT)
Dept: PAIN MEDICINE | Facility: CLINIC | Age: 78
End: 2023-02-28

## 2023-02-28 VITALS
BODY MASS INDEX: 24.19 KG/M2 | WEIGHT: 188.4 LBS | SYSTOLIC BLOOD PRESSURE: 124 MMHG | TEMPERATURE: 98 F | HEART RATE: 64 BPM | DIASTOLIC BLOOD PRESSURE: 72 MMHG

## 2023-02-28 DIAGNOSIS — M54.16 LUMBAR RADICULOPATHY: ICD-10-CM

## 2023-02-28 DIAGNOSIS — G89.4 CHRONIC PAIN SYNDROME: Primary | ICD-10-CM

## 2023-02-28 DIAGNOSIS — M48.062 SPINAL STENOSIS OF LUMBAR REGION WITH NEUROGENIC CLAUDICATION: ICD-10-CM

## 2023-02-28 NOTE — TELEPHONE ENCOUNTER
Scheduled patient for LESI on 5/4/23  Patient denies RX blood thinners/ NSAIDS  Nothing to eat or drink 1 hour prior to procedure  Needs to arrange transportation  Proper clothing for procedure  No vaccines 2 weeks prior or after procedure  If ill or place on antibiotics, please call to reschedule

## 2023-03-01 NOTE — PROGRESS NOTES
Pain Medicine Follow-Up Note    Assessment:  1  Chronic pain syndrome    2  Spinal stenosis of lumbar region with neurogenic claudication    3  Lumbar radiculopathy        Plan:  No orders of the defined types were placed in this encounter  No orders of the defined types were placed in this encounter  My impressions and treatment recommendations were discussed in detail with the patient who verbalized understanding and had no further questions  Patient follows up on a L5-S1 lumbar epidural steroid injection that the patient received on 1/4/2023  Patient reports that he had excellent pain relief however the pain has returned after about 6 weeks  I find it is appropriate for the patient to have a L4-L5 lumbar epidural steroid injection, this is the most stenosed area and his pain symptoms follow an L5 dermatomal pattern  The patient is in agreement with this plan  Complete risks and benefits including bleeding, infection, tissue reaction, nerve injury and allergic reaction were discussed  The approach was demonstrated using models and literature was provided  Verbal and written consent was obtained  Follow-up is planned in 4 weeks after injection time or sooner as warranted  Discharge instructions were provided  I personally saw and examined the patient and I agree with the above discussed plan of care  History of Present Illness:    Tania Larson is a 69 y o  male who presents to [facility] for interval re-evaluation of the above stated pain complaints  The patient has a past medical and chronic pain history as outlined in the assessment section  He was last seen on 1/4/2023  At today's visit patient states that his pain symptoms are overall better with a pain score of 4 out of 10 on the verbal numeric pain scale  On 1/4/2023 the patient had a L5-S1 lumbar epidural steroid injection which provided the patient significant pain relief up until recently    The patient's pain is worse in the morning  The patient's pain is constant in nature  The quality of the patient's pain is described as sharp and shooting  Patient indicates that his pain is located in his bilateral low back and radiate through his bilateral buttocks with the left being worse than the right  Other than as stated above, the patient denies any interval changes in medications, medical condition, mental condition, symptoms, or allergies since the last office visit  Review of Systems:    Review of Systems   Respiratory: Negative for shortness of breath  Cardiovascular: Negative for chest pain  Gastrointestinal: Negative for constipation, diarrhea, nausea and vomiting  Musculoskeletal: Positive for gait problem  Negative for arthralgias, joint swelling and myalgias  Decreased ROM, pain in the low back and bilateral hips   Skin: Negative for rash  Neurological: Negative for dizziness, seizures and weakness  All other systems reviewed and are negative  Past Medical History:   Diagnosis Date   • Arthritis 2010   • Asthma 2010   • Cataract     resolved: 7/20/2016   • Glaucoma    • Sciatica, right side     last assessed: 7/20/2016       Past Surgical History:   Procedure Laterality Date   • LUMBAR EPIDURAL INJECTION N/A 8/11/2022    Procedure: L5 S1 LUMBAR epidural steroid injection (35642); Surgeon: Varun Trinidad MD;  Location: [facility] OR;  Service: Pain Management    • LUMBAR EPIDURAL INJECTION N/A 9/30/2022    Procedure: L5 S1 LUMBAR epidural steroid injection ( 11640); Surgeon: Varun Trinidad MD;  Location: [facility] OR;  Service: Pain Management    • LUMBAR EPIDURAL INJECTION N/A 1/4/2023    Procedure: L5 S1 LUMBAR epidural steroid injection (88903);   Surgeon: Gillian Avitia DO;  Location: [facility] OR;  Service: Pain Management    • TONSILLECTOMY         Family History   Problem Relation Age of Onset   • Coronary artery disease Mother    • Cancer Mother • Coronary artery disease Father    • Substance Abuse Daughter    • Mental illness Neg Hx        Social History     Occupational History   • Not on file   Tobacco Use   • Smoking status: Former     Packs/day: 2 00     Years: 20 00     Pack years: 40 00     Types: Cigarettes     Start date: 1966     Quit date: 1980     Years since quittin 1   • Smokeless tobacco: Never   Vaping Use   • Vaping Use: Never used   Substance and Sexual Activity   • Alcohol use: Not Currently   • Drug use: Never   • Sexual activity: Not Currently     Partners: Female         Current Outpatient Medications:   •  zolpidem (AMBIEN) 10 mg tablet, Take 1 tablet (10 mg total) by mouth daily at bedtime as needed for sleep, Disp: 30 tablet, Rfl: 0  •  Asmanex, 30 Metered Doses, 110 MCG/ACT AEPB inhaler, INHALE 1 PUFF BY MOUTH TWICE DAILY   RINSE MOUTH AFTER USE, Disp: 3 each, Rfl: 3  •  finasteride (PROSCAR) 5 mg tablet, Take 5 mg by mouth daily, Disp: , Rfl:   •  glucosamine-chondroitin 500-400 MG tablet, Take 1 tablet by mouth 3 (three) times a day, Disp: , Rfl:   •  Melatonin 10 MG TABS, Take by mouth At bedtime, Disp: , Rfl:   •  Mirabegron ER 50 MG TB24, Take 1 tablet (50 mg total) by mouth in the morning, Disp: 90 tablet, Rfl: 3  •  multivitamin (THERAGRAN) TABS, Take 1 tablet by mouth daily, Disp: , Rfl:   •  nabumetone (RELAFEN) 750 mg tablet, Take 1 tablet (750 mg total) by mouth 2 (two) times a day, Disp: 180 tablet, Rfl: 1  •  Omega-3 Fatty Acids (FISH OIL PO), Take by mouth in the morning, Disp: , Rfl:   •  Probiotic Product (PROBIOTIC PO), Take by mouth, Disp: , Rfl:   •  rosuvastatin (CRESTOR) 20 MG tablet, Take 1 tablet (20 mg total) by mouth daily, Disp: 90 tablet, Rfl: 3  •  traZODone (DESYREL) 100 mg tablet, Take 2 tablets (200 mg total) by mouth daily at bedtime, Disp: 60 tablet, Rfl: 3  •  triamcinolone (KENALOG) 0 1 % ointment, Apply topically 2 (two) times a day, Disp: 453 6 g, Rfl: 0    Allergies   Allergen Reactions   • Pollen Extract Nasal Congestion       Physical Exam:    /72   Pulse 64   Temp 98 °F (36 7 °C)   Wt 85 5 kg (188 lb 6 4 oz)   BMI 24 19 kg/m²     Constitutional:normal, well developed, well nourished, alert, in no distress and non-toxic and no overt pain behavior  Eyes:anicteric  HEENT:grossly intact  Neck:supple, symmetric, trachea midline and no masses   Pulmonary:even and unlabored  Cardiovascular:No edema or pitting edema present  Skin:Normal without rashes or lesions and well hydrated  Psychiatric:Mood and affect appropriate  Neurologic:Cranial Nerves II-XII grossly intact  Musculoskeletal:antalgic     Lumbar Spine Exam    Appearance:  Normal lordosis  Palpation/Tenderness:  no tenderness or spasm  Sensory:  no sensory deficits noted  Special Tests:  Left Straight Leg Test:  positive  Right Straight Leg Test:  negative  Left Abhinav's Maneuver:  negative  Right Abhinav's Maneuver:  negative        This document was created using speech voice recognition software  Grammatical errors, random word insertions, pronoun errors, and incomplete sentences are an occasional consequence of this system due to software limitations, ambient noise, and hardware issues  Any formal questions or concerns about content, text, or information contained within the body of this dictation should be directly addressed to the provider for clarification

## 2023-03-03 NOTE — TELEPHONE ENCOUNTER
His procedure was moved to 5/5/23 because Dr Tatiana Morris will not be in the OR on 5/4/23   Pt is aware and appreciated the call

## 2023-03-06 ENCOUNTER — OFFICE VISIT (OUTPATIENT)
Dept: FAMILY MEDICINE CLINIC | Facility: CLINIC | Age: 78
End: 2023-03-06

## 2023-03-06 VITALS
RESPIRATION RATE: 12 BRPM | HEART RATE: 72 BPM | SYSTOLIC BLOOD PRESSURE: 126 MMHG | BODY MASS INDEX: 25.58 KG/M2 | OXYGEN SATURATION: 97 % | HEIGHT: 73 IN | WEIGHT: 193 LBS | TEMPERATURE: 97.9 F | DIASTOLIC BLOOD PRESSURE: 62 MMHG

## 2023-03-06 DIAGNOSIS — I10 PRIMARY HYPERTENSION: Primary | ICD-10-CM

## 2023-03-06 DIAGNOSIS — M15.9 GENERALIZED OSTEOARTHRITIS OF MULTIPLE SITES: ICD-10-CM

## 2023-03-06 DIAGNOSIS — J45.20 MILD INTERMITTENT ASTHMA WITHOUT COMPLICATION: ICD-10-CM

## 2023-03-06 DIAGNOSIS — E78.2 MIXED HYPERLIPIDEMIA: ICD-10-CM

## 2023-03-06 DIAGNOSIS — G47.9 DISTURBANCE OF SLEEP: ICD-10-CM

## 2023-03-06 RX ORDER — ZOLPIDEM TARTRATE 10 MG/1
10 TABLET ORAL
Qty: 30 TABLET | Refills: 0 | Status: SHIPPED | OUTPATIENT
Start: 2023-03-06

## 2023-03-06 RX ORDER — ASPIRIN 81 MG/1
81 TABLET ORAL DAILY
COMMUNITY

## 2023-03-06 NOTE — PROGRESS NOTES
Name: Anabela Cortez      : 1945      MRN: [id number]  Encounter Provider: Ann Muñiz MD  Encounter Date: 3/6/2023   Encounter department: [address] [address]  Primary hypertension  Assessment & Plan:  STABLE  DENIES ANY CP, SOB, PALPITATIONS, OR HEADACHE  NOTES NO WATER RETENTION  COMPLIANT WITH MEDICATION  NO CONCERNS    - CONTINUE CURRENT TREATMENT PLAN  - MONITOR DIETARY SODIUM INTAKE  - ENCOURAGE PHYSICAL ACTIVITY  - RV 3 MONTHS      Orders:  -     Comprehensive metabolic panel    2  Mixed hyperlipidemia  Assessment & Plan:  WATCHING CHOLESTEROL INTAKE  COMPLIANT WITH MEDICATION  NO CONCERNS    - CONTINUE TO MONITOR DIETARY CHOL INTAKE  - CONTINUE CURRENT MEDICATION  - ENCOURAGED PHYSICAL ACTIVITY        Orders:  -     Comprehensive metabolic panel  -     Lipid panel    3  Mild intermittent asthma without complication  Assessment & Plan:  Stable        4  Generalized osteoarthritis of multiple sites  Assessment & Plan:  STABLE  DENIES ANY JOINT SWELLING OR REDNESS  JOINT STIFFNESS PRESENT  PAIN MANAGEMENT ADEQUATE    - CONTINUE CURRENT MANAGEMENT  - MEDICATION AS DIRECTED  - CALL / RETURN IF SYMPTOMS WORSEN          BMI Counseling: Body mass index is 25 46 kg/m²  The BMI is above normal  Nutrition recommendations include encouraging healthy choices of fruits and vegetables and moderation in carbohydrate intake  Exercise recommendations include exercising 3-5 times per week  No pharmacotherapy was ordered  Rationale for BMI follow-up plan is due to patient being overweight or obese  Subjective      PATIENT RETURNS FOR ROUTINE EVALUATION OF PATIENT'S MEDICAL ISSUES    INDIVIDUAL MEDICAL ISSUES WITH THEIR CURRENT STATUS, ASSESSMENT AND PLANS ARE LISTED ABOVE        Review of Systems   Constitutional: Negative for chills, fatigue and fever     HENT: Negative for congestion, ear discharge, ear pain, mouth sores, postnasal drip, sore throat and trouble swallowing  Eyes: Negative for pain, discharge and visual disturbance  Respiratory: Negative for cough, shortness of breath and wheezing  Cardiovascular: Negative for chest pain, palpitations and leg swelling  Gastrointestinal: Negative for abdominal distention, abdominal pain, blood in stool, diarrhea and nausea  Endocrine: Negative for polydipsia, polyphagia and polyuria  Genitourinary: Negative for dysuria, frequency, hematuria and urgency  Musculoskeletal: Positive for arthralgias and back pain  Negative for gait problem and joint swelling  Skin: Negative for pallor and rash  Neurological: Negative for dizziness, syncope, speech difficulty, weakness, light-headedness, numbness and headaches  Hematological: Negative for adenopathy  Psychiatric/Behavioral: Negative for behavioral problems, confusion and sleep disturbance  The patient is not nervous/anxious  Current Outpatient Medications on File Prior to Visit   Medication Sig   • Asmanex, 30 Metered Doses, 110 MCG/ACT AEPB inhaler INHALE 1 PUFF BY MOUTH TWICE DAILY   RINSE MOUTH AFTER USE   • aspirin (Aspir-Low) 81 mg EC tablet Take 81 mg by mouth daily   • finasteride (PROSCAR) 5 mg tablet Take 5 mg by mouth daily   • Melatonin 10 MG TABS Take by mouth At bedtime   • Mirabegron ER 50 MG TB24 Take 1 tablet (50 mg total) by mouth in the morning   • multivitamin (THERAGRAN) TABS Take 1 tablet by mouth daily   • nabumetone (RELAFEN) 750 mg tablet Take 1 tablet (750 mg total) by mouth 2 (two) times a day   • Omega-3 Fatty Acids (FISH OIL PO) Take by mouth in the morning   • Probiotic Product (PROBIOTIC PO) Take by mouth   • rosuvastatin (CRESTOR) 20 MG tablet Take 1 tablet (20 mg total) by mouth daily   • traZODone (DESYREL) 100 mg tablet Take 2 tablets (200 mg total) by mouth daily at bedtime   • zolpidem (AMBIEN) 10 mg tablet Take 1 tablet (10 mg total) by mouth daily at bedtime as needed for sleep   • [DISCONTINUED] glucosamine-chondroitin 500-400 MG tablet Take 1 tablet by mouth 3 (three) times a day (Patient not taking: Reported on 3/6/2023)   • [DISCONTINUED] triamcinolone (KENALOG) 0 1 % ointment Apply topically 2 (two) times a day (Patient not taking: Reported on 3/6/2023)       Objective     /62 (BP Location: Left arm, Patient Position: Sitting, Cuff Size: Large)   Pulse 72   Temp 97 9 °F (36 6 °C) (Temporal)   Resp 12   Ht 6' 1" (1 854 m)   Wt 87 5 kg (193 lb)   SpO2 97%   BMI 25 46 kg/m²     Physical Exam  Constitutional:       Appearance: He is well-developed  HENT:      Head: Normocephalic and atraumatic  Eyes:      General:         Right eye: No discharge  Left eye: No discharge  Conjunctiva/sclera: Conjunctivae normal       Pupils: Pupils are equal, round, and reactive to light  Neck:      Thyroid: No thyromegaly  Vascular: No JVD  Cardiovascular:      Rate and Rhythm: Normal rate and regular rhythm  Heart sounds: Normal heart sounds  No murmur heard  Pulmonary:      Effort: Pulmonary effort is normal       Breath sounds: Normal breath sounds  No wheezing or rales  Abdominal:      General: Bowel sounds are normal       Palpations: Abdomen is soft  There is no mass  Tenderness: There is no abdominal tenderness  There is no guarding or rebound  Musculoskeletal:         General: No tenderness or deformity  Normal range of motion  Cervical back: Neck supple  Comments: MILD DJD CHANGES     Lymphadenopathy:      Cervical: No cervical adenopathy  Skin:     General: Skin is warm and dry  Findings: No erythema or rash  Neurological:      Mental Status: He is alert and oriented to person, place, and time  Psychiatric:         Behavior: Behavior normal          Thought Content:  Thought content normal          Judgment: Judgment normal        Mckenzie Samuels MD

## 2023-03-07 LAB
ALBUMIN SERPL BCP-MCNC: 4.1 G/DL (ref 3.5–5)
ALP SERPL-CCNC: 73 U/L (ref 46–116)
ALT SERPL W P-5'-P-CCNC: 24 U/L (ref 12–78)
ANION GAP SERPL CALCULATED.3IONS-SCNC: 4 MMOL/L (ref 4–13)
AST SERPL W P-5'-P-CCNC: 21 U/L (ref 5–45)
BILIRUB SERPL-MCNC: 0.62 MG/DL (ref 0.2–1)
BUN SERPL-MCNC: 11 MG/DL (ref 5–25)
CALCIUM SERPL-MCNC: 9.2 MG/DL (ref 8.3–10.1)
CHLORIDE SERPL-SCNC: 99 MMOL/L (ref 96–108)
CHOLEST SERPL-MCNC: 103 MG/DL
CO2 SERPL-SCNC: 30 MMOL/L (ref 21–32)
CREAT SERPL-MCNC: 0.96 MG/DL (ref 0.6–1.3)
GFR SERPL CREATININE-BSD FRML MDRD: 76 ML/MIN/1.73SQ M
GLUCOSE SERPL-MCNC: 112 MG/DL (ref 65–140)
HDLC SERPL-MCNC: 51 MG/DL
LDLC SERPL CALC-MCNC: 37 MG/DL (ref 0–100)
NONHDLC SERPL-MCNC: 52 MG/DL
POTASSIUM SERPL-SCNC: 4.3 MMOL/L (ref 3.5–5.3)
PROT SERPL-MCNC: 6.6 G/DL (ref 6.4–8.4)
SODIUM SERPL-SCNC: 133 MMOL/L (ref 135–147)
TRIGL SERPL-MCNC: 76 MG/DL

## 2023-05-01 ENCOUNTER — TELEPHONE (OUTPATIENT)
Dept: FAMILY MEDICINE CLINIC | Facility: CLINIC | Age: 78
End: 2023-05-01

## 2023-05-01 DIAGNOSIS — G47.9 DISTURBANCE OF SLEEP: Primary | ICD-10-CM

## 2023-05-01 RX ORDER — ALPRAZOLAM 0.5 MG/1
0.5 TABLET ORAL
Qty: 30 TABLET | Refills: 0 | Status: SHIPPED | OUTPATIENT
Start: 2023-05-01 | End: 2023-08-07 | Stop reason: SDUPTHER

## 2023-05-01 NOTE — TELEPHONE ENCOUNTER
Glenda Pavon left a message on the Clinical Voicemail Line asking for a rx for alprazolam 1mg  He states he has been having trouble sleeping and took 1 of his wife Loraine's    He asked if that could be called into [facility]

## 2023-05-05 ENCOUNTER — HOSPITAL ENCOUNTER (OUTPATIENT)
Facility: AMBULARY SURGERY CENTER | Age: 78
Setting detail: OUTPATIENT SURGERY
Discharge: HOME | End: 2023-05-05
Attending: ANESTHESIOLOGY | Admitting: ANESTHESIOLOGY

## 2023-05-05 ENCOUNTER — APPOINTMENT (OUTPATIENT)
Dept: RADIOLOGY | Facility: HOSPITAL | Age: 78
End: 2023-05-05

## 2023-05-05 VITALS
TEMPERATURE: 96.1 F | HEART RATE: 66 BPM | SYSTOLIC BLOOD PRESSURE: 157 MMHG | OXYGEN SATURATION: 99 % | HEIGHT: 73 IN | RESPIRATION RATE: 18 BRPM | BODY MASS INDEX: 25.18 KG/M2 | WEIGHT: 190 LBS | DIASTOLIC BLOOD PRESSURE: 81 MMHG

## 2023-05-05 RX ORDER — LIDOCAINE WITH 8.4% SOD BICARB 0.9%(10ML)
SYRINGE (ML) INJECTION AS NEEDED
Status: DISCONTINUED | OUTPATIENT
Start: 2023-05-05 | End: 2023-05-05 | Stop reason: HOSPADM

## 2023-05-05 RX ORDER — METHYLPREDNISOLONE ACETATE 80 MG/ML
INJECTION, SUSPENSION INTRA-ARTICULAR; INTRALESIONAL; INTRAMUSCULAR; SOFT TISSUE AS NEEDED
Status: DISCONTINUED | OUTPATIENT
Start: 2023-05-05 | End: 2023-05-05 | Stop reason: HOSPADM

## 2023-05-05 NOTE — DISCHARGE INSTRUCTIONS
Epidural Steroid Injection   WHAT YOU NEED TO KNOW:   An epidural steroid injection (MARVIN) is a procedure to inject steroid medicine into the epidural space  The epidural space is between your spinal cord and vertebrae  Steroids reduce inflammation and fluid buildup in your spine that may be causing pain  You may be given pain medicine along with the steroids  ACTIVITY  Do not drive or operate machinery today  No strenuous activity today - bending, lifting, etc   You may resume normal activites starting tomorrow - start slowly and as tolerated  You may shower today, but no tub baths or hot tubs  You may have numbness for several hours from the local anesthetic  Please use caution and common sense, especially with weight-bearing activities  CARE OF THE INJECTION SITE  If you have soreness or pain, apply ice to the area today (20 minutes on/20 minutes off)  Starting tomorrow, you may use warm, moist heat or ice if needed  You may have an increase or change in your discomfort for 36-48 hours after your treatment  Apply ice and continue with any pain medication you have been prescribed  Notify the [facility] if you have any of the following: redness, drainage, swelling, headache, stiff neck or fever above 100°F     SPECIAL INSTRUCTIONS  Our office will contact you in approximately 7 days for a progress report  MEDICATIONS  Continue to take all routine medications  Our office may have instructed you to hold some medications  As no general anesthesia was used in today's procedure, you should not experience any side effects related to anesthesia  If you are diabetic, the steroids used in today's injection may temporarily increase your blood sugar levels after the first few days after your injection  Please keep a close eye on your sugars and alert the doctor who manages your diabetes if your sugars are significantly high from your baseline or you are symptomatic       If you have a problem specifically related to your procedure, please call our office at [phone]  Problems not related to your procedure should be directed to your primary care physician

## 2023-05-05 NOTE — H&P
History of Present Illness: The patient is a 69 y o  male who presents with complaints of low back pain    Past Medical History:   Diagnosis Date    Arthritis 2010    Asthma 2010    Cataract     resolved: 7/20/2016    Glaucoma     Sciatica, right side     last assessed: 7/20/2016       Past Surgical History:   Procedure Laterality Date    LUMBAR EPIDURAL INJECTION N/A 8/11/2022    Procedure: L5 S1 LUMBAR epidural steroid injection (56557); Surgeon: Joshua Mendez MD;  Location: [facility] MAIN OR;  Service: Pain Management     LUMBAR EPIDURAL INJECTION N/A 9/30/2022    Procedure: L5 S1 LUMBAR epidural steroid injection ( 11549); Surgeon: Joshua Mendez MD;  Location: [facility] OR;  Service: Pain Management     LUMBAR EPIDURAL INJECTION N/A 1/4/2023    Procedure: L5 S1 LUMBAR epidural steroid injection ([id number]); Surgeon: Renee Fregoso DO;  Location: [facility] OR;  Service: Pain Management     TONSILLECTOMY         No current facility-administered medications for this encounter      Allergies   Allergen Reactions    Pollen Extract Nasal Congestion       Physical Exam:   Vitals:    05/05/23 1147   BP: 134/80   Pulse: 55   Resp: 18   Temp: (!) 96 1 °F (35 6 °C)   SpO2: 95%     General: Awake, Alert, Oriented x 3, Mood and affect appropriate  Respiratory: Respirations even and unlabored  Cardiovascular: Peripheral pulses intact; no edema  Musculoskeletal Exam: Tenderness in lumbar spine region    ASA Score: 2    Patient/Chart Verification  Patient ID Verified: Verbal, Armband  ID Band Applied: Yes  Consents Confirmed: Procedural  H&P( within 30 days) Verified: Yes  Interval H&P(within 24 hr) Complete (required for Outpatients and Surgery Admit only): Yes  Beta Blocker given : N/A  Pre-op Lab/Test Results Available: N/A  Does Patient Have a Prosthetic Device/Implant: No    Assessment: Low back pain  Plan: Proceed with L4-L5 lumbar epidural steroid injection

## 2023-05-05 NOTE — OP NOTE
"ATTENDING PHYSICIAN:  Oumar Jaramillo MD     PROCEDURE:  Lumbar interlaminar midline epidural steroid injection with steroid and local anesthetic under fluoroscopy at the L4-L5 level  PREPROCEDURE DIAGNOSIS: Low back pain  POSTPROCEDURE DIAGNOSIS: Low back pain  ANESTHESIA:  Local     ESTIMATED BLOOD LOSS:  Minimal     COMPLICATIONS:  None  LOCATION:  [facility]  CONSENT:  Today's procedure, its potential benefits as well as its risks and potential side effects were reviewed  Discussed risks of the procedure including bleeding, infection, nerve irritation or damage, reactions to the medications, headache, failure of the pain to improve, and potential worsening of the pain were explained to the patient who verbalized understanding and who wished to proceed  Written informed consent was thereby obtained  DESCRIPTION OF THE PROCEDURE:  After written informed consent was obtained, the patient was taken to the fluoroscopy suite and placed in the prone position  Anatomical landmarks were identified by way of fluoroscopy in multiple views  The skin of the lumbar region was prepped and draped in the usual sterile fashion  Strict aseptic technique was utilized  The skin and subcutaneous tissues at the needle entry site were infiltrated with 3 mL of 1% preservative-free lidocaine using a 25-gauge 1-1/2-inch needle  A 20-gauge Tuohy needle was then incrementally advanced under fluoroscopy using a loss of resistance technique  Upon entering into the epidural space, a positive loss of resistance to air was noted and a characteristic \"pop\" was felt  Proper placement into the epidural space was confirmed with fluoroscopy in multiple views and by continued loss of resistance after injection of 1 mL of sterile preservative-free normal saline as well as the administration of contrast to delineate the epidural space  There were no paresthesias reported   After negative aspiration for " CSF or heme, a 6 mL injectate consisting of 1 mL of Depo-Medrol 80 mg/mL mixed with 5 mL of preservative-free normal saline was slowly injected  The patient tolerated the procedure well and all needles were removed with the tips intact  Hemostasis was maintained  There were no apparent paresthesias or complications  The skin was wiped clean and a Band-Aid was placed as appropriate  The patient was monitored for an appropriate period of time following the procedure and remained hemodynamically stable and neurovascularly intact following the procedure  The patient was ultimately discharged to home with supervision in good condition and instructed to call the office in a few days for an update or sooner as warranted  I was present and participated in all key and critical portions of this procedure      Emani Vallejo MD  5/5/2023  12:42 PM

## 2023-05-12 ENCOUNTER — TELEPHONE (OUTPATIENT)
Dept: PAIN MEDICINE | Facility: CLINIC | Age: 78
End: 2023-05-12

## 2023-05-15 DIAGNOSIS — G47.9 DISTURBANCE OF SLEEP: ICD-10-CM

## 2023-05-15 RX ORDER — ZOLPIDEM TARTRATE 10 MG/1
10 TABLET ORAL
Qty: 30 TABLET | Refills: 0 | Status: SHIPPED | OUTPATIENT
Start: 2023-05-15

## 2023-05-16 DIAGNOSIS — G47.9 DISTURBANCE OF SLEEP: ICD-10-CM

## 2023-05-16 RX ORDER — TRAZODONE HYDROCHLORIDE 100 MG/1
200 TABLET ORAL
Qty: 60 TABLET | Refills: 3 | Status: SHIPPED | OUTPATIENT
Start: 2023-05-16

## 2023-06-13 DIAGNOSIS — G47.9 DISTURBANCE OF SLEEP: ICD-10-CM

## 2023-06-13 RX ORDER — ZOLPIDEM TARTRATE 10 MG/1
10 TABLET ORAL
Qty: 30 TABLET | Refills: 0 | Status: SHIPPED | OUTPATIENT
Start: 2023-06-13

## 2023-07-11 ENCOUNTER — TELEPHONE (OUTPATIENT)
Dept: PAIN MEDICINE | Facility: CLINIC | Age: 78
End: 2023-07-11

## 2023-07-11 NOTE — TELEPHONE ENCOUNTER
Caller: patient    Doctor: eloise    Reason for call: requesting back injection    Call back#: Phone:  [phone]

## 2023-07-11 NOTE — TELEPHONE ENCOUNTER
Patient has had epidural injections in the lumbar spine on 8/11/2022, 9/30/2022, 1/4/2023, and 5/5/2023. I will forward this message to Dr. Greg Samuels typically should not exceed 4 epidural steroid injections in a year. Dr. Greg Samuels suggest that the patient come in for reevaluation. Since the pain could be coming from [facility] joint or piriformis.

## 2023-07-11 NOTE — TELEPHONE ENCOUNTER
S/w pt. Pt had an L4-L5 LESI on 5/5 and is requesting to repeat this procedure but at a lower level. Pt id have an L5-S1 LESI on 1/4/23. Per pt his pain is the same just a little lower. Pt had 80% relief which lasted approximately 2 months and his current pain level is a 4-6/10 at times. Pt was last seen in the office on 2/28/23. Please advise- Is pt able to repeat LESI?

## 2023-07-12 DIAGNOSIS — G47.9 DISTURBANCE OF SLEEP: ICD-10-CM

## 2023-07-12 RX ORDER — ZOLPIDEM TARTRATE 10 MG/1
10 TABLET ORAL
Qty: 30 TABLET | Refills: 0 | Status: SHIPPED | OUTPATIENT
Start: 2023-07-12

## 2023-07-13 ENCOUNTER — OFFICE VISIT (OUTPATIENT)
Dept: PAIN MEDICINE | Facility: CLINIC | Age: 78
End: 2023-07-13
Payer: COMMERCIAL

## 2023-07-13 ENCOUNTER — TELEPHONE (OUTPATIENT)
Dept: PAIN MEDICINE | Facility: CLINIC | Age: 78
End: 2023-07-13

## 2023-07-13 VITALS
HEART RATE: 78 BPM | DIASTOLIC BLOOD PRESSURE: 75 MMHG | TEMPERATURE: 98.2 F | BODY MASS INDEX: 25.2 KG/M2 | WEIGHT: 191 LBS | SYSTOLIC BLOOD PRESSURE: 145 MMHG

## 2023-07-13 DIAGNOSIS — M54.16 LUMBAR RADICULOPATHY: ICD-10-CM

## 2023-07-13 DIAGNOSIS — M54.42 CHRONIC BILATERAL LOW BACK PAIN WITH BILATERAL SCIATICA: ICD-10-CM

## 2023-07-13 DIAGNOSIS — M54.41 CHRONIC BILATERAL LOW BACK PAIN WITH BILATERAL SCIATICA: ICD-10-CM

## 2023-07-13 DIAGNOSIS — M48.062 SPINAL STENOSIS OF LUMBAR REGION WITH NEUROGENIC CLAUDICATION: ICD-10-CM

## 2023-07-13 DIAGNOSIS — G89.4 CHRONIC PAIN SYNDROME: Primary | ICD-10-CM

## 2023-07-13 DIAGNOSIS — G89.29 CHRONIC BILATERAL LOW BACK PAIN WITH BILATERAL SCIATICA: ICD-10-CM

## 2023-07-13 PROCEDURE — 99214 OFFICE O/P EST MOD 30 MIN: CPT | Performed by: ANESTHESIOLOGY

## 2023-07-13 NOTE — TELEPHONE ENCOUNTER
Scheduled patient for LESI 8/18/23  Patient denies RX blood thinners/ NSAIDS  Nothing to eat or drink 1 hour prior to procedure  Needs to arrange transportation  Proper clothing for procedure  No vaccines 2 weeks prior or after procedure  If ill or place on antibiotics, please call to reschedule

## 2023-07-13 NOTE — PATIENT INSTRUCTIONS
What is the epidural space? The covering over the nerve roots in the spine is call the dura. The space surrounding this dura is call the epidural space. This space is commonly used to deliver medications to the spine. Nerves travel through the epidural space before they travel down into your legs. The nerves leave the spine from small nerve holes, foramen. Inflammation of these nerve roots may cause pain in your neck regions including shoulders and arms, and lower back regions including hip, buttocks and legs. These nerve roots may become inflamed due to irritation from a damaged disc or from contact with the bone spur. What is an epidural steroid injection and why that helps? An epidural steroid injection places anti-inflammatory medicine into the epidural space to stop nerve root inflammation, therefore hopefully reducing hip, buttock and leg pain. By stopping or limiting nerve root inflammation we may be able to reduce your pain. The epidural injection may assist the injury to heal by reducing inflammation. Although not always helpful, it usually reduces pain within 3 to 7 days and can take up to 2 weeks for full affect. It may provide permanent relief or provide a period of relief that will allow other treatments like physical therapy to be more effective. What happens during the procedure? While laying face down on an x-ray table your skin will be well cleaned. The physician will numb a small area of skin in your back which may sting for a few seconds. Next, the physician will use x-ray guidance to direct a small needle into the epidural space. He will then inject contrast dye to confirm that the medicine spreads to the affected nerve root in the epidural space. After this, the physician will inject the combination of numbing medicine and anti-inflammatory steroid.         What happens after the procedure? A dressing may be applied to the injection site. Your will remain in the procedure suite for about 15 to 20 minutes and the nurse will monitor blood pressure and pulse. The nurse will review your discharge instructions and you will be able to go home. You may experience numbness or weakness to the affected limb for few hours after the procedure. If this happens do not walk without assistance. Your physician may refer you to a physical therapist or chiropractor immediately afterwards while the numbing medicine is effective and over the next two weeks while the cortisone is working. It may be beneficial to repeat the procedure in about 2 to 4 weeks. A series of treatments off improved more helpful than a single injection.

## 2023-07-13 NOTE — PROGRESS NOTES
Pain Medicine Follow-Up Note    Assessment:  1. Chronic pain syndrome    2. Chronic bilateral low back pain with bilateral sciatica    3. Spinal stenosis of lumbar region with neurogenic claudication    4. Lumbar radiculopathy        Plan:  My impressions and treatment recommendations were discussed in detail with the patient who verbalized understanding and had no further questions. The patient is reporting pain primarily in the low back and bilateral lower extremities. He states that the L4-L5 lumbar epidural steroid injection on May 5, 2023 did alleviate his pain considerably, but he is feeling his pain lower. Previously, he did undergo L5-S1 lumbar epidural steroid injections by myself. He would like to repeat the L5-S1 lumbar epidural steroid injection since he does feel that his pain is returning and he is feeling pain lower than the level that was done previously. As such, I felt it reasonable to have him undergo a repeat L5-S1 lumbar epidural steroid injection at this time. The procedures, its risks, and benefits were explained in detail to the patient. Risks include but are not limited to bleeding, infection, hematoma formation, abscess formation, weakness, headache, failure the pain to improve, nerve irritation or damage, and potential worsening of the pain. The patient verbalized understanding and wished to proceed with the procedure. Follow-up is planned in 4 weeks time or sooner as warranted. Discharge instructions were provided. I personally saw and examined the patient and I agree with the above discussed plan of care. History of Present Illness:    Khushboo Knowles is a 69 y.o. male who [facility] for interval re-evaluation of the above stated pain complaints. The patient has a past medical and chronic pain history as outlined in the assessment section.  He was last seen on May 5, 2023 at which time he underwent a L4-L5 lumbar epidural steroid injection. At today's office visit, the patient's pain score is 6 out of 10 on the verbal numerical pain rating scale. The patient states that his pain is worse in the morning. His pain is intermittent. He reports the quality of his pain is dull/aching. He states that his symptoms are primarily in the low back with radiation into the bilateral lower extremities and what appears to be the bilateral S1 distribution. He reports that the L4-L5 lumbar epidural steroid injection on May 5, 2023 was very helpful, but previous injections at the L5-S1 level were more helpful. He would like to have a repeat L5-S1 lumbar epidural steroid injection currently. Other than as stated above, the patient denies any interval changes in medications, medical condition, mental condition, symptoms, or allergies since the last office visit. Review of Systems:    Review of Systems   Constitutional: Negative for unexpected weight change. HENT: Negative for ear pain. Eyes: Negative for visual disturbance. Respiratory: Negative for shortness of breath and wheezing. Gastrointestinal: Negative for abdominal pain. Musculoskeletal: Positive for back pain and gait problem. Pain in the back that radiates to hips   Neurological: Negative for weakness and numbness. Psychiatric/Behavioral: Positive for sleep disturbance. Negative for decreased concentration. Past Medical History:   Diagnosis Date   • Arthritis 2010   • Asthma 2010   • Cataract     resolved: 7/20/2016   • Glaucoma    • Sciatica, right side     last assessed: 7/20/2016       Past Surgical History:   Procedure Laterality Date   • EPIDURAL BLOCK INJECTION N/A 5/5/2023    Procedure: BLOCK / INJECTION EPIDURAL STEROID LUMBAR L4-L5 LESI;  Surgeon: Liv Ewing MD;  Location: [address] [place] [address] MAIN OR;  Service: Pain Management    • LUMBAR EPIDURAL INJECTION N/A 8/11/2022    Procedure: L5 S1 LUMBAR epidural steroid injection (07655);   Surgeon: Teressa Fernandez Marcelle Amato MD;  Location[facility] [address] [place] [address] [facility];  Service: Pain Management    • LUMBAR EPIDURAL INJECTION N/A 2022    Procedure: L5 S1 LUMBAR epidural steroid injection ( 02733); Surgeon: Marzena Prasad MD;  Location: [facility] MAIN OR;  Service: Pain Management    • LUMBAR EPIDURAL INJECTION N/A 2023    Procedure: L5 S1 LUMBAR epidural steroid injection (75611); Surgeon: Drew Mathis DO;  Location: [facility] MAIN OR;  Service: Pain Management    • TONSILLECTOMY         Family History   Problem Relation Age of Onset   • Coronary artery disease Mother    • Cancer Mother    • Coronary artery disease Father    • Substance Abuse Daughter    • Mental illness Neg Hx        Social History     Occupational History   • Not on file   Tobacco Use   • Smoking status: Former     Packs/day: 2.00     Years: 20.00     Total pack years: 40.00     Types: Cigarettes     Start date: 1966     Quit date: 1980     Years since quittin.5   • Smokeless tobacco: Never   Vaping Use   • Vaping Use: Never used   Substance and Sexual Activity   • Alcohol use: Not Currently   • Drug use: Never   • Sexual activity: Not Currently     Partners: Female         Current Outpatient Medications:   •  ALPRAZolam (XANAX) 0.5 mg tablet, Take 1 tablet (0.5 mg total) by mouth daily at bedtime as needed for anxiety, Disp: 30 tablet, Rfl: 0  •  Asmanex, 30 Metered Doses, 110 MCG/ACT AEPB inhaler, INHALE 1 PUFF BY MOUTH TWICE DAILY.  RINSE MOUTH AFTER USE, Disp: 3 each, Rfl: 3  •  finasteride (PROSCAR) 5 mg tablet, Take 5 mg by mouth daily, Disp: , Rfl:   •  Mirabegron ER 50 MG TB24, Take 1 tablet (50 mg total) by mouth in the morning, Disp: 90 tablet, Rfl: 3  •  multivitamin (THERAGRAN) TABS, Take 1 tablet by mouth daily, Disp: , Rfl:   •  nabumetone (RELAFEN) 750 mg tablet, Take 1 tablet (750 mg total) by mouth 2 (two) times a day, Disp: 180 tablet, Rfl: 1  •  Omega-3 Fatty Acids (FISH OIL PO), Take by mouth in the morning, Disp: , Rfl:   • Probiotic Product (PROBIOTIC PO), Take by mouth, Disp: , Rfl:   •  rosuvastatin (CRESTOR) 20 MG tablet, Take 1 tablet (20 mg total) by mouth daily, Disp: 90 tablet, Rfl: 3  •  traZODone (DESYREL) 100 mg tablet, Take 2 tablets (200 mg total) by mouth daily at bedtime, Disp: 60 tablet, Rfl: 3  •  zolpidem (AMBIEN) 10 mg tablet, Take 1 tablet (10 mg total) by mouth daily at bedtime as needed for sleep, Disp: 30 tablet, Rfl: 0  •  aspirin (ECOTRIN LOW STRENGTH) 81 mg EC tablet, Take 81 mg by mouth daily (Patient not taking: Reported on 7/13/2023), Disp: , Rfl:   •  Melatonin 10 MG TABS, Take by mouth At bedtime (Patient not taking: Reported on 7/13/2023), Disp: , Rfl:     Allergies   Allergen Reactions   • Pollen Extract Nasal Congestion       Physical Exam:    /75   Pulse 78   Temp 98.2 °F (36.8 °C)   Wt 86.6 kg (191 lb)   BMI 25.20 kg/m²     Constitutional:normal, well developed, well nourished, alert, in no distress and non-toxic and no overt pain behavior.   Eyes:anicteric  HEENT:grossly intact  Neck:supple, symmetric, trachea midline and no masses   Pulmonary:even and unlabored  Cardiovascular:No edema or pitting edema present  Skin:Normal without rashes or lesions and well hydrated  Psychiatric:Mood and affect appropriate  Neurologic:Cranial Nerves II-XII grossly intact  Musculoskeletal:normal

## 2023-07-18 DIAGNOSIS — M15.9 GENERALIZED OSTEOARTHRITIS OF MULTIPLE SITES: ICD-10-CM

## 2023-07-18 RX ORDER — NABUMETONE 750 MG/1
750 TABLET, FILM COATED ORAL 2 TIMES DAILY
Qty: 180 TABLET | Refills: 1 | Status: SHIPPED | OUTPATIENT
Start: 2023-07-18

## 2023-07-18 NOTE — TELEPHONE ENCOUNTER
Requested medication(s) are due for refill today: Yes  Patient has already received a courtesy refill: No  Other reason request has been forwarded to provider: Refill failed protocol - HGB in normal range and within 360 days. What dx can I use to order a CBC for Medicare?

## 2023-07-19 DIAGNOSIS — Z00.00 MEDICARE ANNUAL WELLNESS VISIT, SUBSEQUENT: Primary | ICD-10-CM

## 2023-07-19 DIAGNOSIS — I10 PRIMARY HYPERTENSION: ICD-10-CM

## 2023-07-19 DIAGNOSIS — E78.2 MIXED HYPERLIPIDEMIA: ICD-10-CM

## 2023-07-19 DIAGNOSIS — K21.9 GASTROESOPHAGEAL REFLUX DISEASE WITHOUT ESOPHAGITIS: ICD-10-CM

## 2023-07-19 DIAGNOSIS — M15.9 GENERALIZED OSTEOARTHRITIS OF MULTIPLE SITES: ICD-10-CM

## 2023-07-19 DIAGNOSIS — N40.0 HYPERPLASIA OF PROSTATE: ICD-10-CM

## 2023-07-19 RX ORDER — NABUMETONE 750 MG/1
750 TABLET, FILM COATED ORAL 2 TIMES DAILY
Qty: 180 TABLET | Refills: 0 | OUTPATIENT
Start: 2023-07-19

## 2023-08-03 ENCOUNTER — TELEPHONE (OUTPATIENT)
Dept: FAMILY MEDICINE CLINIC | Facility: CLINIC | Age: 78
End: 2023-08-03

## 2023-08-03 NOTE — TELEPHONE ENCOUNTER
Spoke to the office of Dr. Kary Mancuso - Yes he has resigned. That is all they could tell me. They suggested Dr. Stephany Nelson, who is another dr in their office.

## 2023-08-03 NOTE — TELEPHONE ENCOUNTER
Willard Carter left message on [employer], this is Chelsea Clefelipa. I'm trying to get a message into Doctor Manny Mitchell. He referred me to Doctor Yves Leonard for back pain and I now understand Doctor Neena Gibbons is no longer working out of [facility]. I'm wondering if Doctor Manny Mitchell knows what happened or where he is. And could Doctor Manny Mitchell refer me to another lower back pain? Thank you.

## 2023-08-04 ENCOUNTER — TELEPHONE (OUTPATIENT)
Dept: PAIN MEDICINE | Facility: CLINIC | Age: 78
End: 2023-08-04

## 2023-08-04 NOTE — TELEPHONE ENCOUNTER
Caller: Grieslda Lakhani     Doctor: Rashi De La Fuente     Reason for call: ready to schedule next procedure please advise     Thank you     Call back#: [phone]

## 2023-08-04 NOTE — TELEPHONE ENCOUNTER
S/W pt. Pt requested repeat injection of LESI. Pt had it scheduled for 8/18 and then cancelled it b/c pain was better and now the pain came back. Current pain level:  5-6/10    Percentage relief from previous injection: 80%    Duration of pain relief from previous injection:  lasted 5-6 weeks    Please advise.

## 2023-08-07 DIAGNOSIS — G47.9 DISTURBANCE OF SLEEP: ICD-10-CM

## 2023-08-07 DIAGNOSIS — M10.9 ACUTE GOUT, UNSPECIFIED CAUSE, UNSPECIFIED SITE: Primary | ICD-10-CM

## 2023-08-07 DIAGNOSIS — J45.20 MILD INTERMITTENT ASTHMA WITHOUT COMPLICATION: ICD-10-CM

## 2023-08-07 RX ORDER — ALPRAZOLAM 0.5 MG/1
0.5 TABLET ORAL
Qty: 30 TABLET | Refills: 0 | Status: SHIPPED | OUTPATIENT
Start: 2023-08-07

## 2023-08-07 RX ORDER — MOMETASONE FUROATE 110 UG/1
1 INHALANT RESPIRATORY (INHALATION) 2 TIMES DAILY
Qty: 3 EACH | Refills: 3 | Status: SHIPPED | OUTPATIENT
Start: 2023-08-07

## 2023-08-07 RX ORDER — COLCHICINE 0.6 MG/1
0.6 TABLET ORAL 2 TIMES DAILY
Qty: 10 TABLET | Refills: 1 | Status: SHIPPED | OUTPATIENT
Start: 2023-08-07 | End: 2023-08-12

## 2023-08-07 RX ORDER — ZOLPIDEM TARTRATE 10 MG/1
10 TABLET ORAL
Qty: 30 TABLET | Refills: 0 | Status: SHIPPED | OUTPATIENT
Start: 2023-08-07

## 2023-08-07 NOTE — TELEPHONE ENCOUNTER
Scheduled patient for 8/30/23  Patient denies RX blood thinners/ NSAIDS  Nothing to eat or drink 1 hour prior to procedure  Needs to arrange transportation  Proper clothing for procedure  No vaccines 2 weeks prior or after procedure  If ill or place on antibiotics, please call to reschedule

## 2023-08-07 NOTE — TELEPHONE ENCOUNTER
Rx sent to pharmacy. No further action required.     [address] [facility] Jesús Dyer

## 2023-08-07 NOTE — TELEPHONE ENCOUNTER
Zolpidiem - he has about 3 tablets left. He is requesting the ER tablet    Raj Gonzales is also requesting to have colchicine 0.6 mg.  He just used up whatever he had. Last time you prescribed it was 3/15/21.   He used it for occasional gout

## 2023-08-12 LAB
ALBUMIN SERPL-MCNC: 4.2 G/DL (ref 3.6–5.1)
ALBUMIN/GLOB SERPL: 2.2 (CALC) (ref 1–2.5)
ALP SERPL-CCNC: 63 U/L (ref 35–144)
ALT SERPL-CCNC: 14 U/L (ref 9–46)
AST SERPL-CCNC: 18 U/L (ref 10–35)
BASOPHILS # BLD AUTO: 22 CELLS/UL (ref 0–200)
BASOPHILS NFR BLD AUTO: 0.5 %
BILIRUB SERPL-MCNC: 0.5 MG/DL (ref 0.2–1.2)
BUN SERPL-MCNC: 7 MG/DL (ref 7–25)
BUN/CREAT SERPL: ABNORMAL (CALC) (ref 6–22)
CALCIUM SERPL-MCNC: 9.5 MG/DL (ref 8.6–10.3)
CHLORIDE SERPL-SCNC: 99 MMOL/L (ref 98–110)
CHOLEST SERPL-MCNC: 103 MG/DL
CHOLEST/HDLC SERPL: 2.1 (CALC)
CO2 SERPL-SCNC: 34 MMOL/L (ref 20–32)
CREAT SERPL-MCNC: 0.97 MG/DL (ref 0.7–1.28)
EOSINOPHIL # BLD AUTO: 90 CELLS/UL (ref 15–500)
EOSINOPHIL NFR BLD AUTO: 2.1 %
ERYTHROCYTE [DISTWIDTH] IN BLOOD BY AUTOMATED COUNT: 12.3 % (ref 11–15)
GFR/BSA.PRED SERPLBLD CYS-BASED-ARV: 81 ML/MIN/1.73M2
GLOBULIN SER CALC-MCNC: 1.9 G/DL (CALC) (ref 1.9–3.7)
GLUCOSE SERPL-MCNC: 86 MG/DL (ref 65–99)
HCT VFR BLD AUTO: 39.7 % (ref 38.5–50)
HDLC SERPL-MCNC: 50 MG/DL
HGB BLD-MCNC: 13.3 G/DL (ref 13.2–17.1)
LDLC SERPL CALC-MCNC: 36 MG/DL (CALC)
LYMPHOCYTES # BLD AUTO: 1488 CELLS/UL (ref 850–3900)
LYMPHOCYTES NFR BLD AUTO: 34.6 %
MCH RBC QN AUTO: 31.3 PG (ref 27–33)
MCHC RBC AUTO-ENTMCNC: 33.5 G/DL (ref 32–36)
MCV RBC AUTO: 93.4 FL (ref 80–100)
MONOCYTES # BLD AUTO: 417 CELLS/UL (ref 200–950)
MONOCYTES NFR BLD AUTO: 9.7 %
NEUTROPHILS # BLD AUTO: 2283 CELLS/UL (ref 1500–7800)
NEUTROPHILS NFR BLD AUTO: 53.1 %
NONHDLC SERPL-MCNC: 53 MG/DL (CALC)
PLATELET # BLD AUTO: 194 THOUSAND/UL (ref 140–400)
PMV BLD REES-ECKER: 10.4 FL (ref 7.5–12.5)
POTASSIUM SERPL-SCNC: 5.7 MMOL/L (ref 3.5–5.3)
PROT SERPL-MCNC: 6.1 G/DL (ref 6.1–8.1)
PSA FREE MFR SERPL: 50 % (CALC)
PSA FREE SERPL-MCNC: 0.1 NG/ML
PSA SERPL-MCNC: 0.2 NG/ML
RBC # BLD AUTO: 4.25 MILLION/UL (ref 4.2–5.8)
SODIUM SERPL-SCNC: 138 MMOL/L (ref 135–146)
TRIGL SERPL-MCNC: 89 MG/DL
WBC # BLD AUTO: 4.3 THOUSAND/UL (ref 3.8–10.8)

## 2023-08-25 DIAGNOSIS — J45.20 MILD INTERMITTENT ASTHMA WITHOUT COMPLICATION: ICD-10-CM

## 2023-08-28 RX ORDER — MOMETASONE FUROATE 110 UG/1
1 INHALANT RESPIRATORY (INHALATION) 2 TIMES DAILY
Qty: 3 EACH | Refills: 0 | Status: SHIPPED | OUTPATIENT
Start: 2023-08-28

## 2023-08-30 ENCOUNTER — HOSPITAL ENCOUNTER (OUTPATIENT)
Dept: RADIOLOGY | Facility: HOSPITAL | Age: 78
Setting detail: OUTPATIENT SURGERY
Discharge: HOME | End: 2023-08-30
Payer: COMMERCIAL

## 2023-08-30 ENCOUNTER — HOSPITAL ENCOUNTER (OUTPATIENT)
Facility: AMBULARY SURGERY CENTER | Age: 78
Setting detail: OUTPATIENT SURGERY
Discharge: HOME | End: 2023-08-30
Attending: PHYSICAL MEDICINE & REHABILITATION | Admitting: PHYSICAL MEDICINE & REHABILITATION
Payer: COMMERCIAL

## 2023-08-30 VITALS
DIASTOLIC BLOOD PRESSURE: 82 MMHG | RESPIRATION RATE: 20 BRPM | HEART RATE: 60 BPM | TEMPERATURE: 96.3 F | SYSTOLIC BLOOD PRESSURE: 159 MMHG | OXYGEN SATURATION: 99 %

## 2023-08-30 DIAGNOSIS — Z92.241 S/P EPIDURAL STEROID INJECTION: ICD-10-CM

## 2023-08-30 PROCEDURE — 62323 NJX INTERLAMINAR LMBR/SAC: CPT | Performed by: PHYSICAL MEDICINE & REHABILITATION

## 2023-08-30 RX ORDER — METHYLPREDNISOLONE ACETATE 80 MG/ML
INJECTION, SUSPENSION INTRA-ARTICULAR; INTRALESIONAL; INTRAMUSCULAR; SOFT TISSUE AS NEEDED
Status: DISCONTINUED | OUTPATIENT
Start: 2023-08-30 | End: 2023-08-30 | Stop reason: HOSPADM

## 2023-08-30 RX ORDER — LIDOCAINE HYDROCHLORIDE 10 MG/ML
INJECTION, SOLUTION EPIDURAL; INFILTRATION; INTRACAUDAL; PERINEURAL AS NEEDED
Status: DISCONTINUED | OUTPATIENT
Start: 2023-08-30 | End: 2023-08-30 | Stop reason: HOSPADM

## 2023-08-30 NOTE — DISCHARGE INSTRUCTIONS
Epidural Steroid Injection   WHAT YOU NEED TO KNOW:   An epidural steroid injection (MARVIN) is a procedure to inject steroid medicine into the epidural space. The epidural space is between your spinal cord and vertebrae. Steroids reduce inflammation and fluid buildup in your spine that may be causing pain. You may be given pain medicine along with the steroids. ACTIVITY  Do not drive or operate machinery today. No strenuous activity today - bending, lifting, etc.  You may resume normal activites starting tomorrow - start slowly and as tolerated. You may shower today, but no tub baths or hot tubs. You may have numbness for several hours from the local anesthetic. Please use caution and common sense, especially with weight-bearing activities. CARE OF THE INJECTION SITE  If you have soreness or pain, apply ice to the area today (20 minutes on/20 minutes off). Starting tomorrow, you may use warm, moist heat or ice if needed. You may have an increase or change in your discomfort for 36-48 hours after your treatment. Apply ice and continue with any pain medication you have been prescribed. Notify the [facility] if you have any of the following: redness, drainage, swelling, headache, stiff neck or fever above 100°F.    SPECIAL INSTRUCTIONS  Our office will contact you in approximately 7 days for a progress report. MEDICATIONS  Continue to take all routine medications. Our office may have instructed you to hold some medications. As no general anesthesia was used in today's procedure, you should not experience any side effects related to anesthesia. If you are diabetic, the steroids used in today's injection may temporarily increase your blood sugar levels after the first few days after your injection. Please keep a close eye on your sugars and alert the doctor who manages your diabetes if your sugars are significantly high from your baseline or you are symptomatic.      If you have a problem specifically related to your procedure, please call our office at [phone]. Problems not related to your procedure should be directed to your primary care physician.

## 2023-08-30 NOTE — OP NOTE
OPERATIVE REPORT  PATIENT NAME: Rojas Kovacs    :  1945  MRN: [id number]  Pt Location: [facility] MINOR/PAIN ROOM 01    SURGERY DATE: 2023    Surgeon(s) and Role:     * Paola Arreguin DO - Primary    Preop Diagnosis:  Spinal stenosis of lumbar region without neurogenic claudication [M48.061]  Lumbar radiculopathy [M54.16]  Lumbar back pain [M54.50]  Chronic pain syndrome [G89.4]    Post-Op Diagnosis Codes:     * Spinal stenosis of lumbar region without neurogenic claudication [M48.061]     * Lumbar radiculopathy [M54.16]     * Lumbar back pain [M54.50]     * Chronic pain syndrome [G89.4]    Procedure(s):  L5-S1 LUMBAR epidural steroid injection (25055)    Lumbar epidural  Indication:  Back and radiating leg pain  Preoperative diagnosis:  Lumbar radiculitis  Postoperative diagnosis:  Lumbar radiculitis    Procedure: Fluoroscopically-guided L5-S1 interlaminar epidural steroid injection under fluoroscopy    EBL:  none  Specimens:  not applicable    After discussing the risks, benefits, and alternatives to the procedure, the patient expressed understanding and wished to proceed. The patient was brought to the fluoroscopy suite and placed in the prone position. A procedural pause was conducted to verify:  correct patient identity, procedure to be performed and as applicable, correct side and site, correct patient position, and availability of implants, special equipment and special requirements. After identifying the L5-S1 space fluoroscopically, the skin was sterilely prepped and draped in the usual fashion using Chloraprep skin prep. The skin and subcutaneous tissues were anesthetized with 1% lidocaine. Utilizing a loss of resistance technique and intermittent fluoroscopic guidance, a 3.5 inch 20-gauge Tuohy needle was advanced into the epidural space. Proper needle positioning was confirmed using multiple fluoroscopic views.   After negative aspiration, Omnipaque 240 contrast was injected confirming epidural spread without evidence of intravascular or intrathecal spread. A 4 ml solution consisting of 80 mg Depo-Medrol in sterile saline was injected slowly and incrementally into the epidural space. Following the injection the needle was withdrawn slightly and flushed with 1% buffered lidocaine as it was fully extracted. The patient tolerated the procedure well and there were no apparent complications. After appropriate observation, the patient was dismissed from the clinic in good condition under their own power.           SIGNATURE: Brian Stevenson DO  DATE: August 30, 2023  TIME: 9:30 AM

## 2023-09-01 DIAGNOSIS — J45.20 MILD INTERMITTENT ASTHMA WITHOUT COMPLICATION: ICD-10-CM

## 2023-09-01 RX ORDER — MOMETASONE FUROATE 110 UG/1
1 INHALANT RESPIRATORY (INHALATION) 2 TIMES DAILY
Qty: 3 EACH | Refills: 0 | OUTPATIENT
Start: 2023-09-01

## 2023-09-06 ENCOUNTER — TELEPHONE (OUTPATIENT)
Dept: PAIN MEDICINE | Facility: CLINIC | Age: 78
End: 2023-09-06

## 2023-09-06 NOTE — TELEPHONE ENCOUNTER
Pt reports 50% improvement post inj   Pain level 3/10  Pt aware I will call next week for an update

## 2023-09-07 ENCOUNTER — OFFICE VISIT (OUTPATIENT)
Dept: FAMILY MEDICINE CLINIC | Facility: CLINIC | Age: 78
End: 2023-09-07
Payer: COMMERCIAL

## 2023-09-07 VITALS
RESPIRATION RATE: 14 BRPM | HEART RATE: 68 BPM | BODY MASS INDEX: 25.45 KG/M2 | TEMPERATURE: 95.2 F | HEIGHT: 73 IN | DIASTOLIC BLOOD PRESSURE: 70 MMHG | OXYGEN SATURATION: 98 % | SYSTOLIC BLOOD PRESSURE: 130 MMHG | WEIGHT: 192 LBS

## 2023-09-07 DIAGNOSIS — Z12.11 COLON CANCER SCREENING: ICD-10-CM

## 2023-09-07 DIAGNOSIS — E78.2 MIXED HYPERLIPIDEMIA: ICD-10-CM

## 2023-09-07 DIAGNOSIS — J45.20 MILD INTERMITTENT ASTHMA WITHOUT COMPLICATION: ICD-10-CM

## 2023-09-07 DIAGNOSIS — M15.9 GENERALIZED OSTEOARTHRITIS OF MULTIPLE SITES: ICD-10-CM

## 2023-09-07 DIAGNOSIS — Z12.5 ENCOUNTER FOR PROSTATE CANCER SCREENING: ICD-10-CM

## 2023-09-07 DIAGNOSIS — Z13.29 SCREENING FOR HYPOTHYROIDISM: ICD-10-CM

## 2023-09-07 DIAGNOSIS — Z00.00 MEDICARE ANNUAL WELLNESS VISIT, SUBSEQUENT: ICD-10-CM

## 2023-09-07 DIAGNOSIS — G47.9 DISTURBANCE OF SLEEP: ICD-10-CM

## 2023-09-07 DIAGNOSIS — E87.5 SERUM POTASSIUM ELEVATED: ICD-10-CM

## 2023-09-07 DIAGNOSIS — M48.062 SPINAL STENOSIS OF LUMBAR REGION WITH NEUROGENIC CLAUDICATION: ICD-10-CM

## 2023-09-07 DIAGNOSIS — I10 PRIMARY HYPERTENSION: Primary | ICD-10-CM

## 2023-09-07 LAB — SL AMB POCT FECES OCC BLD: NORMAL

## 2023-09-07 PROCEDURE — 99215 OFFICE O/P EST HI 40 MIN: CPT | Performed by: FAMILY MEDICINE

## 2023-09-07 PROCEDURE — G0439 PPPS, SUBSEQ VISIT: HCPCS | Performed by: FAMILY MEDICINE

## 2023-09-07 PROCEDURE — 82270 OCCULT BLOOD FECES: CPT | Performed by: FAMILY MEDICINE

## 2023-09-07 PROCEDURE — 93000 ELECTROCARDIOGRAM COMPLETE: CPT | Performed by: FAMILY MEDICINE

## 2023-09-07 NOTE — PATIENT INSTRUCTIONS
DISCUSSED HEALTH MAINTENENCE ISSUES  BW WILL BE REVIEWED  ENCOURAGED HEALTHY DIET AND EXERCISE  COLONOSCOPY WILL BE ORDERED  RV FOR ANNUAL HEALTH EXAM IN 1 YEAR  RV SOONER IF THERE ARE ANY CONCERNS      RV 6M    Recent Results (from the past 672 hour(s))   Lipid panel    Collection Time: 08/11/23  9:12 AM   Result Value Ref Range    Total Cholesterol 103 <200 mg/dL    HDL 50 > OR = 40 mg/dL    Triglycerides 89 <150 mg/dL    LDL Calculated 36 mg/dL (calc)    Chol HDLC Ratio 2.1 <5.0 (calc)    Non-HDL Cholesterol 53 <130 mg/dL (calc)   Comprehensive metabolic panel    Collection Time: 08/11/23  9:12 AM   Result Value Ref Range    Glucose, Random 86 65 - 99 mg/dL    BUN 7 7 - 25 mg/dL    Creatinine 0.97 0.70 - 1.28 mg/dL    eGFR 81 > OR = 60 mL/min/1.73m2    SL AMB BUN/CREATININE RATIO SEE NOTE: 6 - 22 (calc)    Sodium 138 135 - 146 mmol/L    Potassium 5.7 (H) 3.5 - 5.3 mmol/L    Chloride 99 98 - 110 mmol/L    CO2 34 (H) 20 - 32 mmol/L    Calcium 9.5 8.6 - 10.3 mg/dL    Protein, Total 6.1 6.1 - 8.1 g/dL    Albumin 4.2 3.6 - 5.1 g/dL    Globulin 1.9 1.9 - 3.7 g/dL (calc)    Albumin/Globulin Ratio 2.2 1.0 - 2.5 (calc)    TOTAL BILIRUBIN 0.5 0.2 - 1.2 mg/dL    Alkaline Phosphatase 63 35 - 144 U/L    AST 18 10 - 35 U/L    ALT 14 9 - 46 U/L   CBC and differential    Collection Time: 08/11/23  9:12 AM   Result Value Ref Range    White Blood Cell Count 4.3 3.8 - 10.8 Thousand/uL    Red Blood Cell Count 4.25 4.20 - 5.80 Million/uL    Hemoglobin 13.3 13.2 - 17.1 g/dL    HCT 39.7 38.5 - 50.0 %    MCV 93.4 80.0 - 100.0 fL    MCH 31.3 27.0 - 33.0 pg    MCHC 33.5 32.0 - 36.0 g/dL    RDW 12.3 11.0 - 15.0 %    Platelet Count 472 218 - 400 Thousand/uL    SL AMB MPV 10.4 7.5 - 12.5 fL    Neutrophils (Absolute) 2,283 1,500 - 7,800 cells/uL    Lymphocytes (Absolute) 1,488 850 - 3,900 cells/uL    Monocytes (Absolute) 417 200 - 950 cells/uL    Eosinophils (Absolute) 90 15 - 500 cells/uL    Basophils ABS 22 0 - 200 cells/uL    Neutrophils 53.1 %    Lymphocytes 34.6 %    Monocytes 9.7 %    Eosinophils 2.1 %    Basophils PCT 0.5 %   PSA, total and free    Collection Time: 08/11/23  9:12 AM   Result Value Ref Range    Prostate Specific Antigen Total 0.2 < OR = 4.0 ng/mL    PSA, Free 0.1 ng/mL    PSA, Free Pct 50 >25 % (calc)       Medicare Preventive Visit Patient Instructions  Thank you for completing your Welcome to Medicare Visit or Medicare Annual Wellness Visit today. Your next wellness visit will be due in one year (9/7/2024). The screening/preventive services that you may require over the next 5-10 years are detailed below. Some tests may not apply to you based off risk factors and/or age. Screening tests ordered at today's visit but not completed yet may show as past due. Also, please note that scanned in results may not display below. Preventive Screenings:  Service Recommendations Previous Testing/Comments   Colorectal Cancer Screening  · Colonoscopy    · Fecal Occult Blood Test (FOBT)/Fecal Immunochemical Test (FIT)  · Fecal DNA/Cologuard Test  · Flexible Sigmoidoscopy Age: 43-73 years old   Colonoscopy: every 10 years (May be performed more frequently if at higher risk)  OR  FOBT/FIT: every 1 year  OR  Cologuard: every 3 years  OR  Sigmoidoscopy: every 5 years  Screening may be recommended earlier than age 39 if at higher risk for colorectal cancer. Also, an individualized decision between you and your healthcare provider will decide whether screening between the ages of 77-80 would be appropriate.  Colonoscopy: 01/02/2020  FOBT/FIT: Not on file  Cologuard: Not on file  Sigmoidoscopy: Not on file          Prostate Cancer Screening Individualized decision between patient and health care provider in men between ages of 53-66   Medicare will cover every 12 months beginning on the day after your 50th birthday PSA: 0.2 ng/mL     Screening Not Indicated     Hepatitis C Screening Once for adults born between 1945 and 1965  More frequently in patients at high risk for Hepatitis C Hep C Antibody: 08/16/2021    Screening Current   Diabetes Screening 1-2 times per year if you're at risk for diabetes or have pre-diabetes Fasting glucose: No results in last 5 years (No results in last 5 years)  A1C: No results in last 5 years (No results in last 5 years)  Screening Current   Cholesterol Screening Once every 5 years if you don't have a lipid disorder. May order more often based on risk factors. Lipid panel: 08/11/2023  Screening Not Indicated  History Lipid Disorder      Other Preventive Screenings Covered by Medicare:  1. Abdominal Aortic Aneurysm (AAA) Screening: covered once if your at risk. You're considered to be at risk if you have a family history of AAA or a male between the age of 70-76 who smoking at least 100 cigarettes in your lifetime. 2. Lung Cancer Screening: covers low dose CT scan once per year if you meet all of the following conditions: (1) Age 48-67; (2) No signs or symptoms of lung cancer; (3) Current smoker or have quit smoking within the last 15 years; (4) You have a tobacco smoking history of at least 20 pack years (packs per day x number of years you smoked); (5) You get a written order from a healthcare provider. 3. Glaucoma Screening: covered annually if you're considered high risk: (1) You have diabetes OR (2) Family history of glaucoma OR (3)  aged 48 and older OR (3)  American aged 72 and older  3. Osteoporosis Screening: covered every 2 years if you meet one of the following conditions: (1) Have a vertebral abnormality; (2) On glucocorticoid therapy for more than 3 months; (3) Have primary hyperparathyroidism; (4) On osteoporosis medications and need to assess response to drug therapy. 5. HIV Screening: covered annually if you're between the age of 14-79. Also covered annually if you are younger than 13 and older than 72 with risk factors for HIV infection.  For pregnant patients, it is covered up to 3 times per pregnancy. Immunizations:  Immunization Recommendations   Influenza Vaccine Annual influenza vaccination during flu season is recommended for all persons aged >= 6 months who do not have contraindications   Pneumococcal Vaccine   * Pneumococcal conjugate vaccine = PCV13 (Prevnar 13), PCV15 (Vaxneuvance), PCV20 (Prevnar 20)  * Pneumococcal polysaccharide vaccine = PPSV23 (Pneumovax) Adults 20-63 years old: 1-3 doses may be recommended based on certain risk factors  Adults 72 years old: 1-2 doses may be recommended based off what pneumonia vaccine you previously received   Hepatitis B Vaccine 3 dose series if at intermediate or high risk (ex: diabetes, end stage renal disease, liver disease)   Tetanus (Td) Vaccine - COST NOT COVERED BY MEDICARE PART B Following completion of primary series, a booster dose should be given every 10 years to maintain immunity against tetanus. Td may also be given as tetanus wound prophylaxis. Tdap Vaccine - COST NOT COVERED BY MEDICARE PART B Recommended at least once for all adults. For pregnant patients, recommended with each pregnancy. Shingles Vaccine (Shingrix) - COST NOT COVERED BY MEDICARE PART B  2 shot series recommended in those aged 48 and above     Health Maintenance Due:      Topic Date Due   • Hepatitis C Screening  Completed   • Colorectal Cancer Screening  Discontinued     Immunizations Due:      Topic Date Due   • Pneumococcal Vaccine: 65+ Years (2 - PPSV23 if available, else PCV20) 10/20/2016   • COVID-19 Vaccine (4 - Moderna series) 12/21/2021   • Influenza Vaccine (1) 09/01/2023     Advance Directives   What are advance directives? Advance directives are legal documents that state your wishes and plans for medical care. These plans are made ahead of time in case you lose your ability to make decisions for yourself. Advance directives can apply to any medical decision, such as the treatments you want, and if you want to donate organs.    What are the types of advance directives? There are many types of advance directives, and each state has rules about how to use them. You may choose a combination of any of the following:  · Living will: This is a written record of the treatment you want. You can also choose which treatments you do not want, which to limit, and which to stop at a certain time. This includes surgery, medicine, IV fluid, and tube feedings. · Durable power of  for [facility]): This is a written record that states who you want to make healthcare choices for you when you are unable to make them for yourself. This person, called a proxy, is usually a family member or a friend. You may choose more than 1 proxy. · Do not resuscitate (DNR) order:  A DNR order is used in case your heart stops beating or you stop breathing. It is a request not to have certain forms of treatment, such as CPR. A DNR order may be included in other types of advance directives. · Medical directive: This covers the care that you want if you are in a coma, near death, or unable to make decisions for yourself. You can list the treatments you want for each condition. Treatment may include pain medicine, surgery, blood transfusions, dialysis, IV or tube feedings, and a ventilator (breathing machine). · Values history: This document has questions about your views, beliefs, and how you feel and think about life. This information can help others choose the care that you would choose. Why are advance directives important? An advance directive helps you control your care. Although spoken wishes may be used, it is better to have your wishes written down. Spoken wishes can be misunderstood, or not followed. Treatments may be given even if you do not want them. An advance directive may make it easier for your family to make difficult choices about your care.    Weight Management   Why it is important to manage your weight:  Being overweight increases your risk of health conditions such as heart disease, high blood pressure, type 2 diabetes, and certain types of cancer. It can also increase your risk for osteoarthritis, sleep apnea, and other respiratory problems. Aim for a slow, steady weight loss. Even a small amount of weight loss can lower your risk of health problems. How to lose weight safely:  A safe and healthy way to lose weight is to eat fewer calories and get regular exercise. You can lose up about 1 pound a week by decreasing the number of calories you eat by 500 calories each day. Healthy meal plan for weight management:  A healthy meal plan includes a variety of foods, contains fewer calories, and helps you stay healthy. A healthy meal plan includes the following:  · Eat whole-grain foods more often. A healthy meal plan should contain fiber. Fiber is the part of grains, fruits, and vegetables that is not broken down by your body. Whole-grain foods are healthy and provide extra fiber in your diet. Some examples of whole-grain foods are whole-wheat breads and pastas, oatmeal, brown rice, and bulgur. · Eat a variety of vegetables every day. Include dark, leafy greens such as spinach, kale, gabriella greens, and mustard greens. Eat yellow and orange vegetables such as carrots, sweet potatoes, and winter squash. · Eat a variety of fruits every day. Choose fresh or canned fruit (canned in its own juice or light syrup) instead of juice. Fruit juice has very little or no fiber. · Eat low-fat dairy foods. Drink fat-free (skim) milk or 1% milk. Eat fat-free yogurt and low-fat cottage cheese. Try low-fat cheeses such as mozzarella and other reduced-fat cheeses. · Choose meat and other protein foods that are low in fat. Choose beans or other legumes such as split peas or lentils. Choose fish, skinless poultry (chicken or turkey), or lean cuts of red meat (beef or pork). Before you cook meat or poultry, cut off any visible fat. · Use less fat and oil.   Try baking foods instead of frying them. Add less fat, such as margarine, sour cream, regular salad dressing and mayonnaise to foods. Eat fewer high-fat foods. Some examples of high-fat foods include french fries, doughnuts, ice cream, and cakes. · Eat fewer sweets. Limit foods and drinks that are high in sugar. This includes candy, cookies, regular soda, and sweetened drinks. Exercise:  Exercise at least 30 minutes per day on most days of the week. Some examples of exercise include walking, biking, dancing, and swimming. You can also fit in more physical activity by taking the stairs instead of the elevator or parking farther away from stores. Ask your healthcare provider about the best exercise plan for you. © Copyright [employer] 2018 Information is for End User's use only and may not be sold, redistributed or otherwise used for commercial purposes.  All illustrations and images included in CareNotes® are the copyrighted property of A.D.A.M., Inc. or  [address]

## 2023-09-07 NOTE — LETTER
JOHANNE SHIRLEY      Current Outpatient Medications:     ALPRAZolam (XANAX) 0.5 mg tablet, Take 1 tablet (0.5 mg total) by mouth daily at bedtime as needed for anxiety, Disp: 30 tablet, Rfl: 0    aspirin (ECOTRIN LOW STRENGTH) 81 mg EC tablet, Take 81 mg by mouth daily, Disp: , Rfl:     colchicine (COLCRYS) 0.6 mg tablet, Take 1 tablet (0.6 mg total) by mouth 2 (two) times a day for 5 days, Disp: 10 tablet, Rfl: 1    finasteride (PROSCAR) 5 mg tablet, Take 5 mg by mouth daily, Disp: , Rfl:     Mirabegron ER 50 MG TB24, Take 1 tablet (50 mg total) by mouth in the morning, Disp: 90 tablet, Rfl: 3    mometasone (Asmanex, 30 Metered Doses,) 110 mcg/actuation AEPB inhaler, Inhale 1 puff 2 (two) times a day Rinse mouth after use, Disp: 3 each, Rfl: 0    multivitamin (THERAGRAN) TABS, Take 1 tablet by mouth daily, Disp: , Rfl:     nabumetone (RELAFEN) 750 mg tablet, Take 1 tablet (750 mg total) by mouth 2 (two) times a day, Disp: 180 tablet, Rfl: 1    Omega-3 Fatty Acids (FISH OIL PO), Take by mouth in the morning, Disp: , Rfl:     Probiotic Product (PROBIOTIC PO), Take by mouth, Disp: , Rfl:     rosuvastatin (CRESTOR) 20 MG tablet, Take 1 tablet (20 mg total) by mouth daily, Disp: 90 tablet, Rfl: 3    traZODone (DESYREL) 100 mg tablet, Take 2 tablets (200 mg total) by mouth daily at bedtime, Disp: 60 tablet, Rfl: 3    zolpidem (AMBIEN) 10 mg tablet, Take 1 tablet (10 mg total) by mouth daily at bedtime as needed for sleep, Disp: 30 tablet, Rfl: 0    Recent Results (from the past 672 hour(s))   Lipid panel    Collection Time: 08/11/23  9:12 AM   Result Value Ref Range    Total Cholesterol 103 <200 mg/dL    HDL 50 > OR = 40 mg/dL    Triglycerides 89 <150 mg/dL    LDL Calculated 36 mg/dL (calc)    Chol HDLC Ratio 2.1 <5.0 (calc)    Non-HDL Cholesterol 53 <130 mg/dL (calc)   Comprehensive metabolic panel    Collection Time: 08/11/23  9:12 AM   Result Value Ref Range    Glucose, Random 86 65 - 99 mg/dL    BUN 7 7 - 25 mg/dL Creatinine 0.97 0.70 - 1.28 mg/dL    eGFR 81 > OR = 60 mL/min/1.73m2    SL AMB BUN/CREATININE RATIO SEE NOTE: 6 - 22 (calc)    Sodium 138 135 - 146 mmol/L    Potassium 5.7 (H) 3.5 - 5.3 mmol/L    Chloride 99 98 - 110 mmol/L    CO2 34 (H) 20 - 32 mmol/L    Calcium 9.5 8.6 - 10.3 mg/dL    Protein, Total 6.1 6.1 - 8.1 g/dL    Albumin 4.2 3.6 - 5.1 g/dL    Globulin 1.9 1.9 - 3.7 g/dL (calc)    Albumin/Globulin Ratio 2.2 1.0 - 2.5 (calc)    TOTAL BILIRUBIN 0.5 0.2 - 1.2 mg/dL    Alkaline Phosphatase 63 35 - 144 U/L    AST 18 10 - 35 U/L    ALT 14 9 - 46 U/L   CBC and differential    Collection Time: 08/11/23  9:12 AM   Result Value Ref Range    White Blood Cell Count 4.3 3.8 - 10.8 Thousand/uL    Red Blood Cell Count 4.25 4.20 - 5.80 Million/uL    Hemoglobin 13.3 13.2 - 17.1 g/dL    HCT 39.7 38.5 - 50.0 %    MCV 93.4 80.0 - 100.0 fL    MCH 31.3 27.0 - 33.0 pg    MCHC 33.5 32.0 - 36.0 g/dL    RDW 12.3 11.0 - 15.0 %    Platelet Count 409 164 - 400 Thousand/uL    SL AMB MPV 10.4 7.5 - 12.5 fL    Neutrophils (Absolute) 2,283 1,500 - 7,800 cells/uL    Lymphocytes (Absolute) 1,488 850 - 3,900 cells/uL    Monocytes (Absolute) 417 200 - 950 cells/uL    Eosinophils (Absolute) 90 15 - 500 cells/uL    Basophils ABS 22 0 - 200 cells/uL    Neutrophils 53.1 %    Lymphocytes 34.6 %    Monocytes 9.7 %    Eosinophils 2.1 %    Basophils PCT 0.5 %   PSA, total and free    Collection Time: 08/11/23  9:12 AM   Result Value Ref Range    Prostate Specific Antigen Total 0.2 < OR = 4.0 ng/mL    PSA, Free 0.1 ng/mL    PSA, Free Pct 50 >25 % (calc)

## 2023-09-11 DIAGNOSIS — G47.9 DISTURBANCE OF SLEEP: ICD-10-CM

## 2023-09-11 DIAGNOSIS — R35.0 URINARY FREQUENCY: ICD-10-CM

## 2023-09-11 NOTE — TELEPHONE ENCOUNTER
Left voicemail  I'm hi, I'm calling for a refill. My name is Jayme Sadler Rere MONTGOMERYRI want to get a refill on Ambien 10 milligram at [facility]. My telephone number is [phone].  Nate Carreon calling for refill of Ambien 10 milligram [facility], [phone] and I thank you holden Ramírez.

## 2023-09-12 DIAGNOSIS — G47.9 DISTURBANCE OF SLEEP: ICD-10-CM

## 2023-09-12 RX ORDER — TRAZODONE HYDROCHLORIDE 100 MG/1
200 TABLET ORAL
Qty: 60 TABLET | Refills: 3 | OUTPATIENT
Start: 2023-09-12

## 2023-09-12 RX ORDER — ZOLPIDEM TARTRATE 10 MG/1
10 TABLET ORAL
Qty: 30 TABLET | Refills: 0 | OUTPATIENT
Start: 2023-09-12

## 2023-09-12 RX ORDER — TRAZODONE HYDROCHLORIDE 100 MG/1
200 TABLET ORAL
Qty: 60 TABLET | Refills: 2 | Status: SHIPPED | OUTPATIENT
Start: 2023-09-12

## 2023-09-12 RX ORDER — ZOLPIDEM TARTRATE 10 MG/1
10 TABLET ORAL
Qty: 30 TABLET | Refills: 0 | Status: SHIPPED | OUTPATIENT
Start: 2023-09-12

## 2023-09-28 ENCOUNTER — TELEPHONE (OUTPATIENT)
Age: 78
End: 2023-09-28

## 2023-09-28 ENCOUNTER — TELEPHONE (OUTPATIENT)
Dept: FAMILY MEDICINE CLINIC | Facility: CLINIC | Age: 78
End: 2023-09-28

## 2023-09-28 DIAGNOSIS — J45.20 MILD INTERMITTENT ASTHMA WITHOUT COMPLICATION: ICD-10-CM

## 2023-09-28 RX ORDER — MOMETASONE FUROATE 110 UG/1
1 INHALANT RESPIRATORY (INHALATION) 2 TIMES DAILY
Qty: 3 EACH | Refills: 0 | Status: SHIPPED | OUTPATIENT
Start: 2023-09-28 | End: 2023-09-29

## 2023-09-28 NOTE — TELEPHONE ENCOUNTER
Reason for call:   [x] Refill   [] Prior Auth  [] Other:     Office:   [x] PCP/Provider - Yoshi  [] Speciality/Provider -     Medication: mometasone inhaler    Dose/Frequency: 110mcg 1 puff BID    Quantity: 3    Pharmacy: [address] [place] [address]    Does the patient have enough for 3 days?    [x] Yes   [] No - Send as HP to POD

## 2023-09-28 NOTE — TELEPHONE ENCOUNTER
Pharmacist called and stated insurance will now only cover 220 - 1 puff a day    Can you send in new rx.     [facility]

## 2023-09-28 NOTE — TELEPHONE ENCOUNTER
12/21/21 0845   Vital Signs   Temp 97.8 °F (36.6 °C)   Temp Source Oral   Pulse 85   Heart Rate Source Monitor   Resp 16   BP (!) 154/78   BP Location Right upper arm   Patient Position Semi fowlers   Level of Consciousness Alert (0)   MEWS Score 1   Patient Currently in Pain No   Pain Assessment   Pain Assessment 0-10   Pain Level 0   Oxygen Therapy   SpO2 97 %   Pulse Oximeter Device Mode Intermittent   Pulse Oximeter Device Location Finger   O2 Device None (Room air)   Am assessment complete-see flowsheet. AM meds-MAR. A/O4. Heated up coffee, fresh ice water and cleaned up table. Call light and table within reach. Bed locked lowest position. No further needs at this time. Sorry about that looks like it was sent by AG. I think the pods sent it.

## 2023-09-28 NOTE — TELEPHONE ENCOUNTER
Received call from Lady Lima at [facility] regarding prior authorization for mometasone (Asmanex, 30 Metered Doses,) 110 mcg/actuation AEPB inhaler     Clarification on dosage for order - RN read 1 puff BID for order. 2.  Pharmacist questioned if patient is using 1 puffs BID, can patient used 220 mcg, 60-dose inhaler instead of 110 mc 30-dose inhaler? [facility] is sending fax to office for this information. Fax #  [phone] provided. If there is a correction to fax #, please follow up with Lady Lima.

## 2023-09-28 NOTE — TELEPHONE ENCOUNTER
Dr. Maru Magana    Please see the bottom note    Pharmacist called and stated that [facility] insurance will only cover 220 1 puff a day    [address] [facility]

## 2023-09-29 DIAGNOSIS — J45.20 MILD INTERMITTENT ASTHMA WITHOUT COMPLICATION: Primary | ICD-10-CM

## 2023-09-29 RX ORDER — MOMETASONE FUROATE 220 UG/1
1 INHALANT RESPIRATORY (INHALATION) EVERY EVENING
Qty: 1 EACH | Refills: 3 | Status: SHIPPED | OUTPATIENT
Start: 2023-09-29

## 2023-10-10 DIAGNOSIS — G47.9 DISTURBANCE OF SLEEP: ICD-10-CM

## 2023-10-10 RX ORDER — ALPRAZOLAM 0.5 MG/1
0.5 TABLET ORAL
Qty: 30 TABLET | Refills: 0 | Status: SHIPPED | OUTPATIENT
Start: 2023-10-10

## 2023-10-10 RX ORDER — ZOLPIDEM TARTRATE 10 MG/1
10 TABLET ORAL
Qty: 30 TABLET | Refills: 0 | Status: SHIPPED | OUTPATIENT
Start: 2023-10-10

## 2023-10-10 NOTE — TELEPHONE ENCOUNTER
Medication:  PDMP Unable to complete [place] PDMP. Will forward to clinical staff to review.      Active agreement on file

## 2023-10-10 NOTE — TELEPHONE ENCOUNTER
Reason for call:   [x] Refill   [] Prior Auth  [] Other:     Office:   [x] PCP/Provider -   [] Speciality/Provider - Yoshi    Medication: Alprazolam 0.5mg 1 tab bedtime                     Zolpidem 10mg 1 tab bedtime      Quantity: 30    Pharmacy: [facility]    Does the patient have enough for 3 days?    [x] Yes   [] No - Send as HP to POD

## 2023-11-02 ENCOUNTER — TELEPHONE (OUTPATIENT)
Age: 78
End: 2023-11-02

## 2023-11-02 DIAGNOSIS — G47.9 DISTURBANCE OF SLEEP: ICD-10-CM

## 2023-11-02 NOTE — TELEPHONE ENCOUNTER
Patient called asking for a refill  on Colchicine 0.6mg twice daily, asked if he could get a larger quantity then 10 this time around. If approved please send into [address] [facility] [address].

## 2023-11-03 DIAGNOSIS — M10.9 GOUTY ARTHROPATHY: Primary | ICD-10-CM

## 2023-11-03 RX ORDER — COLCHICINE 0.6 MG/1
0.6 TABLET ORAL 2 TIMES DAILY
Qty: 60 TABLET | Refills: 5 | Status: SHIPPED | OUTPATIENT
Start: 2023-11-03

## 2023-11-08 ENCOUNTER — TELEPHONE (OUTPATIENT)
Dept: OTHER | Facility: HOSPITAL | Age: 78
End: 2023-11-08

## 2023-11-08 DIAGNOSIS — G47.9 DISTURBANCE OF SLEEP: ICD-10-CM

## 2023-11-08 DIAGNOSIS — R21 RASH: Primary | ICD-10-CM

## 2023-11-08 RX ORDER — ALPRAZOLAM 0.5 MG/1
0.5 TABLET ORAL
Qty: 30 TABLET | Refills: 0 | Status: SHIPPED | OUTPATIENT
Start: 2023-11-08

## 2023-11-08 RX ORDER — ZOLPIDEM TARTRATE 10 MG/1
10 TABLET ORAL
Qty: 30 TABLET | Refills: 0 | Status: SHIPPED | OUTPATIENT
Start: 2023-11-08

## 2023-11-08 RX ORDER — TRIAMCINOLONE ACETONIDE 1 MG/G
OINTMENT TOPICAL 2 TIMES DAILY
Qty: 80 G | Refills: 2 | Status: SHIPPED | OUTPATIENT
Start: 2023-11-08

## 2023-11-08 NOTE — TELEPHONE ENCOUNTER
Reason for call:   [x] Refill   [] Prior Auth  [] Other:     Office:   [x] PCP/Provider -   [] Specialty/Provider -     Medication: xanax, ambien    Dose/Frequency: 0.5 mg, 10 mg/ daily PRN     Quantity: 30 day supply     Pharmacy: [address] [place] [address]     Does the patient have enough for 3 days?    [] Yes   [x] No - Send as HP to POD

## 2023-11-08 NOTE — TELEPHONE ENCOUNTER
Patient called medication refill line for a refill on triamcinolone (KENALOG) 0.1 % ointment, medication shows as discontinued in the patients chart.     Please review and advise

## 2023-12-11 DIAGNOSIS — G47.9 DISTURBANCE OF SLEEP: ICD-10-CM

## 2023-12-11 RX ORDER — ZOLPIDEM TARTRATE 10 MG/1
10 TABLET ORAL
Qty: 30 TABLET | Refills: 0 | Status: SHIPPED | OUTPATIENT
Start: 2023-12-11

## 2024-01-03 DIAGNOSIS — G47.9 DISTURBANCE OF SLEEP: ICD-10-CM

## 2024-01-04 DIAGNOSIS — R35.0 URINARY FREQUENCY: ICD-10-CM

## 2024-01-04 RX ORDER — TRAZODONE HYDROCHLORIDE 100 MG/1
200 TABLET ORAL
Qty: 60 TABLET | Refills: 0 | Status: SHIPPED | OUTPATIENT
Start: 2024-01-04

## 2024-01-04 NOTE — TELEPHONE ENCOUNTER
Requested medication(s) are due for refill today: Yes  Patient has already received a courtesy refill: No  Other reason request has been forwarded to provider: This refill cannot be delegated    No

## 2024-01-05 RX ORDER — ZOLPIDEM TARTRATE 10 MG/1
10 TABLET ORAL
Qty: 30 TABLET | Refills: 0 | Status: SHIPPED | OUTPATIENT
Start: 2024-01-05

## 2024-01-15 DIAGNOSIS — G47.9 DISTURBANCE OF SLEEP: ICD-10-CM

## 2024-01-15 RX ORDER — ALPRAZOLAM 0.5 MG/1
0.5 TABLET ORAL
Qty: 30 TABLET | Refills: 0 | Status: SHIPPED | OUTPATIENT
Start: 2024-01-15

## 2024-01-15 NOTE — TELEPHONE ENCOUNTER
Reason for call:   [x] Refill   [] Prior Auth  [] Other:     Office:   [x] PCP/Provider -   [] Specialty/Provider -     Medication: Xanax    Dose/Frequency: 0.5 mg    Quantity: #30    Pharmacy: [facility]    Does the patient have enough for 3 days?   [] Yes   [x] No - Send as HP to POD

## 2024-01-29 DIAGNOSIS — M15.9 GENERALIZED OSTEOARTHRITIS OF MULTIPLE SITES: ICD-10-CM

## 2024-01-29 RX ORDER — NABUMETONE 750 MG/1
750 TABLET, FILM COATED ORAL 2 TIMES DAILY
Qty: 180 TABLET | Refills: 1 | Status: SHIPPED | OUTPATIENT
Start: 2024-01-29

## 2024-01-29 NOTE — TELEPHONE ENCOUNTER
Reason for call:   [x] Refill   [] Prior Auth  [] Other:     Office:   [x] PCP/Provider - Dr Cook  [] Specialty/Provider -     Medication:   Nabumetone 750 mg, 1 bid, 180    Pharmacy: [facility]    Does the patient have enough for 3 days?   [] Yes   [x] No - Send as HP to POD

## 2024-01-31 DIAGNOSIS — I10 PRIMARY HYPERTENSION: Primary | ICD-10-CM

## 2024-01-31 DIAGNOSIS — E78.2 MIXED HYPERLIPIDEMIA: ICD-10-CM

## 2024-02-07 DIAGNOSIS — G47.9 DISTURBANCE OF SLEEP: ICD-10-CM

## 2024-02-08 RX ORDER — ZOLPIDEM TARTRATE 10 MG/1
10 TABLET ORAL
Qty: 30 TABLET | Refills: 0 | Status: SHIPPED | OUTPATIENT
Start: 2024-02-08

## 2024-02-10 DIAGNOSIS — G47.9 DISTURBANCE OF SLEEP: ICD-10-CM

## 2024-02-11 RX ORDER — TRAZODONE HYDROCHLORIDE 100 MG/1
200 TABLET ORAL
Qty: 60 TABLET | Refills: 0 | Status: SHIPPED | OUTPATIENT
Start: 2024-02-11

## 2024-02-20 LAB
ALBUMIN SERPL-MCNC: 4.3 G/DL (ref 3.6–5.1)
ALBUMIN/GLOB SERPL: 2.3 (CALC) (ref 1–2.5)
ALP SERPL-CCNC: 66 U/L (ref 35–144)
ALT SERPL-CCNC: 16 U/L (ref 9–46)
AST SERPL-CCNC: 25 U/L (ref 10–35)
BILIRUB SERPL-MCNC: 0.6 MG/DL (ref 0.2–1.2)
BUN SERPL-MCNC: 14 MG/DL (ref 7–25)
BUN/CREAT SERPL: ABNORMAL (CALC) (ref 6–22)
CALCIUM SERPL-MCNC: 9.7 MG/DL (ref 8.6–10.3)
CHLORIDE SERPL-SCNC: 100 MMOL/L (ref 98–110)
CHOLEST SERPL-MCNC: 119 MG/DL
CHOLEST/HDLC SERPL: 2.1 (CALC)
CO2 SERPL-SCNC: 34 MMOL/L (ref 20–32)
CREAT SERPL-MCNC: 0.99 MG/DL (ref 0.7–1.28)
GFR/BSA.PRED SERPLBLD CYS-BASED-ARV: 78 ML/MIN/1.73M2
GLOBULIN SER CALC-MCNC: 1.9 G/DL (CALC) (ref 1.9–3.7)
GLUCOSE SERPL-MCNC: 87 MG/DL (ref 65–99)
HDLC SERPL-MCNC: 56 MG/DL
LDLC SERPL CALC-MCNC: 46 MG/DL (CALC)
NONHDLC SERPL-MCNC: 63 MG/DL (CALC)
POTASSIUM SERPL-SCNC: 5.4 MMOL/L (ref 3.5–5.3)
PROT SERPL-MCNC: 6.2 G/DL (ref 6.1–8.1)
SODIUM SERPL-SCNC: 140 MMOL/L (ref 135–146)
TRIGL SERPL-MCNC: 86 MG/DL

## 2024-02-21 PROBLEM — Z12.11 COLON CANCER SCREENING: Status: RESOLVED | Noted: 2019-06-21 | Resolved: 2024-02-21

## 2024-02-21 PROBLEM — Z13.29 SCREENING FOR HYPOTHYROIDISM: Status: RESOLVED | Noted: 2019-06-21 | Resolved: 2024-02-21

## 2024-02-21 PROBLEM — Z00.00 MEDICARE ANNUAL WELLNESS VISIT, SUBSEQUENT: Status: RESOLVED | Noted: 2019-06-21 | Resolved: 2024-02-21

## 2024-02-21 PROBLEM — Z12.5 ENCOUNTER FOR PROSTATE CANCER SCREENING: Status: RESOLVED | Noted: 2019-06-21 | Resolved: 2024-02-21

## 2024-02-27 DIAGNOSIS — G47.9 DISTURBANCE OF SLEEP: ICD-10-CM

## 2024-02-27 RX ORDER — ALPRAZOLAM 0.5 MG/1
0.5 TABLET ORAL
Qty: 30 TABLET | Refills: 0 | Status: SHIPPED | OUTPATIENT
Start: 2024-02-27

## 2024-03-06 ENCOUNTER — TELEPHONE (OUTPATIENT)
Dept: ADMINISTRATIVE | Facility: OTHER | Age: 79
End: 2024-03-06

## 2024-03-06 NOTE — TELEPHONE ENCOUNTER
03/06/24 1:26 PM    Patient contacted (spoke with patient) to bring Advance Directive, POLST, or Living Will document to next scheduled pcp visit.    Thank you.  Argelia Awad MA  [facility]

## 2024-03-07 ENCOUNTER — OFFICE VISIT (OUTPATIENT)
Dept: FAMILY MEDICINE CLINIC | Facility: CLINIC | Age: 79
End: 2024-03-07
Payer: COMMERCIAL

## 2024-03-07 VITALS
RESPIRATION RATE: 18 BRPM | BODY MASS INDEX: 25.6 KG/M2 | WEIGHT: 194 LBS | HEART RATE: 66 BPM | OXYGEN SATURATION: 98 % | TEMPERATURE: 97.4 F | DIASTOLIC BLOOD PRESSURE: 62 MMHG | SYSTOLIC BLOOD PRESSURE: 130 MMHG

## 2024-03-07 DIAGNOSIS — R35.0 URINARY FREQUENCY: ICD-10-CM

## 2024-03-07 DIAGNOSIS — M48.062 SPINAL STENOSIS OF LUMBAR REGION WITH NEUROGENIC CLAUDICATION: ICD-10-CM

## 2024-03-07 DIAGNOSIS — M15.9 GENERALIZED OSTEOARTHRITIS OF MULTIPLE SITES: ICD-10-CM

## 2024-03-07 DIAGNOSIS — J45.20 MILD INTERMITTENT ASTHMA WITHOUT COMPLICATION: ICD-10-CM

## 2024-03-07 DIAGNOSIS — G47.9 DISTURBANCE OF SLEEP: ICD-10-CM

## 2024-03-07 DIAGNOSIS — I10 PRIMARY HYPERTENSION: Primary | ICD-10-CM

## 2024-03-07 DIAGNOSIS — E78.2 MIXED HYPERLIPIDEMIA: ICD-10-CM

## 2024-03-07 PROCEDURE — 99214 OFFICE O/P EST MOD 30 MIN: CPT | Performed by: FAMILY MEDICINE

## 2024-03-07 PROCEDURE — G2211 COMPLEX E/M VISIT ADD ON: HCPCS | Performed by: FAMILY MEDICINE

## 2024-03-07 RX ORDER — OXYBUTYNIN CHLORIDE 5 MG/1
5 TABLET, EXTENDED RELEASE ORAL DAILY
Qty: 30 TABLET | Refills: 3 | Status: SHIPPED | OUTPATIENT
Start: 2024-03-07

## 2024-03-07 RX ORDER — ZOLPIDEM TARTRATE 10 MG/1
10 TABLET ORAL
Qty: 30 TABLET | Refills: 0 | Status: SHIPPED | OUTPATIENT
Start: 2024-03-07

## 2024-03-07 NOTE — PROGRESS NOTES
Name: Lane Ceron      : 1945      MRN: [id number]  Encounter Provider: Giovanny Cook MD  Encounter Date: 3/7/2024   Encounter department: [facility]    Assessment & Plan     1. Primary hypertension  Assessment & Plan:  STABLE  DENIES ANY CP, SOB, PALPITATIONS, OR HEADACHE  NOTES NO WATER RETENTION  COMPLIANT WITH MEDICATION  NO CONCERNS    - CONTINUE CURRENT TREATMENT PLAN  - MONITOR DIETARY SODIUM INTAKE  - ENCOURAGE PHYSICAL ACTIVITY  - RV 3 MONTHS        2. Mixed hyperlipidemia  Assessment & Plan:  WATCHING CHOLESTEROL INTAKE  COMPLIANT WITH MEDICATION  NO CONCERNS    - CONTINUE TO MONITOR DIETARY CHOL INTAKE  - CONTINUE CURRENT MEDICATION  - ENCOURAGED PHYSICAL ACTIVITY        3. Mild intermittent asthma without complication  Assessment & Plan:  STABLE        4. Generalized osteoarthritis of multiple sites  Assessment & Plan:  STABLE  DENIES ANY JOINT SWELLING OR REDNESS  JOINT STIFFNESS PRESENT  PAIN MANAGEMENT ADEQUATE    - CONTINUE CURRENT MANAGEMENT  - MEDICATION AS DIRECTED  - CALL / RETURN IF SYMPTOMS WORSEN        5. Spinal stenosis of lumbar region with neurogenic claudication    6. Urinary frequency  -     oxybutynin (DITROPAN-XL) 5 mg 24 hr tablet; Take 1 tablet (5 mg total) by mouth daily           Subjective      PATIENT RETURNS FOR ROUTINE EVALUATION OF PATIENT'S MEDICAL ISSUES    INDIVIDUAL MEDICAL ISSUES WITH THEIR CURRENT STATUS, ASSESSMENT AND PLANS ARE LISTED ABOVE            Review of Systems   Constitutional:  Negative for chills, fatigue and fever.   HENT:  Negative for congestion, ear discharge, ear pain, mouth sores, postnasal drip, sore throat and trouble swallowing.    Eyes:  Negative for pain, discharge and visual disturbance.   Respiratory:  Negative for cough, shortness of breath and wheezing.    Cardiovascular:  Negative for chest pain, palpitations and leg swelling.   Gastrointestinal:  Negative for abdominal distention, abdominal  pain, blood in stool, diarrhea and nausea.   Endocrine: Negative for polydipsia, polyphagia and polyuria.   Genitourinary:  Negative for dysuria, frequency, hematuria and urgency.   Musculoskeletal:  Negative for arthralgias, gait problem and joint swelling.   Skin:  Negative for pallor and rash.   Neurological:  Negative for dizziness, syncope, speech difficulty, weakness, light-headedness, numbness and headaches.   Hematological:  Negative for adenopathy.   Psychiatric/Behavioral:  Negative for behavioral problems, confusion and sleep disturbance. The patient is not nervous/anxious.        Current Outpatient Medications on File Prior to Visit   Medication Sig   • ALPRAZolam (XANAX) 0.5 mg tablet Take 1 tablet (0.5 mg total) by mouth daily at bedtime as needed for anxiety   • aspirin (ECOTRIN LOW STRENGTH) 81 mg EC tablet Take 81 mg by mouth daily   • colchicine (COLCRYS) 0.6 mg tablet Take 1 tablet (0.6 mg total) by mouth 2 (two) times a day   • finasteride (PROSCAR) 5 mg tablet Take 5 mg by mouth daily   • mometasone (Asmanex, 120 Metered Doses,) 220 mcg/actuation inhaler Inhale 1 puff every evening Rinse mouth after use.   • multivitamin (THERAGRAN) TABS Take 1 tablet by mouth daily   • nabumetone (RELAFEN) 750 mg tablet Take 1 tablet (750 mg total) by mouth 2 (two) times a day   • Probiotic Product (PROBIOTIC PO) Take by mouth   • rosuvastatin (CRESTOR) 20 MG tablet Take 1 tablet (20 mg total) by mouth daily   • traZODone (DESYREL) 100 mg tablet Take 2 tablets (200 mg total) by mouth daily at bedtime   • triamcinolone (KENALOG) 0.1 % ointment Apply topically 2 (two) times a day   • zolpidem (AMBIEN) 10 mg tablet TAKE 1 TABLET (10 MG TOTAL) BY MOUTH DAILY AT BEDTIME AS NEEDED FOR SLEEP   • [DISCONTINUED] Mirabegron ER 50 MG TB24 Take 1 tablet (50 mg total) by mouth in the morning   • [DISCONTINUED] zolpidem (AMBIEN) 10 mg tablet Take 1 tablet (10 mg total) by mouth daily at bedtime as needed for sleep        Objective     /62 (BP Location: Right arm, Patient Position: Sitting, Cuff Size: Extra-Large)   Pulse 66   Temp (!) 97.4 °F (36.3 °C) (Temporal)   Resp 18   Wt 88 kg (194 lb)   SpO2 98%   BMI 25.60 kg/m²     Physical Exam  Vitals reviewed.   Constitutional:       General: He is not in acute distress.     Appearance: Normal appearance. He is well-developed and normal weight. He is not ill-appearing.   HENT:      Head: Normocephalic and atraumatic.   Eyes:      General:         Right eye: No discharge.         Left eye: No discharge.      Conjunctiva/sclera: Conjunctivae normal.      Pupils: Pupils are equal, round, and reactive to light.   Neck:      Thyroid: No thyromegaly.      Vascular: No JVD.   Cardiovascular:      Rate and Rhythm: Normal rate and regular rhythm.      Heart sounds: Normal heart sounds. No murmur heard.  Pulmonary:      Effort: Pulmonary effort is normal.      Breath sounds: Normal breath sounds. No wheezing or rales.   Abdominal:      General: Bowel sounds are normal.      Palpations: Abdomen is soft. There is no mass.      Tenderness: There is no abdominal tenderness. There is no guarding or rebound.   Musculoskeletal:         General: No tenderness or deformity. Normal range of motion.      Cervical back: Neck supple.   Lymphadenopathy:      Cervical: No cervical adenopathy.   Skin:     General: Skin is warm and dry.      Findings: No erythema or rash.   Neurological:      General: No focal deficit present.      Mental Status: He is alert and oriented to person, place, and time.   Psychiatric:         Mood and Affect: Mood normal.         Behavior: Behavior normal.         Thought Content: Thought content normal.         Judgment: Judgment normal.       Giovanny Cook MD

## 2024-03-07 NOTE — LETTER
JOHANNE SHIRLEY      Current Outpatient Medications:     ALPRAZolam (XANAX) 0.5 mg tablet, Take 1 tablet (0.5 mg total) by mouth daily at bedtime as needed for anxiety, Disp: 30 tablet, Rfl: 0    aspirin (ECOTRIN LOW STRENGTH) 81 mg EC tablet, Take 81 mg by mouth daily, Disp: , Rfl:     colchicine (COLCRYS) 0.6 mg tablet, Take 1 tablet (0.6 mg total) by mouth 2 (two) times a day, Disp: 60 tablet, Rfl: 5    finasteride (PROSCAR) 5 mg tablet, Take 5 mg by mouth daily, Disp: , Rfl:     Mirabegron ER 50 MG TB24, Take 1 tablet (50 mg total) by mouth in the morning, Disp: 90 tablet, Rfl: 1    mometasone (Asmanex, 120 Metered Doses,) 220 mcg/actuation inhaler, Inhale 1 puff every evening Rinse mouth after use., Disp: 1 each, Rfl: 3    multivitamin (THERAGRAN) TABS, Take 1 tablet by mouth daily, Disp: , Rfl:     nabumetone (RELAFEN) 750 mg tablet, Take 1 tablet (750 mg total) by mouth 2 (two) times a day, Disp: 180 tablet, Rfl: 1    Probiotic Product (PROBIOTIC PO), Take by mouth, Disp: , Rfl:     rosuvastatin (CRESTOR) 20 MG tablet, Take 1 tablet (20 mg total) by mouth daily, Disp: 90 tablet, Rfl: 3    traZODone (DESYREL) 100 mg tablet, Take 2 tablets (200 mg total) by mouth daily at bedtime, Disp: 60 tablet, Rfl: 0    triamcinolone (KENALOG) 0.1 % ointment, Apply topically 2 (two) times a day, Disp: 80 g, Rfl: 2    zolpidem (AMBIEN) 10 mg tablet, TAKE 1 TABLET (10 MG TOTAL) BY MOUTH DAILY AT BEDTIME AS NEEDED FOR SLEEP, Disp: 30 tablet, Rfl: 0    zolpidem (AMBIEN) 10 mg tablet, Take 1 tablet (10 mg total) by mouth daily at bedtime as needed for sleep, Disp: 30 tablet, Rfl: 0    Recent Results (from the past 672 hour(s))   Lipid panel    Collection Time: 02/19/24 11:23 AM   Result Value Ref Range    Total Cholesterol 119 <200 mg/dL    HDL 56 > OR = 40 mg/dL    Triglycerides 86 <150 mg/dL    LDL Calculated 46 mg/dL (calc)    Chol HDLC Ratio 2.1 <5.0 (calc)    Non-HDL Cholesterol 63 <130 mg/dL (calc)   Comprehensive metabolic  panel    Collection Time: 02/19/24 11:23 AM   Result Value Ref Range    Glucose, Random 87 65 - 99 mg/dL    BUN 14 7 - 25 mg/dL    Creatinine 0.99 0.70 - 1.28 mg/dL    eGFR 78 > OR = 60 mL/min/1.73m2    SL AMB BUN/CREATININE RATIO SEE NOTE: 6 - 22 (calc)    Sodium 140 135 - 146 mmol/L    Potassium 5.4 (H) 3.5 - 5.3 mmol/L    Chloride 100 98 - 110 mmol/L    CO2 34 (H) 20 - 32 mmol/L    Calcium 9.7 8.6 - 10.3 mg/dL    Protein, Total 6.2 6.1 - 8.1 g/dL    Albumin 4.3 3.6 - 5.1 g/dL    Globulin 1.9 1.9 - 3.7 g/dL (calc)    Albumin/Globulin Ratio 2.3 1.0 - 2.5 (calc)    TOTAL BILIRUBIN 0.6 0.2 - 1.2 mg/dL    Alkaline Phosphatase 66 35 - 144 U/L    AST 25 10 - 35 U/L    ALT 16 9 - 46 U/L

## 2024-03-07 NOTE — PATIENT INSTRUCTIONS
CONTINUE CURRENT TREATMENT PLAN  MONITOR DIETARY SODIUM, CHOL INTAKE  ENCOURAGE PHYSICAL ACTIVITY      RV 6 M, SOONER PRN      Recent Results (from the past 672 hour(s))   Lipid panel    Collection Time: 02/19/24 11:23 AM   Result Value Ref Range    Total Cholesterol 119 <200 mg/dL    HDL 56 > OR = 40 mg/dL    Triglycerides 86 <150 mg/dL    LDL Calculated 46 mg/dL (calc)    Chol HDLC Ratio 2.1 <5.0 (calc)    Non-HDL Cholesterol 63 <130 mg/dL (calc)   Comprehensive metabolic panel    Collection Time: 02/19/24 11:23 AM   Result Value Ref Range    Glucose, Random 87 65 - 99 mg/dL    BUN 14 7 - 25 mg/dL    Creatinine 0.99 0.70 - 1.28 mg/dL    eGFR 78 > OR = 60 mL/min/1.73m2    SL AMB BUN/CREATININE RATIO SEE NOTE: 6 - 22 (calc)    Sodium 140 135 - 146 mmol/L    Potassium 5.4 (H) 3.5 - 5.3 mmol/L    Chloride 100 98 - 110 mmol/L    CO2 34 (H) 20 - 32 mmol/L    Calcium 9.7 8.6 - 10.3 mg/dL    Protein, Total 6.2 6.1 - 8.1 g/dL    Albumin 4.3 3.6 - 5.1 g/dL    Globulin 1.9 1.9 - 3.7 g/dL (calc)    Albumin/Globulin Ratio 2.3 1.0 - 2.5 (calc)    TOTAL BILIRUBIN 0.6 0.2 - 1.2 mg/dL    Alkaline Phosphatase 66 35 - 144 U/L    AST 25 10 - 35 U/L    ALT 16 9 - 46 U/L

## 2024-03-11 DIAGNOSIS — G47.9 DISTURBANCE OF SLEEP: ICD-10-CM

## 2024-03-11 RX ORDER — TRAZODONE HYDROCHLORIDE 100 MG/1
200 TABLET ORAL
Qty: 60 TABLET | Refills: 0 | Status: SHIPPED | OUTPATIENT
Start: 2024-03-11

## 2024-03-25 DIAGNOSIS — G47.9 DISTURBANCE OF SLEEP: ICD-10-CM

## 2024-03-26 RX ORDER — ALPRAZOLAM 0.5 MG/1
0.5 TABLET ORAL
Qty: 30 TABLET | Refills: 0 | Status: SHIPPED | OUTPATIENT
Start: 2024-03-26

## 2024-03-26 RX ORDER — ZOLPIDEM TARTRATE 10 MG/1
10 TABLET ORAL
Qty: 30 TABLET | Refills: 0 | Status: SHIPPED | OUTPATIENT
Start: 2024-03-26

## 2024-04-08 DIAGNOSIS — R21 RASH: ICD-10-CM

## 2024-04-08 DIAGNOSIS — G47.9 DISTURBANCE OF SLEEP: ICD-10-CM

## 2024-04-09 RX ORDER — ZOLPIDEM TARTRATE 10 MG/1
10 TABLET ORAL
Qty: 30 TABLET | Refills: 0 | Status: SHIPPED | OUTPATIENT
Start: 2024-04-09

## 2024-04-09 RX ORDER — TRIAMCINOLONE ACETONIDE 1 MG/G
OINTMENT TOPICAL 2 TIMES DAILY
Qty: 80 G | Refills: 0 | OUTPATIENT
Start: 2024-04-09

## 2024-04-09 RX ORDER — TRAZODONE HYDROCHLORIDE 100 MG/1
200 TABLET ORAL
Qty: 60 TABLET | Refills: 5 | Status: SHIPPED | OUTPATIENT
Start: 2024-04-09

## 2024-04-09 RX ORDER — TRIAMCINOLONE ACETONIDE 1 MG/G
OINTMENT TOPICAL 2 TIMES DAILY
Qty: 80 G | Refills: 2 | Status: SHIPPED | OUTPATIENT
Start: 2024-04-09

## 2024-04-12 ENCOUNTER — TELEPHONE (OUTPATIENT)
Age: 79
End: 2024-04-12

## 2024-04-12 NOTE — TELEPHONE ENCOUNTER
Patient called asking for Ambien 10 mg to be changed to Ambien CR (did state a strength), Oxybutynin 5 mg to be changed to 15 mg, Alprazolam 0.5 mg to 1 mg.  Patient stated he is having trouble sleeping at night.  If approved please send to [facility].

## 2024-04-15 DIAGNOSIS — E78.2 MIXED HYPERLIPIDEMIA: ICD-10-CM

## 2024-04-15 DIAGNOSIS — R93.1 ELEVATED CORONARY ARTERY CALCIUM SCORE: ICD-10-CM

## 2024-04-15 DIAGNOSIS — I25.10 ATHEROSCLEROSIS OF CORONARY ARTERY OF NATIVE HEART WITHOUT ANGINA PECTORIS, UNSPECIFIED VESSEL OR LESION TYPE: ICD-10-CM

## 2024-04-15 RX ORDER — ROSUVASTATIN CALCIUM 20 MG/1
20 TABLET, COATED ORAL DAILY
Qty: 90 TABLET | Refills: 1 | Status: SHIPPED | OUTPATIENT
Start: 2024-04-15

## 2024-04-24 ENCOUNTER — TELEPHONE (OUTPATIENT)
Dept: ADMINISTRATIVE | Facility: OTHER | Age: 79
End: 2024-04-24

## 2024-04-24 NOTE — TELEPHONE ENCOUNTER
04/24/24 1:23 PM    Patient contacted (left message) to bring Advance Directive, POLST, or Living Will document to next scheduled pcp visit.    Thank you.  Orin Henao [facility]

## 2024-04-29 ENCOUNTER — OFFICE VISIT (OUTPATIENT)
Dept: FAMILY MEDICINE CLINIC | Facility: CLINIC | Age: 79
End: 2024-04-29
Payer: COMMERCIAL

## 2024-04-29 VITALS
DIASTOLIC BLOOD PRESSURE: 60 MMHG | BODY MASS INDEX: 25.6 KG/M2 | RESPIRATION RATE: 16 BRPM | WEIGHT: 193.2 LBS | TEMPERATURE: 97.7 F | HEART RATE: 60 BPM | HEIGHT: 73 IN | SYSTOLIC BLOOD PRESSURE: 110 MMHG | OXYGEN SATURATION: 98 %

## 2024-04-29 DIAGNOSIS — G47.9 DISTURBANCE OF SLEEP: ICD-10-CM

## 2024-04-29 DIAGNOSIS — N32.89 BLADDER IRRITABILITY: ICD-10-CM

## 2024-04-29 DIAGNOSIS — R35.0 URINARY FREQUENCY: ICD-10-CM

## 2024-04-29 DIAGNOSIS — R39.11 URINARY HESITANCY: Primary | ICD-10-CM

## 2024-04-29 PROBLEM — Z23 NEED FOR INFLUENZA VACCINATION: Status: RESOLVED | Noted: 2021-09-01 | Resolved: 2024-04-29

## 2024-04-29 PROCEDURE — G2211 COMPLEX E/M VISIT ADD ON: HCPCS | Performed by: FAMILY MEDICINE

## 2024-04-29 PROCEDURE — 99213 OFFICE O/P EST LOW 20 MIN: CPT | Performed by: FAMILY MEDICINE

## 2024-04-29 RX ORDER — OXYBUTYNIN CHLORIDE 10 MG/1
10 TABLET, EXTENDED RELEASE ORAL DAILY
Qty: 30 TABLET | Refills: 3 | Status: SHIPPED | OUTPATIENT
Start: 2024-04-29

## 2024-04-29 NOTE — PATIENT INSTRUCTIONS
PLENTY OF FLUIDS - HYDRATION  WILL INCREASE DITROPAN  DISCUSSED SIDE EFFECTS    CALL WITH UPDATE IN 3-4 WEEKS, SOONER PRN

## 2024-04-29 NOTE — PROGRESS NOTES
Name: Lane Ceron      : 1945      MRN: [id number]  Encounter Provider: Giovanny Cook MD  Encounter Date: 2024   Encounter department: [facility]    Assessment & Plan     1. Urinary hesitancy    2. Urinary frequency  -     oxybutynin (DITROPAN-XL) 10 MG 24 hr tablet; Take 1 tablet (10 mg total) by mouth daily    3. Bladder irritability        Depression Screening and Follow-up Plan: Patient was screened for depression during today's encounter. They screened negative with a PHQ-2 score of 0.        Subjective      DISCUSSION    REVIEWED BLADDER ISSUES  DISCUSSED THERAPEUTIC PLAN      Review of Systems   Constitutional:  Negative for chills, fatigue and fever.   HENT:  Negative for sore throat.    Eyes:  Negative for discharge.   Respiratory:  Negative for cough and chest tightness.    Cardiovascular:  Negative for chest pain and palpitations.   Gastrointestinal:  Negative for abdominal pain, diarrhea, nausea and vomiting.   Genitourinary:  Positive for decreased urine volume, difficulty urinating and frequency.   Musculoskeletal:  Negative for arthralgias and gait problem.   Neurological:  Negative for dizziness, weakness and headaches.   Hematological:  Negative for adenopathy.   Psychiatric/Behavioral:  The patient is not nervous/anxious.        Current Outpatient Medications on File Prior to Visit   Medication Sig   • ALPRAZolam (XANAX) 0.5 mg tablet Take 1 tablet (0.5 mg total) by mouth daily at bedtime as needed for anxiety   • aspirin (ECOTRIN LOW STRENGTH) 81 mg EC tablet Take 81 mg by mouth daily   • finasteride (PROSCAR) 5 mg tablet Take 5 mg by mouth daily   • mometasone (Asmanex, 120 Metered Doses,) 220 mcg/actuation inhaler Inhale 1 puff every evening Rinse mouth after use.   • multivitamin (THERAGRAN) TABS Take 1 tablet by mouth daily   • nabumetone (RELAFEN) 750 mg tablet Take 1 tablet (750 mg total) by mouth 2 (two) times a day   • Probiotic Product  "(PROBIOTIC PO) Take by mouth   • rosuvastatin (CRESTOR) 20 MG tablet Take 1 tablet (20 mg total) by mouth daily   • traZODone (DESYREL) 100 mg tablet Take 2 tablets (200 mg total) by mouth daily at bedtime   • triamcinolone (KENALOG) 0.1 % ointment APPLY TOPICALLY 2 (TWO) TIMES A DAY   • zolpidem (AMBIEN) 10 mg tablet Take 1 tablet (10 mg total) by mouth daily at bedtime as needed for sleep   • [DISCONTINUED] oxybutynin (DITROPAN-XL) 5 mg 24 hr tablet Take 1 tablet (5 mg total) by mouth daily   • [DISCONTINUED] colchicine (COLCRYS) 0.6 mg tablet Take 1 tablet (0.6 mg total) by mouth 2 (two) times a day   • [DISCONTINUED] zolpidem (AMBIEN) 10 mg tablet TAKE 1 TABLET (10 MG TOTAL) BY MOUTH DAILY AT BEDTIME AS NEEDED FOR SLEEP       Objective     /60   Pulse 60   Temp 97.7 °F (36.5 °C) (Temporal)   Resp 16   Ht 6' 1\" (1.854 m)   Wt 87.6 kg (193 lb 3.2 oz)   SpO2 98%   BMI 25.49 kg/m²     Physical Exam    NO PE PERFORMED    LENGTH OF VISIT 15 MIN  LENNGTH OF  15 MIN  Giovanny Cook MD    "

## 2024-04-30 RX ORDER — ALPRAZOLAM 0.5 MG/1
0.5 TABLET ORAL
Qty: 30 TABLET | Refills: 0 | Status: SHIPPED | OUTPATIENT
Start: 2024-04-30

## 2024-04-30 RX ORDER — ZOLPIDEM TARTRATE 10 MG/1
10 TABLET ORAL
Qty: 30 TABLET | Refills: 0 | Status: SHIPPED | OUTPATIENT
Start: 2024-04-30 | End: 2024-05-08 | Stop reason: SDUPTHER

## 2024-05-08 DIAGNOSIS — G47.9 DISTURBANCE OF SLEEP: ICD-10-CM

## 2024-05-08 RX ORDER — ZOLPIDEM TARTRATE 10 MG/1
10 TABLET ORAL
Qty: 30 TABLET | Refills: 0 | Status: SHIPPED | OUTPATIENT
Start: 2024-05-08

## 2024-05-08 NOTE — TELEPHONE ENCOUNTER
Requested medication(s) are due for refill today: No  Patient has already received a courtesy refill: No  Other reason request has been forwarded to provider: DUPLICATE - PLEASE DELETE

## 2024-05-16 ENCOUNTER — TELEPHONE (OUTPATIENT)
Age: 79
End: 2024-05-16

## 2024-05-16 NOTE — TELEPHONE ENCOUNTER
Pt requested a refill for Mirabegron ER (MYRBETRIQ) 50 MG TB24 due to the oxybutynin chloride has not been working.         If PCP approves please send med to the [facility] on file.       Thank you for your help.    negative...

## 2024-05-17 DIAGNOSIS — R35.0 URINARY FREQUENCY: Primary | ICD-10-CM

## 2024-06-02 DIAGNOSIS — G47.9 DISTURBANCE OF SLEEP: ICD-10-CM

## 2024-06-02 RX ORDER — TRAZODONE HYDROCHLORIDE 100 MG/1
200 TABLET ORAL
Qty: 60 TABLET | Refills: 5 | Status: SHIPPED | OUTPATIENT
Start: 2024-06-02

## 2024-06-03 DIAGNOSIS — G47.9 DISTURBANCE OF SLEEP: ICD-10-CM

## 2024-06-03 RX ORDER — ZOLPIDEM TARTRATE 10 MG/1
10 TABLET ORAL
Qty: 30 TABLET | Refills: 0 | Status: SHIPPED | OUTPATIENT
Start: 2024-06-03

## 2024-06-03 RX ORDER — ALPRAZOLAM 0.5 MG/1
0.5 TABLET ORAL
Qty: 30 TABLET | Refills: 0 | Status: SHIPPED | OUTPATIENT
Start: 2024-06-03

## 2024-06-03 NOTE — TELEPHONE ENCOUNTER
Reason for call: Patient is out of medication   [x] Refill   [] Prior Auth  [] Other:     Office:   [x] PCP/Provider - Dr Cook   [] Specialty/Provider -     Medication: zolpidem / alprazolam     Dose/Frequency: 10 mg at bedtime / 0.5 mg at bedtime     Quantity: 30D    Pharmacy: [facility] on file     Does the patient have enough for 3 days?   [] Yes   [x] No - Send as HP to POD

## 2024-07-01 DIAGNOSIS — G47.9 DISTURBANCE OF SLEEP: ICD-10-CM

## 2024-07-01 RX ORDER — ALPRAZOLAM 0.5 MG/1
0.5 TABLET ORAL
Qty: 30 TABLET | Refills: 0 | Status: SHIPPED | OUTPATIENT
Start: 2024-07-01

## 2024-07-01 RX ORDER — ZOLPIDEM TARTRATE 10 MG/1
10 TABLET ORAL
Qty: 30 TABLET | Refills: 0 | Status: SHIPPED | OUTPATIENT
Start: 2024-07-01

## 2024-07-01 NOTE — TELEPHONE ENCOUNTER
Requested medication(s) are due for refill today: Yes  Patient has already received a courtesy refill: No  Other reason request has been forwarded to provider: Possible duplicate medications

## 2024-07-01 NOTE — TELEPHONE ENCOUNTER
Reason for call:   [x] Refill   [] Prior Auth  [] Other:     Office:   [x] PCP/Provider - [facility]  [] Specialty/Provider -     Medication(s):         Pharmacy: [facility] - [address] [phone]     Does the patient have enough for 3 days?   [] Yes   [x] No - Send as HP to POD

## 2024-07-23 ENCOUNTER — TELEPHONE (OUTPATIENT)
Age: 79
End: 2024-07-23

## 2024-07-23 DIAGNOSIS — G47.9 DISTURBANCE OF SLEEP: ICD-10-CM

## 2024-07-23 NOTE — TELEPHONE ENCOUNTER
Patient is requesting a change to his medication .  He is currently taking Zolpidem and would like to change to Zolpidem ER   Additionally, he would like to increase his current dosage of Xanax from 0.5 to  1.0   He is willing to come in to be seen by Dr. Cook if necessary.  He is using the [facility]

## 2024-07-23 NOTE — TELEPHONE ENCOUNTER
PT needs a refill of zolpidem (ambien) 10mg and alprazolam (xanax) at 0.5mg  and sent to [facility].  He did want a zolpidem extended release and higher dosage xanax but he needed an appt to do so but he busy the next few weeks so like to refill at his current dosage and will discuss with  at his next appt

## 2024-07-24 RX ORDER — ZOLPIDEM TARTRATE 10 MG/1
10 TABLET ORAL
Qty: 30 TABLET | Refills: 0 | OUTPATIENT
Start: 2024-07-24

## 2024-07-24 NOTE — TELEPHONE ENCOUNTER
Requested medication(s) are due for refill today: No  Patient has already received a courtesy refill: Yes  Other reason request has been forwarded to provider: refill requested too soon

## 2024-07-30 DIAGNOSIS — G47.9 DISTURBANCE OF SLEEP: ICD-10-CM

## 2024-07-30 DIAGNOSIS — M15.9 GENERALIZED OSTEOARTHRITIS OF MULTIPLE SITES: ICD-10-CM

## 2024-07-30 RX ORDER — NABUMETONE 750 MG/1
750 TABLET, FILM COATED ORAL 2 TIMES DAILY
Qty: 180 TABLET | Refills: 1 | Status: SHIPPED | OUTPATIENT
Start: 2024-07-30

## 2024-07-30 RX ORDER — ALPRAZOLAM 0.5 MG/1
0.5 TABLET ORAL
Qty: 30 TABLET | Refills: 0 | Status: SHIPPED | OUTPATIENT
Start: 2024-07-30

## 2024-07-30 RX ORDER — ZOLPIDEM TARTRATE 10 MG/1
10 TABLET ORAL
Qty: 30 TABLET | Refills: 0 | Status: SHIPPED | OUTPATIENT
Start: 2024-07-30

## 2024-07-30 NOTE — TELEPHONE ENCOUNTER
Refill must be reviewed and completed by the office or provider. The refill is unable to be approved or denied by the medication management team.    Please review to see if the refill is appropriate.    This refill cannot be delegated

## 2024-07-30 NOTE — TELEPHONE ENCOUNTER
Reason for call:   [x] Refill   [] Prior Auth  [] Other:     Office:   [x] PCP/Provider - [facility]  Authorized By: Giovanny Cook MD  [] Specialty/Provider -     Medication: nabumetone (RELAFEN) 750 mg tablet     Dose/Frequency: Take 1 tablet (750 mg total) by mouth 2 (two) times a day     Quantity: 180 tablet     Pharmacy: [address]      Does the patient have enough for 3 days?   [x] Yes   [] No - Send as HP to POD

## 2024-07-30 NOTE — TELEPHONE ENCOUNTER
Reason for call:   [x] Refill   [] Prior Auth  [] Other:     Office:   [x] PCP/Provider -   [] Specialty/Provider -           Does the patient have enough for 3 days?   [] Yes   [x] No - Send as HP to POD

## 2024-08-05 DIAGNOSIS — E78.2 MIXED HYPERLIPIDEMIA: Primary | ICD-10-CM

## 2024-08-05 DIAGNOSIS — Z00.00 MEDICARE ANNUAL WELLNESS VISIT, SUBSEQUENT: ICD-10-CM

## 2024-08-13 ENCOUNTER — APPOINTMENT (OUTPATIENT)
Dept: RADIOLOGY | Facility: CLINIC | Age: 79
End: 2024-08-13
Payer: COMMERCIAL

## 2024-08-13 ENCOUNTER — OFFICE VISIT (OUTPATIENT)
Dept: PAIN MEDICINE | Facility: CLINIC | Age: 79
End: 2024-08-13
Payer: COMMERCIAL

## 2024-08-13 VITALS
WEIGHT: 192 LBS | DIASTOLIC BLOOD PRESSURE: 70 MMHG | HEART RATE: 56 BPM | SYSTOLIC BLOOD PRESSURE: 150 MMHG | HEIGHT: 74 IN | BODY MASS INDEX: 24.64 KG/M2

## 2024-08-13 DIAGNOSIS — M54.42 CHRONIC MIDLINE LOW BACK PAIN WITH LEFT-SIDED SCIATICA: ICD-10-CM

## 2024-08-13 DIAGNOSIS — M54.16 LUMBAR RADICULOPATHY: ICD-10-CM

## 2024-08-13 DIAGNOSIS — G89.29 CHRONIC MIDLINE LOW BACK PAIN WITH LEFT-SIDED SCIATICA: ICD-10-CM

## 2024-08-13 DIAGNOSIS — M48.062 SPINAL STENOSIS OF LUMBAR REGION WITH NEUROGENIC CLAUDICATION: Primary | ICD-10-CM

## 2024-08-13 DIAGNOSIS — M48.062 SPINAL STENOSIS OF LUMBAR REGION WITH NEUROGENIC CLAUDICATION: ICD-10-CM

## 2024-08-13 PROCEDURE — 72110 X-RAY EXAM L-2 SPINE 4/>VWS: CPT

## 2024-08-13 PROCEDURE — 99214 OFFICE O/P EST MOD 30 MIN: CPT | Performed by: PHYSICAL MEDICINE & REHABILITATION

## 2024-08-13 NOTE — H&P (VIEW-ONLY)
Pain Medicine Follow-Up Note    Assessment:  1. Spinal stenosis of lumbar region with neurogenic claudication    2. Lumbar radiculopathy    3. Chronic midline low back pain with left-sided sciatica        Plan:  Mr. Ceron is a pleasant 78-year-old male significant past medical history of lumbar spinal stenosis with neurogenic claudication presents to [facility] for follow-up and reevaluation.  He was being followed by Dr. Hurt and then last underwent a L5-S1 LESI August 2023 with me with significant nearly 100% relief in his pain for over 9 to 10 months and a gradual return of symptoms.  Reports his symptoms are similar to previously and impacting his quality of life and activities of daily living.  At this time we will plan for repeat L5-S1 LESI under fluoroscopy guidance.  Will also order updated images as the last MRI and x-ray were conducted over 2 years ago.  All questions answered, patient is agreeable with plan.    Complete risks and benefits including bleeding, infection, tissue reaction, nerve injury and allergic reaction were discussed. The approach was demonstrated using models and literature was provided. Verbal and written consent was obtained.    History of Present Illness:    Lane Ceron is a 78 y.o. male who presents to [facility] for interval re-evaluation of the above stated pain complaints. The patient has a past medical and chronic pain history as outlined in the assessment section.  Patient presents to [facility] and pain [facility] for follow-up, reevaluation and transfer of care regarding ongoing low back pain with rating symptoms into the right lower extremity.  He was previously seen by Dr. Hurt and had undergone an L5-S1 LESI with significant improvement in his pain and a gradual return of symptoms.  Today reports 7 out of 10 pain described as a constant sharp, pressure-like, throbbing sensation that is worse in the  morning.  Presents today for follow-up and reevaluation.    Other than as stated above, the patient denies any interval changes in medications, medical condition, mental condition, symptoms, or allergies since the last office visit.         Review of Systems:    Review of Systems   Constitutional:  Negative for unexpected weight change.   HENT:  Negative for ear pain.    Eyes:  Negative for visual disturbance.   Respiratory:  Negative for shortness of breath and wheezing.    Gastrointestinal:  Negative for abdominal pain.   Musculoskeletal:  Positive for back pain and gait problem.        Decreased ROM, joint and muscle weakness   Neurological:  Negative for weakness and numbness.   Psychiatric/Behavioral:  Negative for decreased concentration.          Past Medical History:   Diagnosis Date    Arthritis 2010    Asthma 2010    Cataract     resolved: 7/20/2016    Glaucoma     Sciatica, right side     last assessed: 7/20/2016       Past Surgical History:   Procedure Laterality Date    EPIDURAL BLOCK INJECTION N/A 5/5/2023    Procedure: BLOCK / INJECTION EPIDURAL STEROID LUMBAR L4-L5 LESI;  Surgeon: Osman Erickson MD;  Location: [facility] MAIN OR;  Service: Pain Management     EPIDURAL BLOCK INJECTION N/A 8/30/2023    Procedure: L5-S1 LUMBAR epidural steroid injection (70370);  Surgeon: Blane Wagner DO;  Location: [facility] MAIN OR;  Service: Pain Management     LUMBAR EPIDURAL INJECTION N/A 8/11/2022    Procedure: L5 S1 LUMBAR epidural steroid injection (47972);  Surgeon: Osman Erickson MD;  Location: [facility] MAIN OR;  Service: Pain Management     LUMBAR EPIDURAL INJECTION N/A 9/30/2022    Procedure: L5 S1 LUMBAR epidural steroid injection ( [id number]);  Surgeon: Osman Erickson MD;  Location: [facility] MAIN OR;  Service: Pain Management     LUMBAR EPIDURAL INJECTION N/A 1/4/2023    Procedure: L5 S1 LUMBAR epidural steroid injection ([id number]);  Surgeon: Blane Wagner DO;  Location: [facility] MAIN OR;  Service: Pain  Management     TONSILLECTOMY         Family History   Problem Relation Age of Onset    Coronary artery disease Mother     Cancer Mother     Coronary artery disease Father     Substance Abuse Daughter     Mental illness Neg Hx        Social History     Occupational History    Not on file   Tobacco Use    Smoking status: Former     Current packs/day: 0.00     Average packs/day: 2.0 packs/day for 20.0 years (40.0 ttl pk-yrs)     Types: Cigarettes     Start date: 1966     Quit date: 1980     Years since quittin.6     Passive exposure: Past    Smokeless tobacco: Never   Vaping Use    Vaping status: Never Used   Substance and Sexual Activity    Alcohol use: Not Currently    Drug use: Never    Sexual activity: Not Currently     Partners: Female         Current Outpatient Medications:     ALPRAZolam (XANAX) 0.5 mg tablet, Take 1 tablet (0.5 mg total) by mouth daily at bedtime as needed for anxiety, Disp: 30 tablet, Rfl: 0    aspirin (ECOTRIN LOW STRENGTH) 81 mg EC tablet, Take 81 mg by mouth daily, Disp: , Rfl:     finasteride (PROSCAR) 5 mg tablet, Take 5 mg by mouth daily, Disp: , Rfl:     Mirabegron ER 50 MG TB24, Take 1 tablet (50 mg total) by mouth in the morning, Disp: 30 tablet, Rfl: 3    mometasone (Asmanex, 120 Metered Doses,) 220 mcg/actuation inhaler, Inhale 1 puff every evening Rinse mouth after use., Disp: 1 each, Rfl: 5    multivitamin (THERAGRAN) TABS, Take 1 tablet by mouth daily, Disp: , Rfl:     nabumetone (RELAFEN) 750 mg tablet, Take 1 tablet (750 mg total) by mouth 2 (two) times a day, Disp: 180 tablet, Rfl: 1    Probiotic Product (PROBIOTIC PO), Take by mouth, Disp: , Rfl:     rosuvastatin (CRESTOR) 20 MG tablet, Take 1 tablet (20 mg total) by mouth daily, Disp: 90 tablet, Rfl: 1    traZODone (DESYREL) 100 mg tablet, Take 2 tablets (200 mg total) by mouth daily at bedtime, Disp: 60 tablet, Rfl: 5    triamcinolone (KENALOG) 0.1 % ointment, APPLY TOPICALLY 2 (TWO) TIMES A DAY, Disp: 80 g,  "Rfl: 2    zolpidem (AMBIEN) 10 mg tablet, Take 1 tablet (10 mg total) by mouth daily at bedtime as needed for sleep, Disp: 30 tablet, Rfl: 0    Allergies   Allergen Reactions    Pollen Extract Nasal Congestion       Physical Exam:    /70   Pulse 56   Ht 6' 2\" (1.88 m)   Wt 87.1 kg (192 lb)   BMI 24.65 kg/m²     Constitutional:normal, well developed, well nourished, alert, in no distress and non-toxic and no overt pain behavior.  Eyes:anicteric  HEENT:grossly intact  Neck:supple, symmetric, trachea midline and no masses   Pulmonary:even and unlabored  Cardiovascular:No edema or pitting edema present  Skin:Normal without rashes or lesions and well hydrated  Psychiatric:Mood and affect appropriate  Neurologic:Cranial Nerves II-XII grossly intact  Musculoskeletal:normal gait, forward flexed posture, tenderness to palpation bilateral lumbar paraspinals, MMT unchanged from previous evaluation.      Imaging  MRI lumbar spine wo contrast    (Results Pending)         Orders Placed This Encounter   Procedures    MRI lumbar spine wo contrast     "

## 2024-08-13 NOTE — PROGRESS NOTES
Pain Medicine Follow-Up Note    Assessment:  1. Spinal stenosis of lumbar region with neurogenic claudication    2. Lumbar radiculopathy    3. Chronic midline low back pain with left-sided sciatica        Plan:  Mr. Ceron is a pleasant 78-year-old male significant past medical history of lumbar spinal stenosis with neurogenic claudication presents to [facility] for follow-up and reevaluation.  He was being followed by Dr. Hurt and then last underwent a L5-S1 LESI August 2023 with me with significant nearly 100% relief in his pain for over 9 to 10 months and a gradual return of symptoms.  Reports his symptoms are similar to previously and impacting his quality of life and activities of daily living.  At this time we will plan for repeat L5-S1 LESI under fluoroscopy guidance.  Will also order updated images as the last MRI and x-ray were conducted over 2 years ago.  All questions answered, patient is agreeable with plan.    Complete risks and benefits including bleeding, infection, tissue reaction, nerve injury and allergic reaction were discussed. The approach was demonstrated using models and literature was provided. Verbal and written consent was obtained.    History of Present Illness:    Lane Ceron is a 78 y.o. male who presents to [facility] for interval re-evaluation of the above stated pain complaints. The patient has a past medical and chronic pain history as outlined in the assessment section.  Patient presents to [facility] and pain [facility] for follow-up, reevaluation and transfer of care regarding ongoing low back pain with rating symptoms into the right lower extremity.  He was previously seen by Dr. Hurt and had undergone an L5-S1 LESI with significant improvement in his pain and a gradual return of symptoms.  Today reports 7 out of 10 pain described as a constant sharp, pressure-like, throbbing sensation that is worse in the  morning.  Presents today for follow-up and reevaluation.    Other than as stated above, the patient denies any interval changes in medications, medical condition, mental condition, symptoms, or allergies since the last office visit.         Review of Systems:    Review of Systems   Constitutional:  Negative for unexpected weight change.   HENT:  Negative for ear pain.    Eyes:  Negative for visual disturbance.   Respiratory:  Negative for shortness of breath and wheezing.    Gastrointestinal:  Negative for abdominal pain.   Musculoskeletal:  Positive for back pain and gait problem.        Decreased ROM, joint and muscle weakness   Neurological:  Negative for weakness and numbness.   Psychiatric/Behavioral:  Negative for decreased concentration.          Past Medical History:   Diagnosis Date    Arthritis 2010    Asthma 2010    Cataract     resolved: 7/20/2016    Glaucoma     Sciatica, right side     last assessed: 7/20/2016       Past Surgical History:   Procedure Laterality Date    EPIDURAL BLOCK INJECTION N/A 5/5/2023    Procedure: BLOCK / INJECTION EPIDURAL STEROID LUMBAR L4-L5 LESI;  Surgeon: Osman Erickson MD;  Location: [facility] MAIN OR;  Service: Pain Management     EPIDURAL BLOCK INJECTION N/A 8/30/2023    Procedure: L5-S1 LUMBAR epidural steroid injection (83835);  Surgeon: Blane Wagner DO;  Location: [facility] MAIN OR;  Service: Pain Management     LUMBAR EPIDURAL INJECTION N/A 8/11/2022    Procedure: L5 S1 LUMBAR epidural steroid injection ([id number]);  Surgeon: Osman Erickson MD;  Location: [facility] MAIN OR;  Service: Pain Management     LUMBAR EPIDURAL INJECTION N/A 9/30/2022    Procedure: L5 S1 LUMBAR epidural steroid injection ( [id number]);  Surgeon: Osman Erickson MD;  Location: [facility] MAIN OR;  Service: Pain Management     LUMBAR EPIDURAL INJECTION N/A 1/4/2023    Procedure: L5 S1 LUMBAR epidural steroid injection ([id number]);  Surgeon: Blane Wagner DO;  Location: [facility] MAIN OR;  Service: Pain  Management     TONSILLECTOMY         Family History   Problem Relation Age of Onset    Coronary artery disease Mother     Cancer Mother     Coronary artery disease Father     Substance Abuse Daughter     Mental illness Neg Hx        Social History     Occupational History    Not on file   Tobacco Use    Smoking status: Former     Current packs/day: 0.00     Average packs/day: 2.0 packs/day for 20.0 years (40.0 ttl pk-yrs)     Types: Cigarettes     Start date: 1966     Quit date: 1980     Years since quittin.6     Passive exposure: Past    Smokeless tobacco: Never   Vaping Use    Vaping status: Never Used   Substance and Sexual Activity    Alcohol use: Not Currently    Drug use: Never    Sexual activity: Not Currently     Partners: Female         Current Outpatient Medications:     ALPRAZolam (XANAX) 0.5 mg tablet, Take 1 tablet (0.5 mg total) by mouth daily at bedtime as needed for anxiety, Disp: 30 tablet, Rfl: 0    aspirin (ECOTRIN LOW STRENGTH) 81 mg EC tablet, Take 81 mg by mouth daily, Disp: , Rfl:     finasteride (PROSCAR) 5 mg tablet, Take 5 mg by mouth daily, Disp: , Rfl:     Mirabegron ER 50 MG TB24, Take 1 tablet (50 mg total) by mouth in the morning, Disp: 30 tablet, Rfl: 3    mometasone (Asmanex, 120 Metered Doses,) 220 mcg/actuation inhaler, Inhale 1 puff every evening Rinse mouth after use., Disp: 1 each, Rfl: 5    multivitamin (THERAGRAN) TABS, Take 1 tablet by mouth daily, Disp: , Rfl:     nabumetone (RELAFEN) 750 mg tablet, Take 1 tablet (750 mg total) by mouth 2 (two) times a day, Disp: 180 tablet, Rfl: 1    Probiotic Product (PROBIOTIC PO), Take by mouth, Disp: , Rfl:     rosuvastatin (CRESTOR) 20 MG tablet, Take 1 tablet (20 mg total) by mouth daily, Disp: 90 tablet, Rfl: 1    traZODone (DESYREL) 100 mg tablet, Take 2 tablets (200 mg total) by mouth daily at bedtime, Disp: 60 tablet, Rfl: 5    triamcinolone (KENALOG) 0.1 % ointment, APPLY TOPICALLY 2 (TWO) TIMES A DAY, Disp: 80 g,  "Rfl: 2    zolpidem (AMBIEN) 10 mg tablet, Take 1 tablet (10 mg total) by mouth daily at bedtime as needed for sleep, Disp: 30 tablet, Rfl: 0    Allergies   Allergen Reactions    Pollen Extract Nasal Congestion       Physical Exam:    /70   Pulse 56   Ht 6' 2\" (1.88 m)   Wt 87.1 kg (192 lb)   BMI 24.65 kg/m²     Constitutional:normal, well developed, well nourished, alert, in no distress and non-toxic and no overt pain behavior.  Eyes:anicteric  HEENT:grossly intact  Neck:supple, symmetric, trachea midline and no masses   Pulmonary:even and unlabored  Cardiovascular:No edema or pitting edema present  Skin:Normal without rashes or lesions and well hydrated  Psychiatric:Mood and affect appropriate  Neurologic:Cranial Nerves II-XII grossly intact  Musculoskeletal:normal gait, forward flexed posture, tenderness to palpation bilateral lumbar paraspinals, MMT unchanged from previous evaluation.      Imaging  MRI lumbar spine wo contrast    (Results Pending)         Orders Placed This Encounter   Procedures    MRI lumbar spine wo contrast     "

## 2024-08-14 ENCOUNTER — TELEPHONE (OUTPATIENT)
Age: 79
End: 2024-08-14

## 2024-08-22 DIAGNOSIS — G47.9 DISTURBANCE OF SLEEP: ICD-10-CM

## 2024-08-22 RX ORDER — ALPRAZOLAM 0.5 MG
0.5 TABLET ORAL
Qty: 30 TABLET | Refills: 0 | Status: CANCELLED | OUTPATIENT
Start: 2024-08-22

## 2024-08-22 RX ORDER — ZOLPIDEM TARTRATE 10 MG/1
10 TABLET ORAL
Qty: 30 TABLET | Refills: 0 | Status: CANCELLED | OUTPATIENT
Start: 2024-08-22

## 2024-08-23 DIAGNOSIS — G47.9 DISTURBANCE OF SLEEP: ICD-10-CM

## 2024-08-27 RX ORDER — ZOLPIDEM TARTRATE 10 MG/1
10 TABLET ORAL
Qty: 30 TABLET | Refills: 1 | Status: SHIPPED | OUTPATIENT
Start: 2024-08-27

## 2024-08-28 DIAGNOSIS — G47.9 DISTURBANCE OF SLEEP: ICD-10-CM

## 2024-08-28 DIAGNOSIS — R35.0 URINARY FREQUENCY: Primary | ICD-10-CM

## 2024-08-28 RX ORDER — ALPRAZOLAM 0.5 MG
0.5 TABLET ORAL
Qty: 30 TABLET | Refills: 0 | Status: SHIPPED | OUTPATIENT
Start: 2024-08-28

## 2024-08-28 NOTE — TELEPHONE ENCOUNTER
Reason for call:   [x] Refill   [] Prior Auth  [] Other:     Office:   [x] PCP/Provider -   [] Specialty/Provider -     Medication: finasteride (PROSCAR) 5 mg 1 tablet daily     Pharmacy: [facility]     Does the patient have enough for 3 days?   [x] Yes   [] No - Send as HP to POD

## 2024-08-29 RX ORDER — FINASTERIDE 5 MG/1
5 TABLET, FILM COATED ORAL DAILY
Qty: 90 TABLET | Refills: 1 | Status: SHIPPED | OUTPATIENT
Start: 2024-08-29

## 2024-08-29 NOTE — TELEPHONE ENCOUNTER
Requested medication(s) are due for refill today: UNKNOWN  Patient has already received a courtesy refill: No  Other reason request has been forwarded to provider: The requested medication is not on the active medication list.

## 2024-09-04 ENCOUNTER — APPOINTMENT (OUTPATIENT)
Dept: RADIOLOGY | Facility: HOSPITAL | Age: 79
End: 2024-09-04
Payer: COMMERCIAL

## 2024-09-04 ENCOUNTER — HOSPITAL ENCOUNTER (OUTPATIENT)
Facility: AMBULARY SURGERY CENTER | Age: 79
Setting detail: OUTPATIENT SURGERY
Discharge: HOME | End: 2024-09-04
Attending: PHYSICAL MEDICINE & REHABILITATION | Admitting: PHYSICAL MEDICINE & REHABILITATION
Payer: COMMERCIAL

## 2024-09-04 ENCOUNTER — VBI (OUTPATIENT)
Dept: ADMINISTRATIVE | Facility: OTHER | Age: 79
End: 2024-09-04

## 2024-09-04 VITALS
BODY MASS INDEX: 24.39 KG/M2 | HEART RATE: 52 BPM | OXYGEN SATURATION: 97 % | WEIGHT: 190 LBS | TEMPERATURE: 96.8 F | DIASTOLIC BLOOD PRESSURE: 67 MMHG | SYSTOLIC BLOOD PRESSURE: 139 MMHG | RESPIRATION RATE: 18 BRPM

## 2024-09-04 PROCEDURE — NC001 PR NO CHARGE: Performed by: PHYSICAL MEDICINE & REHABILITATION

## 2024-09-04 PROCEDURE — 62323 NJX INTERLAMINAR LMBR/SAC: CPT | Performed by: PHYSICAL MEDICINE & REHABILITATION

## 2024-09-04 RX ORDER — LIDOCAINE HYDROCHLORIDE 10 MG/ML
INJECTION, SOLUTION EPIDURAL; INFILTRATION; INTRACAUDAL; PERINEURAL AS NEEDED
Status: DISCONTINUED | OUTPATIENT
Start: 2024-09-04 | End: 2024-09-04 | Stop reason: HOSPADM

## 2024-09-04 RX ORDER — METHYLPREDNISOLONE ACETATE 80 MG/ML
INJECTION, SUSPENSION INTRA-ARTICULAR; INTRALESIONAL; INTRAMUSCULAR; SOFT TISSUE AS NEEDED
Status: DISCONTINUED | OUTPATIENT
Start: 2024-09-04 | End: 2024-09-04 | Stop reason: HOSPADM

## 2024-09-04 NOTE — TELEPHONE ENCOUNTER
09/04/24 9:19 AM    Patient contacted to bring Advance Directive, POLST, or Living Will document to next scheduled pcp visit.[facility] Department spoke with patient/ caregiver.    Thank you.  CAROLANN MOORE MA  [facility]

## 2024-09-04 NOTE — DISCHARGE INSTRUCTIONS
Epidural Steroid Injection   WHAT YOU NEED TO KNOW:   An epidural steroid injection (MARVIN) is a procedure to inject steroid medicine into the epidural space. The epidural space is between your spinal cord and vertebrae. Steroids reduce inflammation and fluid buildup in your spine that may be causing pain. You may be given pain medicine along with the steroids.          ACTIVITY  Do not drive or operate machinery today.  No strenuous activity today - bending, lifting, etc.  You may resume normal activites starting tomorrow - start slowly and as tolerated.  You may shower today, but no tub baths or hot tubs.  You may have numbness for several hours from the local anesthetic. Please use caution and common sense, especially with weight-bearing activities.    CARE OF THE INJECTION SITE  If you have soreness or pain, apply ice to the area today (20 minutes on/20 minutes off).  Starting tomorrow, you may use warm, moist heat or ice if needed.  You may have an increase or change in your discomfort for 36-48 hours after your treatment.  Apply ice and continue with any pain medication you have been prescribed.  Notify the [facility] if you have any of the following: redness, drainage, swelling, headache, stiff neck or fever above 100°F.    SPECIAL INSTRUCTIONS  Our office will contact you in approximately 14 days for a progress report.    MEDICATIONS  Continue to take all routine medications.  Our office may have instructed you to hold some medications.    As no general anesthesia was used in today's procedure, you should not experience any side effects related to anesthesia.     If you are diabetic, the steroids used in today's injection may temporarily increase your blood sugar levels after the first few days after your injection. Please keep a close eye on your sugars and alert the doctor who manages your diabetes if your sugars are significantly high from your baseline or you are symptomatic.     If you have a  problem specifically related to your procedure, please call our office at [phone].  Problems not related to your procedure should be directed to your primary care physician.

## 2024-09-04 NOTE — INTERVAL H&P NOTE
H&P reviewed. After examining the patient I find no changes in the patients condition since the H&P had been written.    Vitals:    09/04/24 0943   BP: 144/70   Pulse: 71   Resp: 18   Temp: (!) 96.8 °F (36 °C)   SpO2: 97%

## 2024-09-04 NOTE — OP NOTE
OPERATIVE REPORT  PATIENT NAME: Lane Ceron    :  1945  MRN: [id number]  Pt Location: [facility] MINOR/PAIN ROOM 01    SURGERY DATE: 2024    Surgeons and Role:     * Blane Wagner DO - Primary    Preop Diagnosis:  Lumbar radiculopathy [M54.16]    Post-Op Diagnosis Codes:     * Lumbar radiculopathy [M54.16]    Procedure(s):  L5-S1 LUMBAR EPIDURAL STEROID INJECTION    Reason for procedure:  Back and radiating pain     Preop diagnosis: lumbar radiculitis   Post op diagnosis: lumbar radiculitis    Procedure: Fluoroscopically-guided Right L5-S1 interlaminar epidural steroid injection under fluoroscopy    After discussing the risks, benefits, and alternatives to the procedure, the patient expressed understanding and wished to proceed.  The patient was brought to the fluoroscopy suite and placed in the prone position.  A procedural pause was conducted to verify:  correct patient identity, procedure to be performed and as applicable, correct side and site, correct patient position, and availability of implants, special equipment and special requirements.  After identifying the L5-S1] space fluoroscopically, the skin was sterilely prepped and draped in the usual fashion using Chloraprep skin prep.  The skin and subcutaneous tissues were anesthetized with 1% lidocaine.  Utilizing a loss of resistance technique and intermittent fluoroscopic guidance, a 3.5 inch 20 gaugeTuohy needle was advanced into the epidural space.  Proper needle positioning was confirmed using multiple fluoroscopic views.  After negative aspiration, Omnipaque 240 contrast was injected confirming epidural spread without evidence of intravascular or intrathecal spread.  A 4 ml solution consisting of 80 mg of Depo-Medrol in sterile saline was injected slowly and incrementally into the epidural space.  Following the injection the needle was withdrawn.  The patient tolerated the procedure well and there were no apparent complications.   After appropriate observation, the patient was dismissed from the clinic in good condition under their own power.    EBL: none  Specimens: not avaialble            SIGNATURE: Hernando Miller DO  DATE: September 4, 2024  TIME: 10:54 AM

## 2024-09-07 ENCOUNTER — HOSPITAL ENCOUNTER (OUTPATIENT)
Dept: RADIOLOGY | Facility: HOSPITAL | Age: 79
Discharge: HOME | End: 2024-09-07
Attending: PHYSICAL MEDICINE & REHABILITATION
Payer: COMMERCIAL

## 2024-09-07 DIAGNOSIS — M54.16 LUMBAR RADICULOPATHY: ICD-10-CM

## 2024-09-07 DIAGNOSIS — M54.42 CHRONIC MIDLINE LOW BACK PAIN WITH LEFT-SIDED SCIATICA: ICD-10-CM

## 2024-09-07 DIAGNOSIS — G89.29 CHRONIC MIDLINE LOW BACK PAIN WITH LEFT-SIDED SCIATICA: ICD-10-CM

## 2024-09-07 DIAGNOSIS — M48.062 SPINAL STENOSIS OF LUMBAR REGION WITH NEUROGENIC CLAUDICATION: ICD-10-CM

## 2024-09-07 PROCEDURE — 72148 MRI LUMBAR SPINE W/O DYE: CPT

## 2024-09-09 ENCOUNTER — OFFICE VISIT (OUTPATIENT)
Dept: FAMILY MEDICINE CLINIC | Facility: CLINIC | Age: 79
End: 2024-09-09
Payer: COMMERCIAL

## 2024-09-09 VITALS
DIASTOLIC BLOOD PRESSURE: 70 MMHG | BODY MASS INDEX: 24.77 KG/M2 | SYSTOLIC BLOOD PRESSURE: 152 MMHG | HEART RATE: 62 BPM | OXYGEN SATURATION: 99 % | TEMPERATURE: 97.3 F | RESPIRATION RATE: 16 BRPM | HEIGHT: 74 IN | WEIGHT: 193 LBS

## 2024-09-09 DIAGNOSIS — G47.9 DISTURBANCE OF SLEEP: ICD-10-CM

## 2024-09-09 DIAGNOSIS — Z12.11 COLON CANCER SCREENING: ICD-10-CM

## 2024-09-09 DIAGNOSIS — I10 PRIMARY HYPERTENSION: Primary | ICD-10-CM

## 2024-09-09 DIAGNOSIS — E78.2 MIXED HYPERLIPIDEMIA: ICD-10-CM

## 2024-09-09 DIAGNOSIS — Z12.5 ENCOUNTER FOR PROSTATE CANCER SCREENING: ICD-10-CM

## 2024-09-09 DIAGNOSIS — Z00.00 MEDICARE ANNUAL WELLNESS VISIT, SUBSEQUENT: ICD-10-CM

## 2024-09-09 DIAGNOSIS — J45.20 MILD INTERMITTENT ASTHMA WITHOUT COMPLICATION: ICD-10-CM

## 2024-09-09 DIAGNOSIS — M54.42 CHRONIC MIDLINE LOW BACK PAIN WITH LEFT-SIDED SCIATICA: ICD-10-CM

## 2024-09-09 DIAGNOSIS — Z13.29 SCREENING FOR HYPOTHYROIDISM: ICD-10-CM

## 2024-09-09 DIAGNOSIS — M48.062 SPINAL STENOSIS OF LUMBAR REGION WITH NEUROGENIC CLAUDICATION: ICD-10-CM

## 2024-09-09 DIAGNOSIS — G89.29 CHRONIC MIDLINE LOW BACK PAIN WITH LEFT-SIDED SCIATICA: ICD-10-CM

## 2024-09-09 DIAGNOSIS — M15.9 GENERALIZED OSTEOARTHRITIS OF MULTIPLE SITES: ICD-10-CM

## 2024-09-09 PROCEDURE — 99214 OFFICE O/P EST MOD 30 MIN: CPT | Performed by: FAMILY MEDICINE

## 2024-09-09 PROCEDURE — G0439 PPPS, SUBSEQ VISIT: HCPCS | Performed by: FAMILY MEDICINE

## 2024-09-09 PROCEDURE — 93000 ELECTROCARDIOGRAM COMPLETE: CPT | Performed by: FAMILY MEDICINE

## 2024-09-09 NOTE — PROGRESS NOTES
Ambulatory Visit  Name: Lane Ceron      : 1945      MRN: [id number]  Encounter Provider: Giovanny Cook MD  Encounter Date: 2024   Encounter department: [facility]    Assessment & Plan   1. Primary hypertension  Assessment & Plan:  STABLE  DENIES ANY CP, SOB, PALPITATIONS, OR HEADACHE  NOTES NO WATER RETENTION  COMPLIANT WITH MEDICATION  NO CONCERNS    - CONTINUE CURRENT TREATMENT PLAN  - MONITOR DIETARY SODIUM INTAKE  - ENCOURAGE PHYSICAL ACTIVITY  - RV 3 MONTHS    Orders:  -     POCT ECG  2. Mixed hyperlipidemia  Assessment & Plan:  WATCHING CHOLESTEROL INTAKE  COMPLIANT WITH MEDICATION  NO CONCERNS    - CONTINUE TO MONITOR DIETARY CHOL INTAKE  - CONTINUE CURRENT MEDICATION  - ENCOURAGED PHYSICAL ACTIVITY    3. Generalized osteoarthritis of multiple sites  Assessment & Plan:  STABLE  DENIES ANY JOINT SWELLING OR REDNESS  JOINT STIFFNESS PRESENT  PAIN MANAGEMENT ADEQUATE    - CONTINUE CURRENT MANAGEMENT  - MEDICATION AS DIRECTED  - CALL / RETURN IF SYMPTOMS WORSEN    4. Mild intermittent asthma without complication  Assessment & Plan:  STABLE    5. Spinal stenosis of lumbar region with neurogenic claudication  6. Chronic midline low back pain with left-sided sciatica  7. Disturbance of sleep  8. Screening for hypothyroidism  9. Encounter for prostate cancer screening  10. Colon cancer screening  11. Medicare annual wellness visit, subsequent       Preventive health issues were discussed with patient, and age appropriate screening tests were ordered as noted in patient's After Visit Summary. Personalized health advice and appropriate referrals for health education or preventive services given if needed, as noted in patient's After Visit Summary.    History of Present Illness     DISCUSSED HEALTH ISSUES  REVIEWED MEDICAL RECORD  NO CONCERNS AT THIS TIME           Patient Care Team:  Giovanny Cook MD as PCP - General (Family Medicine)  Junior Haley MD  (Urology)    Review of Systems   Constitutional:  Negative for chills, fatigue and fever.   HENT:  Negative for congestion, ear discharge, ear pain, mouth sores, postnasal drip, sore throat and trouble swallowing.    Eyes:  Negative for pain, discharge and visual disturbance.   Respiratory:  Negative for cough, shortness of breath and wheezing.    Cardiovascular:  Negative for chest pain, palpitations and leg swelling.   Gastrointestinal:  Negative for abdominal distention, abdominal pain, blood in stool, diarrhea and nausea.   Endocrine: Negative for polydipsia, polyphagia and polyuria.   Genitourinary:  Negative for dysuria, frequency, hematuria and urgency.   Musculoskeletal:  Negative for arthralgias, gait problem and joint swelling.   Skin:  Negative for pallor and rash.   Neurological:  Negative for dizziness, syncope, speech difficulty, weakness, light-headedness, numbness and headaches.   Hematological:  Negative for adenopathy.   Psychiatric/Behavioral:  Negative for behavioral problems, confusion and sleep disturbance. The patient is not nervous/anxious.      Medical History Reviewed by provider this encounter:  Tobacco  Allergies  Meds  Problems  Med Hx  Surg Hx  Fam Hx       Annual Wellness Visit Questionnaire   Lane is here for his Subsequent Wellness visit.     Health Risk Assessment:   Patient rates overall health as very good. Patient feels that their physical health rating is same. Patient is very satisfied with their life. Eyesight was rated as same. Hearing was rated as same. Patient feels that their emotional and mental health rating is same. Patients states they are never, rarely angry. Patient states they are sometimes unusually tired/fatigued. Pain experienced in the last 7 days has been some. Patient's pain rating has been 5/10. Patient states that he has experienced no weight loss or gain in last 6 months.     Fall Risk Screening:   In the past year, patient has experienced: no  history of falling in past year      Home Safety:  Patient does not have trouble with stairs inside or outside of their home. Patient has working smoke alarms and has working carbon monoxide detector. Home safety hazards include: none.     Nutrition:   Current diet is Regular, No Added Salt and Limited junk food.     Medications:   Patient is currently taking over-the-counter supplements. OTC medications include: see medication list. Patient is able to manage medications.     Activities of Daily Living (ADLs)/Instrumental Activities of Daily Living (IADLs):   Walk and transfer into and out of bed and chair?: Yes  Dress and groom yourself?: Yes    Bathe or shower yourself?: Yes    Feed yourself? Yes  Do your laundry/housekeeping?: Yes  Manage your money, pay your bills and track your expenses?: Yes  Make your own meals?: Yes    Do your own shopping?: Yes    Previous Hospitalizations:   Any hospitalizations or ED visits within the last 12 months?: No      Advance Care Planning:   Living will: Yes    Durable POA for healthcare: Yes    Advanced directive: Yes      PREVENTIVE SCREENINGS      Cardiovascular Screening:    General: Screening Not Indicated and History Lipid Disorder      Diabetes Screening:     General: Screening Current      Prostate Cancer Screening:    General: Screening Not Indicated      Abdominal Aortic Aneurysm (AAA) Screening:    Risk factors include: tobacco use        Lung Cancer Screening:     General: Screening Not Indicated      Hepatitis C Screening:    General: Screening Current    Screening, Brief Intervention, and Referral to Treatment (SBIRT)    Screening  Typical number of drinks in a day: 0  Typical number of drinks in a week: 0  Interpretation: Low risk drinking behavior.    AUDIT-C Screenin) How often did you have a drink containing alcohol in the past year? never  2) How many drinks did you have on a typical day when you were drinking in the past year? 0  3) How often did you have  "6 or more drinks on one occasion in the past year? never    AUDIT-C Score: 0  Interpretation: Score 0-3 (male): Negative screen for alcohol misuse    Single Item Drug Screening:  How often have you used an illegal drug (including marijuana) or a prescription medication for non-medical reasons in the past year? never    Single Item Drug Screen Score: 0  Interpretation: Negative screen for possible drug use disorder    Social Determinants of Health     Financial Resource Strain: Low Risk  (8/31/2023)    Overall Financial Resource Strain (CARDIA)    • Difficulty of Paying Living Expenses: Not hard at all   Food Insecurity: No Food Insecurity (9/2/2024)    Hunger Vital Sign    • Worried About Running Out of Food in the Last Year: Never true    • Ran Out of Food in the Last Year: Never true   Transportation Needs: No Transportation Needs (9/2/2024)    PRAPARE - Transportation    • Lack of Transportation (Medical): No    • Lack of Transportation (Non-Medical): No   Housing Stability: Low Risk  (9/2/2024)    Housing Stability Vital Sign    • Unable to Pay for Housing in the Last Year: No    • Number of Times Moved in the Last Year: 0    • Homeless in the Last Year: No   Utilities: Not At Risk (9/2/2024)    [facility] Utilities    • Threatened with loss of utilities: No     No results found.    Objective     /70   Pulse 62   Temp (!) 97.3 °F (36.3 °C) (Temporal)   Resp 16   Ht 6' 2\" (1.88 m)   Wt 87.5 kg (193 lb)   SpO2 99%   BMI 24.78 kg/m²     Physical Exam  Constitutional:       General: He is not in acute distress.     Appearance: Normal appearance. He is well-developed and normal weight. He is not ill-appearing or diaphoretic.   HENT:      Head: Normocephalic and atraumatic.      Right Ear: Tympanic membrane and external ear normal.      Left Ear: Tympanic membrane and external ear normal.      Nose: Nose normal.      Mouth/Throat:      Mouth: Mucous membranes are moist.      Pharynx: No oropharyngeal exudate. "   Eyes:      General: No scleral icterus.        Right eye: No discharge.         Left eye: No discharge.      Conjunctiva/sclera: Conjunctivae normal.      Pupils: Pupils are equal, round, and reactive to light.   Neck:      Thyroid: No thyromegaly.      Vascular: No JVD.      Trachea: No tracheal deviation.   Cardiovascular:      Rate and Rhythm: Normal rate and regular rhythm.      Heart sounds: Normal heart sounds. No murmur heard.     No friction rub. No gallop.   Pulmonary:      Effort: Pulmonary effort is normal. No respiratory distress.      Breath sounds: Normal breath sounds. No stridor. No wheezing or rales.   Chest:      Chest wall: No tenderness.   Abdominal:      General: Bowel sounds are normal. There is no distension.      Palpations: Abdomen is soft. There is no mass.      Tenderness: There is no abdominal tenderness. There is no guarding or rebound.      Hernia: No hernia is present.   Genitourinary:     Penis: Normal. No tenderness.       Testes: Normal.      Prostate: Normal.      Rectum: Normal. Guaiac result negative.   Musculoskeletal:         General: No tenderness or deformity. Normal range of motion.      Cervical back: Normal range of motion and neck supple.      Comments: Mild djd changes     Lymphadenopathy:      Cervical: No cervical adenopathy.   Skin:     General: Skin is warm and dry.      Coloration: Skin is not pale.      Findings: No erythema or rash.   Neurological:      General: No focal deficit present.      Mental Status: He is alert and oriented to person, place, and time.      Cranial Nerves: No cranial nerve deficit.      Sensory: No sensory deficit.      Motor: No weakness or abnormal muscle tone.      Coordination: Coordination normal.      Gait: Gait normal.      Deep Tendon Reflexes: Reflexes normal.   Psychiatric:         Mood and Affect: Mood normal.         Behavior: Behavior normal.         Thought Content: Thought content normal.         Judgment: Judgment normal.

## 2024-09-09 NOTE — PATIENT INSTRUCTIONS
DISCUSSED HEALTH MAINTENENCE ISSUES  BW WILL BE REVIEWED  ENCOURAGED HEALTHY DIET AND EXERCISE  COLONOSCOPY WILL BE ORDERED  RV FOR ANNUAL HEALTH EXAM IN 1 YEAR  RV SOONER IF THERE ARE ANY CONCERNS      RV 6M        Medicare Preventive Visit Patient Instructions  Thank you for completing your Welcome to Medicare Visit or Medicare Annual Wellness Visit today. Your next wellness visit will be due in one year (9/10/2025).  The screening/preventive services that you may require over the next 5-10 years are detailed below. Some tests may not apply to you based off risk factors and/or age. Screening tests ordered at today's visit but not completed yet may show as past due. Also, please note that scanned in results may not display below.  Preventive Screenings:  Service Recommendations Previous Testing/Comments   Colorectal Cancer Screening  Colonoscopy    Fecal Occult Blood Test (FOBT)/Fecal Immunochemical Test (FIT)  Fecal DNA/Cologuard Test  Flexible Sigmoidoscopy Age: 45-75 years old   Colonoscopy: every 10 years (May be performed more frequently if at higher risk)  OR  FOBT/FIT: every 1 year  OR  Cologuard: every 3 years  OR  Sigmoidoscopy: every 5 years  Screening may be recommended earlier than age 45 if at higher risk for colorectal cancer. Also, an individualized decision between you and your healthcare provider will decide whether screening between the ages of 76-85 would be appropriate. Colonoscopy: 01/02/2020  FOBT/FIT: Not on file  Cologuard: Not on file  Sigmoidoscopy: Not on file          Prostate Cancer Screening Individualized decision between patient and health care provider in men between ages of 55-69   Medicare will cover every 12 months beginning on the day after your 50th birthday PSA: 0.2 ng/mL     Screening Not Indicated     Hepatitis C Screening Once for adults born between 1945 and 1965  More frequently in patients at high risk for Hepatitis C Hep C Antibody: 08/16/2021    Screening Current    Diabetes Screening 1-2 times per year if you're at risk for diabetes or have pre-diabetes Fasting glucose: No results in last 5 years (No results in last 5 years)  A1C: No results in last 5 years (No results in last 5 years)  Screening Current   Cholesterol Screening Once every 5 years if you don't have a lipid disorder. May order more often based on risk factors. Lipid panel: 02/19/2024  Screening Not Indicated  History Lipid Disorder      Other Preventive Screenings Covered by Medicare:  Abdominal Aortic Aneurysm (AAA) Screening: covered once if your at risk. You're considered to be at risk if you have a family history of AAA or a male between the age of 65-75 who smoking at least 100 cigarettes in your lifetime.  Lung Cancer Screening: covers low dose CT scan once per year if you meet all of the following conditions: (1) Age 55-77; (2) No signs or symptoms of lung cancer; (3) Current smoker or have quit smoking within the last 15 years; (4) You have a tobacco smoking history of at least 20 pack years (packs per day x number of years you smoked); (5) You get a written order from a healthcare provider.  Glaucoma Screening: covered annually if you're considered high risk: (1) You have diabetes OR (2) Family history of glaucoma OR (3)  aged 50 and older OR (4)  American aged 65 and older  Osteoporosis Screening: covered every 2 years if you meet one of the following conditions: (1) Have a vertebral abnormality; (2) On glucocorticoid therapy for more than 3 months; (3) Have primary hyperparathyroidism; (4) On osteoporosis medications and need to assess response to drug therapy.  HIV Screening: covered annually if you're between the age of 15-65. Also covered annually if you are younger than 15 and older than 65 with risk factors for HIV infection. For pregnant patients, it is covered up to 3 times per pregnancy.    Immunizations:  Immunization Recommendations   Influenza Vaccine Annual  influenza vaccination during flu season is recommended for all persons aged >= 6 months who do not have contraindications   Pneumococcal Vaccine   * Pneumococcal conjugate vaccine = PCV13 (Prevnar 13), PCV15 (Vaxneuvance), PCV20 (Prevnar 20)  * Pneumococcal polysaccharide vaccine = PPSV23 (Pneumovax) Adults 19-63 yo with certain risk factors or if 65+ yo  If never received any pneumonia vaccine: recommend Prevnar 20 (PCV20)  Give PCV20 if previously received 1 dose of PCV13 or PPSV23   Hepatitis B Vaccine 3 dose series if at intermediate or high risk (ex: diabetes, end stage renal disease, liver disease)   Respiratory syncytial virus (RSV) Vaccine - COVERED BY MEDICARE PART D  * RSVPreF3 (Arexvy) CDC recommends that adults 60 years of age and older may receive a single dose of RSV vaccine using shared clinical decision-making (SCDM)   Tetanus (Td) Vaccine - COST NOT COVERED BY MEDICARE PART B Following completion of primary series, a booster dose should be given every 10 years to maintain immunity against tetanus. Td may also be given as tetanus wound prophylaxis.   Tdap Vaccine - COST NOT COVERED BY MEDICARE PART B Recommended at least once for all adults. For pregnant patients, recommended with each pregnancy.   Shingles Vaccine (Shingrix) - COST NOT COVERED BY MEDICARE PART B  2 shot series recommended in those 19 years and older who have or will have weakened immune systems or those 50 years and older     Health Maintenance Due:      Topic Date Due   • Hepatitis C Screening  Completed   • Colorectal Cancer Screening  Discontinued     Immunizations Due:      Topic Date Due   • Pneumococcal Vaccine: 65+ Years (2 of 2 - PPSV23 or PCV20) 10/20/2016   • COVID-19 Vaccine (4 - 2023-24 season) 09/01/2024   • Influenza Vaccine (1) 09/01/2024     Advance Directives   What are advance directives?  Advance directives are legal documents that state your wishes and plans for medical care. These plans are made ahead of time in  case you lose your ability to make decisions for yourself. Advance directives can apply to any medical decision, such as the treatments you want, and if you want to donate organs.   What are the types of advance directives?  There are many types of advance directives, and each state has rules about how to use them. You may choose a combination of any of the following:  Living will:  This is a written record of the treatment you want. You can also choose which treatments you do not want, which to limit, and which to stop at a certain time. This includes surgery, medicine, IV fluid, and tube feedings.   Durable power of  for healthcare (DPAHC):  This is a written record that states who you want to make healthcare choices for you when you are unable to make them for yourself. This person, called a proxy, is usually a family member or a friend. You may choose more than 1 proxy.  Do not resuscitate (DNR) order:  A DNR order is used in case your heart stops beating or you stop breathing. It is a request not to have certain forms of treatment, such as CPR. A DNR order may be included in other types of advance directives.  Medical directive:  This covers the care that you want if you are in a coma, near death, or unable to make decisions for yourself. You can list the treatments you want for each condition. Treatment may include pain medicine, surgery, blood transfusions, dialysis, IV or tube feedings, and a ventilator (breathing machine).  Values history:  This document has questions about your views, beliefs, and how you feel and think about life. This information can help others choose the care that you would choose.  Why are advance directives important?  An advance directive helps you control your care. Although spoken wishes may be used, it is better to have your wishes written down. Spoken wishes can be misunderstood, or not followed. Treatments may be given even if you do not want them. An advance directive  may make it easier for your family to make difficult choices about your care.       © Copyright [employer] 2018 Information is for End User's use only and may not be sold, redistributed or otherwise used for commercial purposes. All illustrations and images included in CareNotes® are the copyrighted property of [employer] or [employer]

## 2024-09-10 ENCOUNTER — TELEPHONE (OUTPATIENT)
Dept: PAIN MEDICINE | Facility: CLINIC | Age: 79
End: 2024-09-10

## 2024-09-10 NOTE — TELEPHONE ENCOUNTER
----- Message from Blane Wagner DO sent at 9/10/2024 10:57 AM EDT -----  Please notify patient MRI lumbar spine results demonstrating moderate to severe central and bilateral foraminal stenosis most notable at L4-L5  We just performed an L5-S1 LESI September 4 so hopefully he is starting to feel relief from the recent epidural  Thank you  ----- Message -----  From: Interface, Radiology Results In  Sent: 9/9/2024  12:48 PM EDT  To: Blane Wagner DO

## 2024-09-10 NOTE — TELEPHONE ENCOUNTER
S/w pt and advised of same. Pt verbalized understanding. Pt is starting to have relief from his epidural.

## 2024-09-18 ENCOUNTER — TELEPHONE (OUTPATIENT)
Dept: PAIN MEDICINE | Facility: CLINIC | Age: 79
End: 2024-09-18

## 2024-09-18 NOTE — TELEPHONE ENCOUNTER
Caller: Cristhian  Doctor/office: ROMULO AHUMADA#: [phone]    % of improvement: 90%  Pain Scale (1-10): 2/10

## 2024-09-24 DIAGNOSIS — G47.9 DISTURBANCE OF SLEEP: ICD-10-CM

## 2024-09-25 ENCOUNTER — CLINICAL SUPPORT (OUTPATIENT)
Dept: FAMILY MEDICINE CLINIC | Facility: CLINIC | Age: 79
End: 2024-09-25
Payer: COMMERCIAL

## 2024-09-25 DIAGNOSIS — Z23 ENCOUNTER FOR IMMUNIZATION: Primary | ICD-10-CM

## 2024-09-25 DIAGNOSIS — Z23 NEED FOR COVID-19 VACCINE: ICD-10-CM

## 2024-09-25 PROCEDURE — 90480 ADMN SARSCOV2 VAC 1/ONLY CMP: CPT

## 2024-09-25 PROCEDURE — G0008 ADMIN INFLUENZA VIRUS VAC: HCPCS

## 2024-09-25 PROCEDURE — 90662 IIV NO PRSV INCREASED AG IM: CPT

## 2024-09-25 PROCEDURE — 91320 SARSCV2 VAC 30MCG TRS-SUC IM: CPT

## 2024-09-25 RX ORDER — ALPRAZOLAM 0.5 MG
0.5 TABLET ORAL
Qty: 30 TABLET | Refills: 0 | Status: SHIPPED | OUTPATIENT
Start: 2024-09-25

## 2024-09-25 RX ORDER — ZOLPIDEM TARTRATE 10 MG/1
10 TABLET ORAL
Qty: 30 TABLET | Refills: 0 | Status: SHIPPED | OUTPATIENT
Start: 2024-09-25

## 2024-10-10 DIAGNOSIS — R35.0 URINARY FREQUENCY: ICD-10-CM

## 2024-10-10 NOTE — TELEPHONE ENCOUNTER
HISTORY OF PRESENT ILLNESS  Moris Langston is a 24 y.o. female brought by . HPI  Back Pain  Patient presents for concerns of upper back, lower neck pain. Symptoms have been present for 1-1.5 years and include pain in lower neck, upper back region (aching, sharp in character; 7/10 in severity at it's worse). Initial inciting event: none. Symptoms are worst: after standing for a long period of time-6 + hours, carrying something in both hands  If she bends forward after standing for a while she will feel a shock-like pain quick and will go away  Alleviating factors identifiable by patient are none. Tried wearing more supportive bras, improving her posture, strengthening neck muscles, rhomboids  Treatments so far initiated by patient: has tried Tylenol which helps a little   No previous treatment or evaluation      Also, she has additional complaints of cough past 3 days,no fever, no runny nose. Cough described as productive, no SOB  No ill contacts        Patient Active Problem List    Diagnosis Date Noted    BMI (body mass index), pediatric, 85% to less than 95% for age 07/17/2020    Acute pain of right knee 07/17/2020    Dermatitis, perioral 11/03/2018     Current Outpatient Medications   Medication Sig Dispense Refill    mupirocin (BACTROBAN) 2 % ointment Apply  to affected area three (3) times daily. 22 g 0    desonide (TRIDESILON) 0.05 % cream Apply  to affected area two (2) times a day. 60 g 2    multivitamin (ONE A DAY) tablet Take 1 Tab by mouth daily. Allergies   Allergen Reactions    Amoxicillin Rash     itching    Penicillins Hives       Review of Systems   Constitutional:  Negative for malaise/fatigue and weight loss. Gastrointestinal:  Negative for abdominal pain. Neurological:  Negative for dizziness, tremors and headaches. Psychiatric/Behavioral:  The patient does not have insomnia. All other systems reviewed and are negative.       Visit Vitals  /66 (BP 1 Location: Left Patient called Rx Refill Line inquiring refill on   [facility]. He reports not having enough medication to get through the weekend and is leaving Saturday for a week. Please send to pharmacy as soon as available.    upper arm, BP Patient Position: Sitting)   Pulse 97   Temp 98.7 °F (37.1 °C) (Oral)   Resp 20   Ht 5' 10.5\" (1.791 m)   Wt 228 lb (103.4 kg)   SpO2 97%   BMI 32.25 kg/m²       Physical Exam  Vitals and nursing note reviewed. Constitutional:       General: She is not in acute distress. Appearance: Normal appearance. HENT:      Right Ear: Tympanic membrane normal.      Left Ear: Tympanic membrane normal.      Nose: Congestion present. Mouth/Throat:      Mouth: Mucous membranes are moist.      Pharynx: Oropharynx is clear. Cardiovascular:      Rate and Rhythm: Normal rate and regular rhythm. Pulses: Normal pulses. Heart sounds: Normal heart sounds. Pulmonary:      Effort: Pulmonary effort is normal. No respiratory distress. Breath sounds: Wheezing present. Abdominal:      General: Abdomen is flat. Bowel sounds are normal.      Palpations: Abdomen is soft. Musculoskeletal:         General: Normal range of motion. Cervical back: Normal range of motion and neck supple. No tenderness or bony tenderness. Normal range of motion. Back:    Skin:     Findings: No rash. Neurological:      General: No focal deficit present. Mental Status: She is alert and oriented to person, place, and time. Results for orders placed or performed in visit on 08/03/22   POCT COVID-19, SARS-COV-2, PCR   Result Value Ref Range    SARS-COV-2 PCR, POC Negative Negative     ASSESSMENT and PLAN  Diagnoses and all orders for this visit:    1. Neck pain, chronic  -     XR SPINE CERV PA LAT ODONT 3 V MAX; Future    2. Acute bronchitis with bronchospasm  -     albuterol (PROVENTIL HFA, VENTOLIN HFA, PROAIR HFA) 90 mcg/actuation inhaler; Take 2 Puffs by inhalation every six (6) hours as needed for Wheezing.  -     azithromycin (ZITHROMAX) 250 mg tablet; Take 2 tablets po today and then 1 tablet po daily for days 2-5  -     POCT COVID-19, SARS-COV-2, PCR    3.  Cough  -     POCT COVID-19, SARS-COV-2, PCR     Chronic cervical neck pain  Musculoskeletal pain  Xray C spine  2. Cough   Advised patient COVID PCR returned negative  DDx:bronchitis, sinusitis  Exam consistent with bronchitis with bronchospasm  Start Zithromax   Albuterol inhaler    Supportive and comfort care include encouraging and increasing fluids, rest and fever reducers if needed. Please call us if symptoms persist for more than another 48 hours or if new symptoms develop or if you feel your child is not improving as expected. Follow-up and Dispositions    Return if symptoms worsen or fail to improve.

## 2024-10-10 NOTE — TELEPHONE ENCOUNTER
Requested medication(s) are due for refill today: Yes  Patient has already received a courtesy refill: No  Other reason request has been forwarded to provider:  He reports not having enough medication to get through the weekend and is leaving Saturday for a week. Please send to pharmacy as soon as available.

## 2024-10-11 RX ORDER — MIRABEGRON 50 MG/1
TABLET, FILM COATED, EXTENDED RELEASE ORAL
Qty: 30 TABLET | Refills: 3 | Status: SHIPPED | OUTPATIENT
Start: 2024-10-11

## 2024-10-21 DIAGNOSIS — E78.2 MIXED HYPERLIPIDEMIA: ICD-10-CM

## 2024-10-21 DIAGNOSIS — I25.10 ATHEROSCLEROSIS OF CORONARY ARTERY OF NATIVE HEART WITHOUT ANGINA PECTORIS, UNSPECIFIED VESSEL OR LESION TYPE: ICD-10-CM

## 2024-10-21 DIAGNOSIS — G47.9 DISTURBANCE OF SLEEP: ICD-10-CM

## 2024-10-21 DIAGNOSIS — R93.1 ELEVATED CORONARY ARTERY CALCIUM SCORE: ICD-10-CM

## 2024-10-22 RX ORDER — ALPRAZOLAM 0.5 MG
0.5 TABLET ORAL
Qty: 30 TABLET | Refills: 0 | Status: SHIPPED | OUTPATIENT
Start: 2024-10-22

## 2024-10-22 RX ORDER — ROSUVASTATIN CALCIUM 20 MG/1
20 TABLET, COATED ORAL DAILY
Qty: 90 TABLET | Refills: 1 | Status: SHIPPED | OUTPATIENT
Start: 2024-10-22

## 2024-10-22 RX ORDER — ZOLPIDEM TARTRATE 10 MG/1
10 TABLET ORAL
Qty: 30 TABLET | Refills: 0 | Status: SHIPPED | OUTPATIENT
Start: 2024-10-22

## 2024-10-22 NOTE — TELEPHONE ENCOUNTER
Requested medication(s) are due for refill today: Yes  Patient has already received a courtesy refill: No  Other reason request has been forwarded to provider: PDMP Review May Be Needed

## 2024-11-19 DIAGNOSIS — G47.9 DISTURBANCE OF SLEEP: ICD-10-CM

## 2024-11-22 RX ORDER — ALPRAZOLAM 0.5 MG
0.5 TABLET ORAL
Qty: 30 TABLET | Refills: 0 | Status: SHIPPED | OUTPATIENT
Start: 2024-11-22

## 2024-11-22 RX ORDER — ZOLPIDEM TARTRATE 10 MG/1
10 TABLET ORAL
Qty: 30 TABLET | Refills: 0 | Status: SHIPPED | OUTPATIENT
Start: 2024-11-22

## 2024-12-03 DIAGNOSIS — R35.0 URINARY FREQUENCY: ICD-10-CM

## 2024-12-04 RX ORDER — OXYBUTYNIN CHLORIDE 10 MG/1
10 TABLET, EXTENDED RELEASE ORAL DAILY
Qty: 30 TABLET | Refills: 3 | Status: SHIPPED | OUTPATIENT
Start: 2024-12-04

## 2024-12-13 DIAGNOSIS — G47.9 DISTURBANCE OF SLEEP: ICD-10-CM

## 2024-12-16 RX ORDER — ALPRAZOLAM 0.5 MG
0.5 TABLET ORAL
Qty: 30 TABLET | Refills: 0 | Status: SHIPPED | OUTPATIENT
Start: 2024-12-16

## 2024-12-16 RX ORDER — ZOLPIDEM TARTRATE 10 MG/1
10 TABLET ORAL
Qty: 30 TABLET | Refills: 0 | Status: SHIPPED | OUTPATIENT
Start: 2024-12-16

## 2024-12-20 DIAGNOSIS — M15.9 GENERALIZED OSTEOARTHRITIS OF MULTIPLE SITES: ICD-10-CM

## 2024-12-20 DIAGNOSIS — G47.9 DISTURBANCE OF SLEEP: ICD-10-CM

## 2024-12-23 RX ORDER — TRAZODONE HYDROCHLORIDE 100 MG/1
200 TABLET ORAL
Qty: 60 TABLET | Refills: 5 | Status: SHIPPED | OUTPATIENT
Start: 2024-12-23

## 2025-01-03 DIAGNOSIS — G47.9 DISTURBANCE OF SLEEP: ICD-10-CM

## 2025-01-03 NOTE — TELEPHONE ENCOUNTER
Requested medication(s) are due for refill today: No  Patient has already received a courtesy refill: Yes  Other reason request has been forwarded to provider: This refill cannot be delegated

## 2025-01-04 RX ORDER — ALPRAZOLAM 0.5 MG
0.5 TABLET ORAL
Qty: 30 TABLET | Refills: 0 | Status: SHIPPED | OUTPATIENT
Start: 2025-01-04

## 2025-01-17 DIAGNOSIS — R93.1 ELEVATED CORONARY ARTERY CALCIUM SCORE: ICD-10-CM

## 2025-01-17 DIAGNOSIS — G47.9 DISTURBANCE OF SLEEP: ICD-10-CM

## 2025-01-17 DIAGNOSIS — R35.0 URINARY FREQUENCY: ICD-10-CM

## 2025-01-17 DIAGNOSIS — I25.10 ATHEROSCLEROSIS OF CORONARY ARTERY OF NATIVE HEART WITHOUT ANGINA PECTORIS, UNSPECIFIED VESSEL OR LESION TYPE: ICD-10-CM

## 2025-01-17 DIAGNOSIS — M15.9 GENERALIZED OSTEOARTHRITIS OF MULTIPLE SITES: ICD-10-CM

## 2025-01-17 DIAGNOSIS — E78.2 MIXED HYPERLIPIDEMIA: ICD-10-CM

## 2025-01-17 RX ORDER — TRAZODONE HYDROCHLORIDE 100 MG/1
200 TABLET ORAL
Qty: 180 TABLET | Refills: 1 | Status: SHIPPED | OUTPATIENT
Start: 2025-01-17

## 2025-01-17 RX ORDER — ROSUVASTATIN CALCIUM 20 MG/1
20 TABLET, COATED ORAL DAILY
Qty: 90 TABLET | Refills: 1 | Status: SHIPPED | OUTPATIENT
Start: 2025-01-17

## 2025-01-17 RX ORDER — OXYBUTYNIN CHLORIDE 10 MG/1
10 TABLET, EXTENDED RELEASE ORAL DAILY
Qty: 90 TABLET | Refills: 1 | Status: SHIPPED | OUTPATIENT
Start: 2025-01-17

## 2025-01-20 RX ORDER — ALPRAZOLAM 0.5 MG
0.5 TABLET ORAL
Qty: 30 TABLET | Refills: 0 | OUTPATIENT
Start: 2025-01-20

## 2025-01-20 RX ORDER — ZOLPIDEM TARTRATE 10 MG/1
10 TABLET ORAL
Qty: 30 TABLET | Refills: 0 | Status: SHIPPED | OUTPATIENT
Start: 2025-01-20

## 2025-02-11 ENCOUNTER — OFFICE VISIT (OUTPATIENT)
Dept: FAMILY MEDICINE CLINIC | Facility: CLINIC | Age: 80
End: 2025-02-11
Payer: COMMERCIAL

## 2025-02-11 VITALS
RESPIRATION RATE: 18 BRPM | DIASTOLIC BLOOD PRESSURE: 78 MMHG | HEIGHT: 74 IN | TEMPERATURE: 95.8 F | WEIGHT: 195 LBS | BODY MASS INDEX: 25.03 KG/M2 | SYSTOLIC BLOOD PRESSURE: 128 MMHG | HEART RATE: 64 BPM

## 2025-02-11 DIAGNOSIS — R41.3 MEMORY IMPAIRMENT: Primary | ICD-10-CM

## 2025-02-11 PROCEDURE — G2211 COMPLEX E/M VISIT ADD ON: HCPCS | Performed by: FAMILY MEDICINE

## 2025-02-11 PROCEDURE — 99213 OFFICE O/P EST LOW 20 MIN: CPT | Performed by: FAMILY MEDICINE

## 2025-02-11 RX ORDER — MAGNESIUM 30 MG
30 TABLET ORAL
COMMUNITY

## 2025-02-11 RX ORDER — DIPHENHYDRAMINE HCL 50 MG
50 CAPSULE ORAL
COMMUNITY

## 2025-02-11 NOTE — PROGRESS NOTES
"Name: Lane Ceron      : 1945      MRN: [id number]  Encounter Provider: Giovanny Cook MD  Encounter Date: 2025   Encounter department: [facility]  :  Assessment & Plan  Memory impairment    CONCERNED ABOUT MEMORY ISSUES  SOME WORD FINDING ISSUES  OCC CANNOT REMEMBER APPOINTMENTS  NO DISORIENTATION    DISCUSSED AT LENGTH ISSUES               Depression Screening and Follow-up Plan: Patient was screened for depression during today's encounter. They screened negative with a PHQ-2 score of 0.        History of Present Illness   MEMORY CONCERNS      Review of Systems   Constitutional:  Negative for chills, fatigue and fever.   HENT:  Negative for sore throat.    Eyes:  Negative for discharge.   Respiratory:  Negative for cough and chest tightness.    Cardiovascular:  Negative for chest pain and palpitations.   Gastrointestinal:  Negative for abdominal pain, diarrhea, nausea and vomiting.   Musculoskeletal:  Negative for arthralgias and gait problem.   Neurological:  Negative for dizziness, weakness and headaches.   Hematological:  Negative for adenopathy.   Psychiatric/Behavioral:  The patient is not nervous/anxious.        Objective   /78 (BP Location: Left arm, Patient Position: Sitting, Cuff Size: Large)   Pulse 64   Temp (!) 95.8 °F (35.4 °C) (Temporal)   Resp 18   Ht 6' 2\" (1.88 m)   Wt 88.5 kg (195 lb)   BMI 25.04 kg/m²      Physical Exam    NO PE WAS PERFORMED      Administrative Statements   I have spent a total time of 15 minutes in caring for this patient on the day of the visit/encounter including Impressions.   " within normal limits

## 2025-02-17 DIAGNOSIS — R35.0 URINARY FREQUENCY: ICD-10-CM

## 2025-02-17 DIAGNOSIS — G47.9 DISTURBANCE OF SLEEP: ICD-10-CM

## 2025-02-18 RX ORDER — FINASTERIDE 5 MG/1
5 TABLET, FILM COATED ORAL DAILY
Qty: 90 TABLET | Refills: 1 | Status: SHIPPED | OUTPATIENT
Start: 2025-02-18

## 2025-02-20 RX ORDER — ALPRAZOLAM 0.5 MG
0.5 TABLET ORAL
Qty: 30 TABLET | Refills: 0 | Status: SHIPPED | OUTPATIENT
Start: 2025-02-20

## 2025-02-20 RX ORDER — ZOLPIDEM TARTRATE 10 MG/1
10 TABLET ORAL
Qty: 30 TABLET | Refills: 0 | Status: SHIPPED | OUTPATIENT
Start: 2025-02-20

## 2025-03-04 DIAGNOSIS — R35.0 URINARY FREQUENCY: ICD-10-CM

## 2025-03-05 RX ORDER — MIRABEGRON 50 MG/1
TABLET, FILM COATED, EXTENDED RELEASE ORAL
Qty: 30 TABLET | Refills: 0 | Status: SHIPPED | OUTPATIENT
Start: 2025-03-05

## 2025-03-07 DIAGNOSIS — M15.9 GENERALIZED OSTEOARTHRITIS OF MULTIPLE SITES: ICD-10-CM

## 2025-03-14 DIAGNOSIS — G47.9 DISTURBANCE OF SLEEP: ICD-10-CM

## 2025-03-15 RX ORDER — ZOLPIDEM TARTRATE 10 MG/1
10 TABLET ORAL
Qty: 30 TABLET | Refills: 0 | Status: SHIPPED | OUTPATIENT
Start: 2025-03-15

## 2025-03-15 RX ORDER — ALPRAZOLAM 0.5 MG
0.5 TABLET ORAL
Qty: 30 TABLET | Refills: 0 | Status: SHIPPED | OUTPATIENT
Start: 2025-03-15 | End: 2025-03-17 | Stop reason: SDUPTHER

## 2025-03-17 ENCOUNTER — OFFICE VISIT (OUTPATIENT)
Dept: FAMILY MEDICINE CLINIC | Facility: CLINIC | Age: 80
End: 2025-03-17
Payer: COMMERCIAL

## 2025-03-17 VITALS
HEIGHT: 74 IN | RESPIRATION RATE: 16 BRPM | WEIGHT: 194.6 LBS | HEART RATE: 59 BPM | OXYGEN SATURATION: 99 % | SYSTOLIC BLOOD PRESSURE: 130 MMHG | BODY MASS INDEX: 24.97 KG/M2 | DIASTOLIC BLOOD PRESSURE: 70 MMHG | TEMPERATURE: 97.4 F

## 2025-03-17 DIAGNOSIS — G89.29 CHRONIC MIDLINE LOW BACK PAIN WITH LEFT-SIDED SCIATICA: ICD-10-CM

## 2025-03-17 DIAGNOSIS — G47.9 DISTURBANCE OF SLEEP: ICD-10-CM

## 2025-03-17 DIAGNOSIS — M15.9 GENERALIZED OSTEOARTHRITIS OF MULTIPLE SITES: ICD-10-CM

## 2025-03-17 DIAGNOSIS — I10 PRIMARY HYPERTENSION: Primary | ICD-10-CM

## 2025-03-17 DIAGNOSIS — E78.2 MIXED HYPERLIPIDEMIA: ICD-10-CM

## 2025-03-17 DIAGNOSIS — J45.20 MILD INTERMITTENT ASTHMA WITHOUT COMPLICATION: ICD-10-CM

## 2025-03-17 DIAGNOSIS — M54.42 CHRONIC MIDLINE LOW BACK PAIN WITH LEFT-SIDED SCIATICA: ICD-10-CM

## 2025-03-17 PROCEDURE — 99214 OFFICE O/P EST MOD 30 MIN: CPT | Performed by: FAMILY MEDICINE

## 2025-03-17 PROCEDURE — G2211 COMPLEX E/M VISIT ADD ON: HCPCS | Performed by: FAMILY MEDICINE

## 2025-03-17 RX ORDER — ALPRAZOLAM 1 MG/1
1 TABLET ORAL
Start: 2025-03-17

## 2025-03-17 NOTE — ASSESSMENT & PLAN NOTE
Orders:  •  ALPRAZolam (XANAX) 1 mg tablet; Take 1 tablet (1 mg total) by mouth daily at bedtime as needed for anxiety

## 2025-03-17 NOTE — PROGRESS NOTES
Name: Lane Ceron      : 1945      MRN: [id number]  Encounter Provider: Giovanny Cook MD  Encounter Date: 3/17/2025   Encounter department: [facility]    Assessment & Plan  Primary hypertension  STABLE  DENIES ANY CP, SOB, PALPITATIONS, OR HEADACHE  NOTES NO WATER RETENTION  COMPLIANT WITH MEDICATION  NO CONCERNS    - CONTINUE CURRENT TREATMENT PLAN  - MONITOR DIETARY SODIUM INTAKE  - ENCOURAGE PHYSICAL ACTIVITY  - RV 3 MONTHS         Mixed hyperlipidemia  WATCHING CHOLESTEROL INTAKE  COMPLIANT WITH MEDICATION  NO CONCERNS    - CONTINUE TO MONITOR DIETARY CHOL INTAKE  - CONTINUE CURRENT MEDICATION  - ENCOURAGED PHYSICAL ACTIVITY         Generalized osteoarthritis of multiple sites  STABLE  DENIES ANY JOINT SWELLING OR REDNESS  JOINT STIFFNESS PRESENT  PAIN MANAGEMENT ADEQUATE    - CONTINUE CURRENT MANAGEMENT  - MEDICATION AS DIRECTED  - CALL / RETURN IF SYMPTOMS WORSEN         Mild intermittent asthma without complication  STABLE         Chronic midline low back pain with left-sided sciatica         Disturbance of sleep    Orders:  •  ALPRAZolam (XANAX) 1 mg tablet; Take 1 tablet (1 mg total) by mouth daily at bedtime as needed for anxiety         History of Present Illness     PATIENT RETURNS FOR ROUTINE EVALUATION OF PATIENT'S MEDICAL ISSUES    INDIVIDUAL MEDICAL ISSUES WITH THEIR CURRENT STATUS, ASSESSMENT AND PLANS ARE LISTED ABOVE            Review of Systems   Constitutional:  Negative for chills, fatigue and fever.   HENT:  Negative for congestion, ear discharge, ear pain, mouth sores, postnasal drip, sore throat and trouble swallowing.    Eyes:  Negative for pain, discharge and visual disturbance.   Respiratory:  Negative for cough, shortness of breath and wheezing.    Cardiovascular:  Negative for chest pain, palpitations and leg swelling.   Gastrointestinal:  Negative for abdominal distention, abdominal pain, blood in stool, diarrhea and nausea.   Endocrine:  Negative for polydipsia, polyphagia and polyuria.   Genitourinary:  Negative for dysuria, frequency, hematuria and urgency.   Musculoskeletal:  Positive for arthralgias. Negative for gait problem and joint swelling.   Skin:  Negative for pallor and rash.   Neurological:  Negative for dizziness, syncope, speech difficulty, weakness, light-headedness, numbness and headaches.   Hematological:  Negative for adenopathy.   Psychiatric/Behavioral:  Positive for sleep disturbance. Negative for behavioral problems and confusion. The patient is not nervous/anxious.      Past Medical History:   Diagnosis Date   • Arthritis 2010   • Asthma 2010   • Cataract     resolved: 7/20/2016   • Glaucoma    • Sciatica, right side     last assessed: 7/20/2016     Past Surgical History:   Procedure Laterality Date   • EPIDURAL BLOCK INJECTION N/A 5/5/2023    Procedure: BLOCK / INJECTION EPIDURAL STEROID LUMBAR L4-L5 LESI;  Surgeon: Osman Erickson MD;  Location: [facility] MAIN OR;  Service: Pain Management    • EPIDURAL BLOCK INJECTION N/A 8/30/2023    Procedure: L5-S1 LUMBAR epidural steroid injection (90166);  Surgeon: Blane Wagner DO;  Location: [facility] MAIN OR;  Service: Pain Management    • EPIDURAL BLOCK INJECTION N/A 9/4/2024    Procedure: L5-S1 LUMBAR EPIDURAL STEROID INJECTION;  Surgeon: Blane Wagner DO;  Location: [facility] MAIN OR;  Service: Pain Management    • LUMBAR EPIDURAL INJECTION N/A 8/11/2022    Procedure: L5 S1 LUMBAR epidural steroid injection ([id number]);  Surgeon: Osman Erickson MD;  Location: [facility] MAIN OR;  Service: Pain Management    • LUMBAR EPIDURAL INJECTION N/A 9/30/2022    Procedure: L5 S1 LUMBAR epidural steroid injection ( [id number]);  Surgeon: Osman Erickson MD;  Location: [facility] MAIN OR;  Service: Pain Management    • LUMBAR EPIDURAL INJECTION N/A 1/4/2023    Procedure: L5 S1 LUMBAR epidural steroid injection ([id number]);  Surgeon: Blane Wagner DO;  Location: [facility] MAIN OR;  Service: Pain Management     • TONSILLECTOMY       Family History   Problem Relation Age of Onset   • Coronary artery disease Mother    • Cancer Mother    • Coronary artery disease Father    • Substance Abuse Daughter    • Mental illness Neg Hx      Social History     Tobacco Use   • Smoking status: Former     Current packs/day: 0.00     Average packs/day: 2.0 packs/day for 20.0 years (40.0 ttl pk-yrs)     Types: Cigarettes     Start date: 1966     Quit date: 1980     Years since quittin.2     Passive exposure: Past   • Smokeless tobacco: Never   Vaping Use   • Vaping status: Never Used   Substance and Sexual Activity   • Alcohol use: Not Currently   • Drug use: Never   • Sexual activity: Not Currently     Partners: Female     Current Outpatient Medications on File Prior to Visit   Medication Sig   • aspirin (ECOTRIN LOW STRENGTH) 81 mg EC tablet Take 81 mg by mouth daily   • diphenhydrAMINE (BENADRYL) 50 mg capsule Take 50 mg by mouth daily at bedtime as needed for itching   • finasteride (PROSCAR) 5 mg tablet Take 1 tablet (5 mg total) by mouth daily   • magnesium 30 MG tablet Take 30 mg by mouth daily at bedtime as needed   • mometasone (Asmanex, 120 Metered Doses,) 220 mcg/actuation inhaler Inhale 1 puff every evening Rinse mouth after use.   • multivitamin (THERAGRAN) TABS Take 1 tablet by mouth daily   • nabumetone (RELAFEN) 750 mg tablet Take 1 tablet (750 mg total) by mouth 2 (two) times a day   • oxybutynin (DITROPAN-XL) 10 MG 24 hr tablet Take 1 tablet (10 mg total) by mouth daily   • Probiotic Product (PROBIOTIC PO) Take by mouth   • rosuvastatin (CRESTOR) 20 MG tablet Take 1 tablet (20 mg total) by mouth daily   • traZODone (DESYREL) 100 mg tablet Take 2 tablets (200 mg total) by mouth daily at bedtime   • triamcinolone (KENALOG) 0.1 % ointment APPLY TOPICALLY 2 (TWO) TIMES A DAY   • zolpidem (AMBIEN) 10 mg tablet Take 1 tablet (10 mg total) by mouth daily at bedtime as needed for sleep   • [DISCONTINUED] ALPRAZolam  "(XANAX) 0.5 mg tablet Take 1 tablet (0.5 mg total) by mouth daily at bedtime as needed for anxiety   • [DISCONTINUED] Myrbetriq 50 MG TB24 TAKE 1 TABLET (50 MG TOTAL) BY MOUTH IN THE MORNING     Allergies   Allergen Reactions   • Pollen Extract Nasal Congestion     Immunization History   Administered Date(s) Administered   • COVID-19 MODERNA VACC 0.5 ML IM 02/19/2021, 03/18/2021, 10/26/2021   • COVID-19 Pfizer mRNA vacc PF harvinder-sucrose 12 yr and older (Comirnaty) 09/25/2024   • DT (pediatric) 06/01/1999   • INFLUENZA 11/01/2019   • Influenza Split High Dose Preservative Free IM 10/01/2014, 08/25/2016, 09/01/2017, 10/30/2019, 09/25/2024   • Influenza, high dose seasonal 0.7 mL 09/07/2018, 09/10/2020, 09/01/2021, 09/29/2022   • Influenza, seasonal, injectable 1945, 10/13/2010, 09/12/2013   • Pneumococcal Conjugate 13-Valent 08/25/2016   • Td (adult), adsorbed 05/20/2010   • Tdap 06/07/2022     Objective   /70   Pulse 59   Temp (!) 97.4 °F (36.3 °C)   Resp 16   Ht 6' 2\" (1.88 m)   Wt 88.3 kg (194 lb 9.6 oz)   SpO2 99%   BMI 24.99 kg/m²     Physical Exam  Vitals reviewed.   Constitutional:       General: He is not in acute distress.     Appearance: Normal appearance. He is well-developed and normal weight. He is not ill-appearing.   HENT:      Head: Normocephalic and atraumatic.      Nose: Nose normal.      Mouth/Throat:      Mouth: Mucous membranes are moist.   Eyes:      General:         Right eye: No discharge.         Left eye: No discharge.      Conjunctiva/sclera: Conjunctivae normal.      Pupils: Pupils are equal, round, and reactive to light.   Neck:      Thyroid: No thyromegaly.      Vascular: No JVD.   Cardiovascular:      Rate and Rhythm: Normal rate and regular rhythm.      Heart sounds: Normal heart sounds. No murmur heard.  Pulmonary:      Effort: Pulmonary effort is normal.      Breath sounds: Normal breath sounds. No wheezing or rales.   Abdominal:      General: Bowel sounds are " normal.      Palpations: Abdomen is soft. There is no mass.      Tenderness: There is no abdominal tenderness. There is no guarding or rebound.   Musculoskeletal:         General: No tenderness or deformity.      Cervical back: Neck supple.      Comments: MODERATE DJD CHANGES   Lymphadenopathy:      Cervical: No cervical adenopathy.   Skin:     General: Skin is warm and dry.      Findings: No erythema or rash.   Neurological:      General: No focal deficit present.      Mental Status: He is alert and oriented to person, place, and time.   Psychiatric:         Mood and Affect: Mood normal.         Behavior: Behavior normal.         Thought Content: Thought content normal.         Judgment: Judgment normal.

## 2025-03-31 DIAGNOSIS — G47.9 DISTURBANCE OF SLEEP: ICD-10-CM

## 2025-03-31 RX ORDER — ALPRAZOLAM 1 MG/1
1 TABLET ORAL
Qty: 30 TABLET | Refills: 0 | Status: SHIPPED | OUTPATIENT
Start: 2025-03-31

## 2025-03-31 NOTE — TELEPHONE ENCOUNTER
You increased Cristhian Alprazolam and sent the prescription however, it was [facility] to [facility]    Please resend.

## 2025-04-07 DIAGNOSIS — G47.9 DISTURBANCE OF SLEEP: ICD-10-CM

## 2025-04-07 DIAGNOSIS — M15.9 GENERALIZED OSTEOARTHRITIS OF MULTIPLE SITES: ICD-10-CM

## 2025-04-07 DIAGNOSIS — R93.1 ELEVATED CORONARY ARTERY CALCIUM SCORE: ICD-10-CM

## 2025-04-07 DIAGNOSIS — I25.10 ATHEROSCLEROSIS OF CORONARY ARTERY OF NATIVE HEART WITHOUT ANGINA PECTORIS, UNSPECIFIED VESSEL OR LESION TYPE: ICD-10-CM

## 2025-04-07 DIAGNOSIS — E78.2 MIXED HYPERLIPIDEMIA: ICD-10-CM

## 2025-04-08 RX ORDER — ROSUVASTATIN CALCIUM 20 MG/1
20 TABLET, COATED ORAL DAILY
Qty: 90 TABLET | Refills: 0 | Status: CANCELLED | OUTPATIENT
Start: 2025-04-08

## 2025-04-08 RX ORDER — TRAZODONE HYDROCHLORIDE 100 MG/1
200 TABLET ORAL
Qty: 180 TABLET | Refills: 0 | Status: SHIPPED | OUTPATIENT
Start: 2025-04-08

## 2025-04-09 DIAGNOSIS — I25.10 ATHEROSCLEROSIS OF CORONARY ARTERY OF NATIVE HEART WITHOUT ANGINA PECTORIS, UNSPECIFIED VESSEL OR LESION TYPE: ICD-10-CM

## 2025-04-09 DIAGNOSIS — M15.9 GENERALIZED OSTEOARTHRITIS OF MULTIPLE SITES: ICD-10-CM

## 2025-04-09 DIAGNOSIS — R93.1 ELEVATED CORONARY ARTERY CALCIUM SCORE: ICD-10-CM

## 2025-04-09 DIAGNOSIS — E78.2 MIXED HYPERLIPIDEMIA: ICD-10-CM

## 2025-04-09 RX ORDER — ROSUVASTATIN CALCIUM 20 MG/1
20 TABLET, COATED ORAL DAILY
Qty: 90 TABLET | Refills: 1 | Status: SHIPPED | OUTPATIENT
Start: 2025-04-09

## 2025-04-17 DIAGNOSIS — G47.9 DISTURBANCE OF SLEEP: ICD-10-CM

## 2025-04-17 DIAGNOSIS — M15.9 GENERALIZED OSTEOARTHRITIS OF MULTIPLE SITES: ICD-10-CM

## 2025-04-17 NOTE — TELEPHONE ENCOUNTER
This looks like a duplicate.  Cristhian came in and I informed him that this rx was sent to the pharmacy on 4/9    Please refuse

## 2025-04-17 NOTE — TELEPHONE ENCOUNTER
Cristhian stopped in the office.  He stated that he knows that he is a few days earlier, but he is out of his zolpidem.  He states he only takes 1.

## 2025-04-18 RX ORDER — ZOLPIDEM TARTRATE 10 MG/1
10 TABLET ORAL
Qty: 30 TABLET | OUTPATIENT
Start: 2025-04-18

## 2025-04-18 RX ORDER — ZOLPIDEM TARTRATE 10 MG/1
10 TABLET ORAL
Qty: 30 TABLET | Refills: 0 | Status: SHIPPED | OUTPATIENT
Start: 2025-04-18

## 2025-04-18 NOTE — TELEPHONE ENCOUNTER
Requested medication(s) are due for refill today: No  Patient has already received a courtesy refill: Yes  Other reason request has been forwarded to provider: Duplicate request - The original prescription was reordered on 4/18/2025 by Giovanny Cook MD. Renewing this prescription may not be appropriate.

## 2025-05-01 DIAGNOSIS — M15.9 GENERALIZED OSTEOARTHRITIS OF MULTIPLE SITES: ICD-10-CM

## 2025-05-01 DIAGNOSIS — G47.9 DISTURBANCE OF SLEEP: ICD-10-CM

## 2025-05-02 RX ORDER — ZOLPIDEM TARTRATE 10 MG/1
10 TABLET ORAL
Qty: 30 TABLET | Refills: 0 | Status: SHIPPED | OUTPATIENT
Start: 2025-05-02

## 2025-05-02 RX ORDER — ALPRAZOLAM 1 MG/1
1 TABLET ORAL
Qty: 30 TABLET | Refills: 0 | Status: SHIPPED | OUTPATIENT
Start: 2025-05-02

## 2025-05-02 NOTE — TELEPHONE ENCOUNTER
Requested medication(s) are due for refill today: No  Patient has already received a courtesy refill: No  Other reason request has been forwarded to provider: PT ONLY DUE FOR XANAX REFILL

## 2025-05-14 DIAGNOSIS — G47.9 DISTURBANCE OF SLEEP: ICD-10-CM

## 2025-05-15 RX ORDER — ZOLPIDEM TARTRATE 10 MG/1
10 TABLET ORAL
Qty: 30 TABLET | Refills: 0 | Status: SHIPPED | OUTPATIENT
Start: 2025-05-15

## 2025-05-16 DIAGNOSIS — M15.9 GENERALIZED OSTEOARTHRITIS OF MULTIPLE SITES: ICD-10-CM

## 2025-05-25 DIAGNOSIS — G47.9 DISTURBANCE OF SLEEP: ICD-10-CM

## 2025-05-25 DIAGNOSIS — R35.0 URINARY FREQUENCY: ICD-10-CM

## 2025-05-26 RX ORDER — FINASTERIDE 5 MG/1
5 TABLET, FILM COATED ORAL DAILY
Qty: 90 TABLET | Refills: 1 | Status: SHIPPED | OUTPATIENT
Start: 2025-05-26

## 2025-05-27 RX ORDER — ALPRAZOLAM 1 MG/1
1 TABLET ORAL
Qty: 30 TABLET | Refills: 0 | Status: SHIPPED | OUTPATIENT
Start: 2025-05-27

## 2025-05-27 NOTE — TELEPHONE ENCOUNTER
Requested medication(s) are due for refill today: No - Not due until 5/30/25  Patient has already received a courtesy refill: No  Other reason request has been forwarded to provider: This refill cannot be delegated

## 2025-06-09 DIAGNOSIS — G47.9 DISTURBANCE OF SLEEP: ICD-10-CM

## 2025-06-10 RX ORDER — ZOLPIDEM TARTRATE 10 MG/1
10 TABLET ORAL
Qty: 30 TABLET | Refills: 0 | Status: SHIPPED | OUTPATIENT
Start: 2025-06-10

## 2025-06-11 DIAGNOSIS — G47.9 DISTURBANCE OF SLEEP: ICD-10-CM

## 2025-06-11 DIAGNOSIS — M15.9 GENERALIZED OSTEOARTHRITIS OF MULTIPLE SITES: ICD-10-CM

## 2025-06-12 RX ORDER — ALPRAZOLAM 1 MG/1
1 TABLET ORAL
Qty: 30 TABLET | Refills: 0 | Status: SHIPPED | OUTPATIENT
Start: 2025-06-12

## 2025-06-12 NOTE — TELEPHONE ENCOUNTER
Requested medication(s) are due for refill today: Yes  Patient has already received a courtesy refill: No  Other reason request has been forwarded to provider: Please review to see if the refill is appropriate.

## 2025-07-06 DIAGNOSIS — G47.9 DISTURBANCE OF SLEEP: ICD-10-CM

## 2025-07-07 DIAGNOSIS — E78.2 MIXED HYPERLIPIDEMIA: ICD-10-CM

## 2025-07-07 DIAGNOSIS — I25.10 ATHEROSCLEROSIS OF CORONARY ARTERY OF NATIVE HEART WITHOUT ANGINA PECTORIS, UNSPECIFIED VESSEL OR LESION TYPE: ICD-10-CM

## 2025-07-07 DIAGNOSIS — R93.1 ELEVATED CORONARY ARTERY CALCIUM SCORE: ICD-10-CM

## 2025-07-07 RX ORDER — ZOLPIDEM TARTRATE 10 MG/1
10 TABLET ORAL
Qty: 30 TABLET | Refills: 0 | Status: SHIPPED | OUTPATIENT
Start: 2025-07-07

## 2025-07-07 NOTE — TELEPHONE ENCOUNTER
Requested medication(s) are due for refill today: Yes  Patient has already received a courtesy refill: No  Other reason request has been forwarded to provider:     Psychiatry:  Anxiolytics/[facility]not[facility] [id number]/2025 04:38 PM   Protocol Details This refill cannot be delegated

## 2025-07-09 RX ORDER — ROSUVASTATIN CALCIUM 20 MG/1
20 TABLET, COATED ORAL DAILY
Qty: 30 TABLET | Refills: 0 | Status: SHIPPED | OUTPATIENT
Start: 2025-07-09

## 2025-07-09 NOTE — TELEPHONE ENCOUNTER
Patient needs updated blood work and has previously placed orders. Please contact patient to go for labs. Courtesy refill provided.     What Type Of Note Output Would You Prefer (Optional)?: Bullet Format Hpi Title: Evaluation of Skin Lesions

## 2025-07-13 DIAGNOSIS — G47.9 DISTURBANCE OF SLEEP: ICD-10-CM

## 2025-07-13 DIAGNOSIS — M15.9 GENERALIZED OSTEOARTHRITIS OF MULTIPLE SITES: ICD-10-CM

## 2025-07-14 RX ORDER — TRAZODONE HYDROCHLORIDE 100 MG/1
200 TABLET ORAL
Qty: 180 TABLET | Refills: 1 | Status: SHIPPED | OUTPATIENT
Start: 2025-07-14

## 2025-07-14 RX ORDER — ALPRAZOLAM 1 MG/1
1 TABLET ORAL
Qty: 30 TABLET | Refills: 0 | Status: SHIPPED | OUTPATIENT
Start: 2025-07-14

## 2025-07-25 DIAGNOSIS — R21 RASH: ICD-10-CM

## 2025-07-25 DIAGNOSIS — J45.20 MILD INTERMITTENT ASTHMA WITHOUT COMPLICATION: ICD-10-CM

## 2025-07-29 RX ORDER — TRIAMCINOLONE ACETONIDE 1 MG/G
OINTMENT TOPICAL 2 TIMES DAILY
Qty: 80 G | Refills: 0 | Status: SHIPPED | OUTPATIENT
Start: 2025-07-29

## 2025-07-29 RX ORDER — MOMETASONE FUROATE 220 UG/1
1 INHALANT RESPIRATORY (INHALATION) EVERY EVENING
Qty: 1 EACH | Refills: 0 | Status: SHIPPED | OUTPATIENT
Start: 2025-07-29

## 2025-08-01 DIAGNOSIS — G47.9 DISTURBANCE OF SLEEP: ICD-10-CM

## 2025-08-01 RX ORDER — ALPRAZOLAM 1 MG/1
1 TABLET ORAL
Qty: 30 TABLET | Refills: 0 | OUTPATIENT
Start: 2025-08-01

## 2025-08-07 ENCOUNTER — TELEPHONE (OUTPATIENT)
Age: 80
End: 2025-08-07

## (undated) DEVICE — PLASTIC ADHESIVE BANDAGE: Brand: CURITY

## (undated) DEVICE — SMALL NEEDLE COUNTER NEST

## (undated) DEVICE — RADIOLOGY STERILE LABELS: Brand: CENTURION

## (undated) DEVICE — TOWEL SET X-RAY

## (undated) DEVICE — SYRINGE 5ML LL

## (undated) DEVICE — GLOVE SRG BIOGEL 7.5

## (undated) DEVICE — BRACHIAL PLEXUS SET

## (undated) DEVICE — CHLORAPREP APPLICATOR TINTED 10.5ML ONE-STEP

## (undated) DEVICE — TRAY PAIN SUPPORT

## (undated) DEVICE — TRAY EPIDURAL PERIFIX 20GA X 3.5IN TUOHY 8ML

## (undated) DEVICE — IV SET EXT SM BORE CARESITE 8IN

## (undated) DEVICE — GLOVE SRG LF STRL BGL SKNSNS 7.5 PF

## (undated) DEVICE — SYRINGE EPI 8ML LUER SLIP LOSS OF RESISTANCE PLASTIC PERFIX

## (undated) DEVICE — NEEDLE BLUNT 18 G X 1 1/2 W FILTER

## (undated) DEVICE — FLEXIBLE ADHESIVE BANDAGE,X-LARGE: Brand: CURITY

## (undated) DEVICE — NEEDLE SPINAL 22G X 3.5IN  QUINCKE